# Patient Record
Sex: MALE | Race: WHITE | Employment: OTHER | URBAN - METROPOLITAN AREA
[De-identification: names, ages, dates, MRNs, and addresses within clinical notes are randomized per-mention and may not be internally consistent; named-entity substitution may affect disease eponyms.]

---

## 2018-06-06 ENCOUNTER — HOSPITAL ENCOUNTER (OUTPATIENT)
Age: 78
Discharge: HOME | End: 2018-06-06
Payer: MEDICARE

## 2018-06-06 DIAGNOSIS — E11.9 DIABETES MELLITUS WITHOUT COMPLICATION (HCC): ICD-10-CM

## 2018-06-06 DIAGNOSIS — E78.5 LIPIDS BLOOD INCREASED: ICD-10-CM

## 2018-06-06 DIAGNOSIS — E03.9 ACQUIRED HYPOTHYROIDISM: ICD-10-CM

## 2018-06-06 LAB
ALBUMIN SERPL-MCNC: 4 G/DL (ref 3.5–5.2)
ALP BLD-CCNC: 54 U/L (ref 40–129)
ALT SERPL-CCNC: 10 U/L (ref 0–40)
ANION GAP SERPL CALCULATED.3IONS-SCNC: 12 MMOL/L (ref 7–16)
AST SERPL-CCNC: 17 U/L (ref 0–39)
BILIRUB SERPL-MCNC: 0.3 MG/DL (ref 0–1.2)
BUN BLDV-MCNC: 23 MG/DL (ref 8–23)
CALCIUM SERPL-MCNC: 9.2 MG/DL (ref 8.6–10.2)
CHLORIDE BLD-SCNC: 97 MMOL/L (ref 98–107)
CHOLESTEROL, TOTAL: 158 MG/DL (ref 0–199)
CO2: 28 MMOL/L (ref 22–29)
CREAT SERPL-MCNC: 1.1 MG/DL (ref 0.7–1.2)
GFR AFRICAN AMERICAN: >60
GFR NON-AFRICAN AMERICAN: >60 ML/MIN/1.73
GLUCOSE BLD-MCNC: 107 MG/DL (ref 74–109)
HBA1C MFR BLD: 6 % (ref 4.8–5.9)
HDLC SERPL-MCNC: 45 MG/DL
LDL CHOLESTEROL CALCULATED: 77 MG/DL (ref 0–99)
POTASSIUM SERPL-SCNC: 4.6 MMOL/L (ref 3.5–5)
SODIUM BLD-SCNC: 137 MMOL/L (ref 132–146)
TOTAL PROTEIN: 6.7 G/DL (ref 6.4–8.3)
TRIGL SERPL-MCNC: 181 MG/DL (ref 0–149)
TSH SERPL DL<=0.05 MIU/L-ACNC: 2.83 UIU/ML (ref 0.27–4.2)
VLDLC SERPL CALC-MCNC: 36 MG/DL

## 2018-06-06 PROCEDURE — 84443 ASSAY THYROID STIM HORMONE: CPT

## 2018-06-06 PROCEDURE — 36415 COLL VENOUS BLD VENIPUNCTURE: CPT

## 2018-06-06 PROCEDURE — 80053 COMPREHEN METABOLIC PANEL: CPT

## 2018-06-06 PROCEDURE — 80061 LIPID PANEL: CPT

## 2018-06-06 PROCEDURE — 83036 HEMOGLOBIN GLYCOSYLATED A1C: CPT

## 2018-06-13 PROBLEM — C61 PROSTATE CANCER (HCC): Status: ACTIVE | Noted: 2018-06-13

## 2018-09-07 ENCOUNTER — HOSPITAL ENCOUNTER (OUTPATIENT)
Age: 78
Discharge: HOME | End: 2018-09-07
Payer: MEDICARE

## 2018-09-07 PROCEDURE — 36415 COLL VENOUS BLD VENIPUNCTURE: CPT

## 2018-09-07 PROCEDURE — G0103 PSA SCREENING: HCPCS

## 2018-09-07 PROCEDURE — 84153 ASSAY OF PSA TOTAL: CPT

## 2018-09-10 LAB
PROSTATE SPECIFIC ANTIGEN: 0.23 NG/ML (ref 0–4)
PROSTATE SPECIFIC ANTIGEN: ABNORMAL NG/ML (ref 0–4)

## 2018-12-04 ENCOUNTER — HOSPITAL ENCOUNTER (OUTPATIENT)
Age: 78
Discharge: HOME | End: 2018-12-04
Payer: MEDICARE

## 2018-12-04 DIAGNOSIS — E11.9 DIABETES MELLITUS WITHOUT COMPLICATION (HCC): ICD-10-CM

## 2018-12-04 DIAGNOSIS — E78.5 LIPIDS BLOOD INCREASED: ICD-10-CM

## 2018-12-04 DIAGNOSIS — E03.9 ACQUIRED HYPOTHYROIDISM: ICD-10-CM

## 2018-12-04 LAB
ALBUMIN SERPL-MCNC: 4.2 G/DL (ref 3.5–5.2)
ALP BLD-CCNC: 57 U/L (ref 40–129)
ALT SERPL-CCNC: 9 U/L (ref 0–40)
ANION GAP SERPL CALCULATED.3IONS-SCNC: 12 MMOL/L (ref 7–16)
AST SERPL-CCNC: 16 U/L (ref 0–39)
BILIRUB SERPL-MCNC: 0.4 MG/DL (ref 0–1.2)
BUN BLDV-MCNC: 26 MG/DL (ref 8–23)
CALCIUM SERPL-MCNC: 9.2 MG/DL (ref 8.6–10.2)
CHLORIDE BLD-SCNC: 96 MMOL/L (ref 98–107)
CHOLESTEROL, TOTAL: 166 MG/DL (ref 0–199)
CO2: 28 MMOL/L (ref 22–29)
CREAT SERPL-MCNC: 1.3 MG/DL (ref 0.7–1.2)
GFR AFRICAN AMERICAN: >60
GFR NON-AFRICAN AMERICAN: 53 ML/MIN/1.73
GLUCOSE BLD-MCNC: 107 MG/DL (ref 74–99)
HBA1C MFR BLD: 5.8 % (ref 4–5.6)
HDLC SERPL-MCNC: 50 MG/DL
LDL CHOLESTEROL CALCULATED: 84 MG/DL (ref 0–99)
POTASSIUM SERPL-SCNC: 4.6 MMOL/L (ref 3.5–5)
PROSTATE SPECIFIC ANTIGEN: 0.57 NG/ML (ref 0–4)
SODIUM BLD-SCNC: 136 MMOL/L (ref 132–146)
TOTAL PROTEIN: 7 G/DL (ref 6.4–8.3)
TRIGL SERPL-MCNC: 159 MG/DL (ref 0–149)
TSH SERPL DL<=0.05 MIU/L-ACNC: 3.55 UIU/ML (ref 0.27–4.2)
VLDLC SERPL CALC-MCNC: 32 MG/DL

## 2018-12-04 PROCEDURE — 36415 COLL VENOUS BLD VENIPUNCTURE: CPT

## 2018-12-04 PROCEDURE — 80061 LIPID PANEL: CPT

## 2018-12-04 PROCEDURE — 84443 ASSAY THYROID STIM HORMONE: CPT

## 2018-12-04 PROCEDURE — 84153 ASSAY OF PSA TOTAL: CPT

## 2018-12-04 PROCEDURE — 83036 HEMOGLOBIN GLYCOSYLATED A1C: CPT

## 2018-12-04 PROCEDURE — 80053 COMPREHEN METABOLIC PANEL: CPT

## 2019-06-05 ENCOUNTER — HOSPITAL ENCOUNTER (OUTPATIENT)
Age: 79
Discharge: HOME | End: 2019-06-05
Payer: MEDICARE

## 2019-06-05 DIAGNOSIS — E11.9 TYPE 2 DIABETES MELLITUS WITHOUT COMPLICATION, WITHOUT LONG-TERM CURRENT USE OF INSULIN (HCC): ICD-10-CM

## 2019-06-05 DIAGNOSIS — E03.9 ACQUIRED HYPOTHYROIDISM: ICD-10-CM

## 2019-06-05 DIAGNOSIS — E78.5 HYPERLIPIDEMIA, UNSPECIFIED HYPERLIPIDEMIA TYPE: ICD-10-CM

## 2019-06-05 LAB
ALBUMIN SERPL-MCNC: 4.3 G/DL (ref 3.5–5.2)
ALP BLD-CCNC: 58 U/L (ref 40–129)
ALT SERPL-CCNC: 7 U/L (ref 0–40)
ANION GAP SERPL CALCULATED.3IONS-SCNC: 10 MMOL/L (ref 7–16)
AST SERPL-CCNC: 16 U/L (ref 0–39)
BILIRUB SERPL-MCNC: 0.4 MG/DL (ref 0–1.2)
BUN BLDV-MCNC: 20 MG/DL (ref 8–23)
CALCIUM SERPL-MCNC: 9.1 MG/DL (ref 8.6–10.2)
CHLORIDE BLD-SCNC: 98 MMOL/L (ref 98–107)
CHOLESTEROL, TOTAL: 152 MG/DL (ref 0–199)
CO2: 29 MMOL/L (ref 22–29)
CREAT SERPL-MCNC: 1.3 MG/DL (ref 0.7–1.2)
GFR AFRICAN AMERICAN: >60
GFR NON-AFRICAN AMERICAN: 53 ML/MIN/1.73
GLUCOSE BLD-MCNC: 108 MG/DL (ref 74–99)
HBA1C MFR BLD: 6 % (ref 4–5.6)
HDLC SERPL-MCNC: 45 MG/DL
LDL CHOLESTEROL CALCULATED: 80 MG/DL (ref 0–99)
POTASSIUM SERPL-SCNC: 4.8 MMOL/L (ref 3.5–5)
SODIUM BLD-SCNC: 137 MMOL/L (ref 132–146)
TOTAL PROTEIN: 6.9 G/DL (ref 6.4–8.3)
TRIGL SERPL-MCNC: 136 MG/DL (ref 0–149)
TSH SERPL DL<=0.05 MIU/L-ACNC: 3.8 UIU/ML (ref 0.27–4.2)
VLDLC SERPL CALC-MCNC: 27 MG/DL

## 2019-06-05 PROCEDURE — 84443 ASSAY THYROID STIM HORMONE: CPT

## 2019-06-05 PROCEDURE — 36415 COLL VENOUS BLD VENIPUNCTURE: CPT

## 2019-06-05 PROCEDURE — 80061 LIPID PANEL: CPT

## 2019-06-05 PROCEDURE — 80053 COMPREHEN METABOLIC PANEL: CPT

## 2019-06-05 PROCEDURE — 83036 HEMOGLOBIN GLYCOSYLATED A1C: CPT

## 2019-06-05 PROCEDURE — G0103 PSA SCREENING: HCPCS

## 2019-06-05 PROCEDURE — 84153 ASSAY OF PSA TOTAL: CPT

## 2019-06-20 LAB
PROSTATE SPECIFIC ANTIGEN: 4.12 NG/ML (ref 0–4)
PROSTATE SPECIFIC ANTIGEN: ABNORMAL NG/ML (ref 0–4)

## 2019-08-06 ENCOUNTER — HOSPITAL ENCOUNTER (OUTPATIENT)
Age: 79
Discharge: HOME | End: 2019-08-08
Payer: MEDICARE

## 2019-08-06 LAB — PROSTATE SPECIFIC ANTIGEN: 4.06 NG/ML (ref 0–4)

## 2019-08-06 PROCEDURE — 84153 ASSAY OF PSA TOTAL: CPT

## 2019-09-27 ENCOUNTER — HOSPITAL ENCOUNTER (OUTPATIENT)
Age: 79
Discharge: HOME | End: 2019-09-29
Payer: MEDICARE

## 2019-09-27 PROCEDURE — 84403 ASSAY OF TOTAL TESTOSTERONE: CPT

## 2019-09-27 PROCEDURE — 84153 ASSAY OF PSA TOTAL: CPT

## 2019-09-28 LAB
PROSTATE SPECIFIC ANTIGEN: 0.52 NG/ML (ref 0–4)
TESTOSTERONE TOTAL: <2.5 NG/DL

## 2020-01-03 ENCOUNTER — HOSPITAL ENCOUNTER (OUTPATIENT)
Age: 80
Discharge: HOME | End: 2020-01-05
Payer: MEDICARE

## 2020-01-03 LAB — PROSTATE SPECIFIC ANTIGEN: 2.34 NG/ML (ref 0–4)

## 2020-01-03 PROCEDURE — 84153 ASSAY OF PSA TOTAL: CPT

## 2020-03-13 ENCOUNTER — HOSPITAL ENCOUNTER (OUTPATIENT)
Age: 80
Discharge: HOME | End: 2020-03-15
Payer: MEDICARE

## 2020-03-13 LAB
ALBUMIN SERPL-MCNC: 4 G/DL (ref 3.5–5.2)
ALP BLD-CCNC: 57 U/L (ref 40–129)
ALT SERPL-CCNC: 7 U/L (ref 0–40)
ANION GAP SERPL CALCULATED.3IONS-SCNC: 11 MMOL/L (ref 7–16)
AST SERPL-CCNC: 20 U/L (ref 0–39)
BILIRUB SERPL-MCNC: 0.3 MG/DL (ref 0–1.2)
BUN BLDV-MCNC: 26 MG/DL (ref 8–23)
CALCIUM SERPL-MCNC: 9.3 MG/DL (ref 8.6–10.2)
CHLORIDE BLD-SCNC: 100 MMOL/L (ref 98–107)
CHOLESTEROL, TOTAL: 151 MG/DL (ref 0–199)
CO2: 27 MMOL/L (ref 22–29)
CREAT SERPL-MCNC: 1.4 MG/DL (ref 0.7–1.2)
GFR AFRICAN AMERICAN: 59
GFR NON-AFRICAN AMERICAN: 49 ML/MIN/1.73
GLUCOSE BLD-MCNC: 114 MG/DL (ref 74–99)
HBA1C MFR BLD: 6.1 % (ref 4–5.6)
HDLC SERPL-MCNC: 43 MG/DL
LDL CHOLESTEROL CALCULATED: 85 MG/DL (ref 0–99)
POTASSIUM SERPL-SCNC: 6.1 MMOL/L (ref 3.5–5)
SODIUM BLD-SCNC: 138 MMOL/L (ref 132–146)
T4 FREE: 1.38 NG/DL (ref 0.93–1.7)
TOTAL PROTEIN: 6.9 G/DL (ref 6.4–8.3)
TRIGL SERPL-MCNC: 114 MG/DL (ref 0–149)
TSH SERPL DL<=0.05 MIU/L-ACNC: 3.75 UIU/ML (ref 0.27–4.2)
VLDLC SERPL CALC-MCNC: 23 MG/DL

## 2020-03-13 PROCEDURE — 80061 LIPID PANEL: CPT

## 2020-03-13 PROCEDURE — 80053 COMPREHEN METABOLIC PANEL: CPT

## 2020-03-13 PROCEDURE — 84439 ASSAY OF FREE THYROXINE: CPT

## 2020-03-13 PROCEDURE — 84443 ASSAY THYROID STIM HORMONE: CPT

## 2020-03-13 PROCEDURE — 83036 HEMOGLOBIN GLYCOSYLATED A1C: CPT

## 2020-03-14 LAB — T3 UPTAKE PERCENT: 35 % (ref 22.5–37)

## 2020-06-16 ENCOUNTER — HOSPITAL ENCOUNTER (OUTPATIENT)
Age: 80
Discharge: HOME | End: 2020-06-18
Payer: MEDICARE

## 2020-06-16 LAB
ALBUMIN SERPL-MCNC: 4.2 G/DL (ref 3.5–5.2)
ALP BLD-CCNC: 64 U/L (ref 40–129)
ALT SERPL-CCNC: 8 U/L (ref 0–40)
ANION GAP SERPL CALCULATED.3IONS-SCNC: 13 MMOL/L (ref 7–16)
AST SERPL-CCNC: 19 U/L (ref 0–39)
BILIRUB SERPL-MCNC: 0.4 MG/DL (ref 0–1.2)
BUN BLDV-MCNC: 29 MG/DL (ref 8–23)
CALCIUM SERPL-MCNC: 9.5 MG/DL (ref 8.6–10.2)
CHLORIDE BLD-SCNC: 98 MMOL/L (ref 98–107)
CHOLESTEROL, TOTAL: 156 MG/DL (ref 0–199)
CO2: 24 MMOL/L (ref 22–29)
CREAT SERPL-MCNC: 1.6 MG/DL (ref 0.7–1.2)
GFR AFRICAN AMERICAN: 50
GFR NON-AFRICAN AMERICAN: 42 ML/MIN/1.73
GLUCOSE BLD-MCNC: 100 MG/DL (ref 74–99)
HBA1C MFR BLD: 6.2 % (ref 4–5.6)
HDLC SERPL-MCNC: 43 MG/DL
LDL CHOLESTEROL CALCULATED: 87 MG/DL (ref 0–99)
POTASSIUM SERPL-SCNC: 5.6 MMOL/L (ref 3.5–5)
SODIUM BLD-SCNC: 135 MMOL/L (ref 132–146)
T3 UPTAKE PERCENT: 35.3 % (ref 22.5–37)
T4 FREE: 1.36 NG/DL (ref 0.93–1.7)
TOTAL PROTEIN: 7.4 G/DL (ref 6.4–8.3)
TRIGL SERPL-MCNC: 132 MG/DL (ref 0–149)
TSH SERPL DL<=0.05 MIU/L-ACNC: 3.18 UIU/ML (ref 0.27–4.2)
VLDLC SERPL CALC-MCNC: 26 MG/DL

## 2020-06-16 PROCEDURE — 80061 LIPID PANEL: CPT

## 2020-06-16 PROCEDURE — 84443 ASSAY THYROID STIM HORMONE: CPT

## 2020-06-16 PROCEDURE — 80053 COMPREHEN METABOLIC PANEL: CPT

## 2020-06-16 PROCEDURE — 84439 ASSAY OF FREE THYROXINE: CPT

## 2020-06-16 PROCEDURE — 83036 HEMOGLOBIN GLYCOSYLATED A1C: CPT

## 2020-06-30 ENCOUNTER — HOSPITAL ENCOUNTER (OUTPATIENT)
Age: 80
Discharge: HOME | End: 2020-07-02
Payer: MEDICARE

## 2020-06-30 LAB
ANION GAP SERPL CALCULATED.3IONS-SCNC: 16 MMOL/L (ref 7–16)
BUN BLDV-MCNC: 25 MG/DL (ref 8–23)
CALCIUM SERPL-MCNC: 9 MG/DL (ref 8.6–10.2)
CHLORIDE BLD-SCNC: 96 MMOL/L (ref 98–107)
CO2: 23 MMOL/L (ref 22–29)
CREAT SERPL-MCNC: 1.5 MG/DL (ref 0.7–1.2)
GFR AFRICAN AMERICAN: 54
GFR NON-AFRICAN AMERICAN: 45 ML/MIN/1.73
GLUCOSE BLD-MCNC: 123 MG/DL (ref 74–99)
POTASSIUM SERPL-SCNC: 5 MMOL/L (ref 3.5–5)
PROSTATE SPECIFIC ANTIGEN: 5.23 NG/ML (ref 0–4)
SODIUM BLD-SCNC: 135 MMOL/L (ref 132–146)

## 2020-06-30 PROCEDURE — 84153 ASSAY OF PSA TOTAL: CPT

## 2020-06-30 PROCEDURE — 80048 BASIC METABOLIC PNL TOTAL CA: CPT

## 2020-09-18 ENCOUNTER — HOSPITAL ENCOUNTER (OUTPATIENT)
Age: 80
Discharge: HOME | End: 2020-09-20
Payer: MEDICARE

## 2020-09-18 LAB
ALBUMIN SERPL-MCNC: 3.9 G/DL (ref 3.5–5.2)
ALP BLD-CCNC: 60 U/L (ref 40–129)
ALT SERPL-CCNC: 6 U/L (ref 0–40)
ANION GAP SERPL CALCULATED.3IONS-SCNC: 16 MMOL/L (ref 7–16)
AST SERPL-CCNC: 17 U/L (ref 0–39)
BILIRUB SERPL-MCNC: 0.3 MG/DL (ref 0–1.2)
BUN BLDV-MCNC: 25 MG/DL (ref 8–23)
CALCIUM SERPL-MCNC: 9.2 MG/DL (ref 8.6–10.2)
CHLORIDE BLD-SCNC: 91 MMOL/L (ref 98–107)
CHOLESTEROL, TOTAL: 162 MG/DL (ref 0–199)
CO2: 24 MMOL/L (ref 22–29)
CREAT SERPL-MCNC: 1.3 MG/DL (ref 0.7–1.2)
GFR AFRICAN AMERICAN: >60
GFR NON-AFRICAN AMERICAN: 53 ML/MIN/1.73
GLUCOSE BLD-MCNC: 117 MG/DL (ref 74–99)
HBA1C MFR BLD: 6.4 % (ref 4–5.6)
HDLC SERPL-MCNC: 41 MG/DL
LDL CHOLESTEROL CALCULATED: 84 MG/DL (ref 0–99)
POTASSIUM SERPL-SCNC: 4.7 MMOL/L (ref 3.5–5)
SODIUM BLD-SCNC: 131 MMOL/L (ref 132–146)
T3 UPTAKE PERCENT: 35 % (ref 22.5–37)
T4 FREE: 1.41 NG/DL (ref 0.93–1.7)
TOTAL PROTEIN: 6.9 G/DL (ref 6.4–8.3)
TRIGL SERPL-MCNC: 185 MG/DL (ref 0–149)
TSH SERPL DL<=0.05 MIU/L-ACNC: 2.26 UIU/ML (ref 0.27–4.2)
VITAMIN B-12: 752 PG/ML (ref 211–946)
VLDLC SERPL CALC-MCNC: 37 MG/DL

## 2020-09-18 PROCEDURE — 84439 ASSAY OF FREE THYROXINE: CPT

## 2020-09-18 PROCEDURE — 82607 VITAMIN B-12: CPT

## 2020-09-18 PROCEDURE — 83036 HEMOGLOBIN GLYCOSYLATED A1C: CPT

## 2020-09-18 PROCEDURE — 80061 LIPID PANEL: CPT

## 2020-09-18 PROCEDURE — 80053 COMPREHEN METABOLIC PANEL: CPT

## 2020-09-18 PROCEDURE — 84443 ASSAY THYROID STIM HORMONE: CPT

## 2020-10-06 ENCOUNTER — HOSPITAL ENCOUNTER (OUTPATIENT)
Age: 80
Discharge: HOME | End: 2020-10-08
Payer: MEDICARE

## 2020-10-06 LAB
ANION GAP SERPL CALCULATED.3IONS-SCNC: 14 MMOL/L (ref 7–16)
BUN BLDV-MCNC: 22 MG/DL (ref 8–23)
CALCIUM SERPL-MCNC: 9.1 MG/DL (ref 8.6–10.2)
CHLORIDE BLD-SCNC: 97 MMOL/L (ref 98–107)
CO2: 23 MMOL/L (ref 22–29)
CREAT SERPL-MCNC: 1.3 MG/DL (ref 0.7–1.2)
GFR AFRICAN AMERICAN: >60
GFR NON-AFRICAN AMERICAN: 53 ML/MIN/1.73
GLUCOSE BLD-MCNC: 119 MG/DL (ref 74–99)
POTASSIUM SERPL-SCNC: 5.3 MMOL/L (ref 3.5–5)
PROSTATE SPECIFIC ANTIGEN: 0.75 NG/ML (ref 0–4)
SODIUM BLD-SCNC: 134 MMOL/L (ref 132–146)

## 2020-10-06 PROCEDURE — 84153 ASSAY OF PSA TOTAL: CPT

## 2020-10-06 PROCEDURE — 80048 BASIC METABOLIC PNL TOTAL CA: CPT

## 2021-03-26 DIAGNOSIS — E03.9 ACQUIRED HYPOTHYROIDISM: ICD-10-CM

## 2021-03-26 DIAGNOSIS — Z12.5 SCREENING FOR MALIGNANT NEOPLASM OF PROSTATE: ICD-10-CM

## 2021-03-26 DIAGNOSIS — E11.9 TYPE 2 DIABETES MELLITUS WITHOUT COMPLICATION, WITHOUT LONG-TERM CURRENT USE OF INSULIN (HCC): ICD-10-CM

## 2021-03-26 LAB
ALBUMIN SERPL-MCNC: 4.1 G/DL (ref 3.5–5.2)
ALP BLD-CCNC: 67 U/L (ref 40–129)
ALT SERPL-CCNC: 5 U/L (ref 0–40)
ANION GAP SERPL CALCULATED.3IONS-SCNC: 11 MMOL/L (ref 7–16)
AST SERPL-CCNC: 15 U/L (ref 0–39)
BILIRUB SERPL-MCNC: 0.4 MG/DL (ref 0–1.2)
BUN BLDV-MCNC: 25 MG/DL (ref 8–23)
CALCIUM SERPL-MCNC: 9 MG/DL (ref 8.6–10.2)
CHLORIDE BLD-SCNC: 96 MMOL/L (ref 98–107)
CHOLESTEROL, TOTAL: 147 MG/DL (ref 0–199)
CO2: 25 MMOL/L (ref 22–29)
CREAT SERPL-MCNC: 1.4 MG/DL (ref 0.7–1.2)
GFR AFRICAN AMERICAN: 59
GFR NON-AFRICAN AMERICAN: 48 ML/MIN/1.73
GLUCOSE BLD-MCNC: 114 MG/DL (ref 74–99)
HBA1C MFR BLD: 6.3 % (ref 4–5.6)
HDLC SERPL-MCNC: 34 MG/DL
LDL CHOLESTEROL CALCULATED: 83 MG/DL (ref 0–99)
POTASSIUM SERPL-SCNC: 5.1 MMOL/L (ref 3.5–5)
PROSTATE SPECIFIC ANTIGEN: 2.72 NG/ML (ref 0–4)
SODIUM BLD-SCNC: 132 MMOL/L (ref 132–146)
T4 FREE: 1.5 NG/DL (ref 0.93–1.7)
TOTAL PROTEIN: 6.9 G/DL (ref 6.4–8.3)
TRIGL SERPL-MCNC: 152 MG/DL (ref 0–149)
TSH SERPL DL<=0.05 MIU/L-ACNC: 2.49 UIU/ML (ref 0.27–4.2)
VLDLC SERPL CALC-MCNC: 30 MG/DL

## 2021-07-02 DIAGNOSIS — Z12.5 SCREENING FOR PROSTATE CANCER: ICD-10-CM

## 2021-07-02 DIAGNOSIS — E11.9 TYPE 2 DIABETES MELLITUS WITHOUT COMPLICATION, WITHOUT LONG-TERM CURRENT USE OF INSULIN (HCC): ICD-10-CM

## 2021-07-02 DIAGNOSIS — E03.9 ACQUIRED HYPOTHYROIDISM: ICD-10-CM

## 2021-07-02 LAB
ALBUMIN SERPL-MCNC: 4 G/DL (ref 3.5–5.2)
ALP BLD-CCNC: 63 U/L (ref 40–129)
ALT SERPL-CCNC: <5 U/L (ref 0–40)
ANION GAP SERPL CALCULATED.3IONS-SCNC: 13 MMOL/L (ref 7–16)
AST SERPL-CCNC: 16 U/L (ref 0–39)
BILIRUB SERPL-MCNC: 0.3 MG/DL (ref 0–1.2)
BUN BLDV-MCNC: 27 MG/DL (ref 6–23)
CALCIUM SERPL-MCNC: 9 MG/DL (ref 8.6–10.2)
CHLORIDE BLD-SCNC: 97 MMOL/L (ref 98–107)
CHOLESTEROL, TOTAL: 148 MG/DL (ref 0–199)
CO2: 23 MMOL/L (ref 22–29)
CREAT SERPL-MCNC: 1.4 MG/DL (ref 0.7–1.2)
GFR AFRICAN AMERICAN: 59
GFR NON-AFRICAN AMERICAN: 48 ML/MIN/1.73
GLUCOSE BLD-MCNC: 109 MG/DL (ref 74–99)
HBA1C MFR BLD: 6.1 % (ref 4–5.6)
HDLC SERPL-MCNC: 32 MG/DL
LDL CHOLESTEROL CALCULATED: 89 MG/DL (ref 0–99)
POTASSIUM SERPL-SCNC: 5.4 MMOL/L (ref 3.5–5)
PROSTATE SPECIFIC ANTIGEN: 3.86 NG/ML (ref 0–4)
SODIUM BLD-SCNC: 133 MMOL/L (ref 132–146)
T4 FREE: 1.4 NG/DL (ref 0.93–1.7)
TOTAL PROTEIN: 7.1 G/DL (ref 6.4–8.3)
TRIGL SERPL-MCNC: 133 MG/DL (ref 0–149)
TSH SERPL DL<=0.05 MIU/L-ACNC: 2.2 UIU/ML (ref 0.27–4.2)
VLDLC SERPL CALC-MCNC: 27 MG/DL

## 2022-01-01 ENCOUNTER — CLINICAL DOCUMENTATION ONLY (OUTPATIENT)
Facility: CLINIC | Age: 82
End: 2022-01-01

## 2022-02-15 DIAGNOSIS — E53.8 VITAMIN B 12 DEFICIENCY: ICD-10-CM

## 2022-02-15 DIAGNOSIS — N18.31 STAGE 3A CHRONIC KIDNEY DISEASE (HCC): ICD-10-CM

## 2022-02-15 DIAGNOSIS — E11.9 TYPE 2 DIABETES MELLITUS WITHOUT COMPLICATION, WITHOUT LONG-TERM CURRENT USE OF INSULIN (HCC): ICD-10-CM

## 2022-02-15 DIAGNOSIS — Z12.5 SCREENING FOR MALIGNANT NEOPLASM OF PROSTATE: ICD-10-CM

## 2022-02-15 DIAGNOSIS — E03.9 ACQUIRED HYPOTHYROIDISM: ICD-10-CM

## 2022-02-15 PROBLEM — E11.22 TYPE 2 DIABETES MELLITUS WITH CHRONIC KIDNEY DISEASE (HCC): Status: ACTIVE | Noted: 2022-02-15

## 2022-02-15 LAB
ALBUMIN SERPL-MCNC: 4.1 G/DL (ref 3.5–5.2)
ALP BLD-CCNC: 69 U/L (ref 40–129)
ALT SERPL-CCNC: 6 U/L (ref 0–40)
ANION GAP SERPL CALCULATED.3IONS-SCNC: 17 MMOL/L (ref 7–16)
AST SERPL-CCNC: 14 U/L (ref 0–39)
BASOPHILS ABSOLUTE: 0.08 E9/L (ref 0–0.2)
BASOPHILS RELATIVE PERCENT: 0.7 % (ref 0–2)
BILIRUB SERPL-MCNC: 0.3 MG/DL (ref 0–1.2)
BUN BLDV-MCNC: 23 MG/DL (ref 6–23)
CALCIUM SERPL-MCNC: 9.1 MG/DL (ref 8.6–10.2)
CHLORIDE BLD-SCNC: 96 MMOL/L (ref 98–107)
CHOLESTEROL, TOTAL: 166 MG/DL (ref 0–199)
CO2: 23 MMOL/L (ref 22–29)
CREAT SERPL-MCNC: 1.4 MG/DL (ref 0.7–1.2)
EOSINOPHILS ABSOLUTE: 0.85 E9/L (ref 0.05–0.5)
EOSINOPHILS RELATIVE PERCENT: 7.3 % (ref 0–6)
GFR AFRICAN AMERICAN: 58
GFR NON-AFRICAN AMERICAN: 48 ML/MIN/1.73
GLUCOSE BLD-MCNC: 119 MG/DL (ref 74–99)
HBA1C MFR BLD: 6.2 % (ref 4–5.6)
HCT VFR BLD CALC: 31.8 % (ref 37–54)
HDLC SERPL-MCNC: 41 MG/DL
HEMOGLOBIN: 9.5 G/DL (ref 12.5–16.5)
IMMATURE GRANULOCYTES #: 0.04 E9/L
IMMATURE GRANULOCYTES %: 0.3 % (ref 0–5)
LDL CHOLESTEROL CALCULATED: 86 MG/DL (ref 0–99)
LYMPHOCYTES ABSOLUTE: 1.36 E9/L (ref 1.5–4)
LYMPHOCYTES RELATIVE PERCENT: 11.7 % (ref 20–42)
MCH RBC QN AUTO: 24.5 PG (ref 26–35)
MCHC RBC AUTO-ENTMCNC: 29.9 % (ref 32–34.5)
MCV RBC AUTO: 82 FL (ref 80–99.9)
MONOCYTES ABSOLUTE: 0.91 E9/L (ref 0.1–0.95)
MONOCYTES RELATIVE PERCENT: 7.9 % (ref 2–12)
NEUTROPHILS ABSOLUTE: 8.35 E9/L (ref 1.8–7.3)
NEUTROPHILS RELATIVE PERCENT: 72.1 % (ref 43–80)
PDW BLD-RTO: 18.9 FL (ref 11.5–15)
PLATELET # BLD: 369 E9/L (ref 130–450)
PMV BLD AUTO: 11.2 FL (ref 7–12)
POTASSIUM SERPL-SCNC: 4.9 MMOL/L (ref 3.5–5)
PROSTATE SPECIFIC ANTIGEN: 0.57 NG/ML (ref 0–4)
RBC # BLD: 3.88 E12/L (ref 3.8–5.8)
SODIUM BLD-SCNC: 136 MMOL/L (ref 132–146)
T4 FREE: 1.41 NG/DL (ref 0.93–1.7)
TOTAL PROTEIN: 7.2 G/DL (ref 6.4–8.3)
TRIGL SERPL-MCNC: 194 MG/DL (ref 0–149)
TSH SERPL DL<=0.05 MIU/L-ACNC: 2.28 UIU/ML (ref 0.27–4.2)
VITAMIN B-12: 536 PG/ML (ref 211–946)
VITAMIN D 25-HYDROXY: 39 NG/ML (ref 30–100)
VLDLC SERPL CALC-MCNC: 39 MG/DL
WBC # BLD: 11.6 E9/L (ref 4.5–11.5)

## 2022-02-16 PROBLEM — D64.9 ANEMIA: Status: ACTIVE | Noted: 2022-02-16

## 2022-02-22 DIAGNOSIS — D64.9 ANEMIA, UNSPECIFIED TYPE: ICD-10-CM

## 2022-02-22 LAB
ANISOCYTOSIS: ABNORMAL
BASOPHILS ABSOLUTE: 0.07 E9/L (ref 0–0.2)
BASOPHILS RELATIVE PERCENT: 0.6 % (ref 0–2)
EOSINOPHILS ABSOLUTE: 0.69 E9/L (ref 0.05–0.5)
EOSINOPHILS RELATIVE PERCENT: 5.9 % (ref 0–6)
FERRITIN: 21 NG/ML
HCT VFR BLD CALC: 32.6 % (ref 37–54)
HEMOGLOBIN: 9.4 G/DL (ref 12.5–16.5)
HYPOCHROMIA: ABNORMAL
IMMATURE GRANULOCYTES #: 0.04 E9/L
IMMATURE GRANULOCYTES %: 0.3 % (ref 0–5)
IRON SATURATION: 9 % (ref 20–55)
IRON: 33 MCG/DL (ref 59–158)
LYMPHOCYTES ABSOLUTE: 1.49 E9/L (ref 1.5–4)
LYMPHOCYTES RELATIVE PERCENT: 12.7 % (ref 20–42)
MCH RBC QN AUTO: 24.4 PG (ref 26–35)
MCHC RBC AUTO-ENTMCNC: 28.8 % (ref 32–34.5)
MCV RBC AUTO: 84.5 FL (ref 80–99.9)
MONOCYTES ABSOLUTE: 1.08 E9/L (ref 0.1–0.95)
MONOCYTES RELATIVE PERCENT: 9.2 % (ref 2–12)
NEUTROPHILS ABSOLUTE: 8.35 E9/L (ref 1.8–7.3)
NEUTROPHILS RELATIVE PERCENT: 71.3 % (ref 43–80)
OVALOCYTES: ABNORMAL
PDW BLD-RTO: 18.6 FL (ref 11.5–15)
PLATELET # BLD: 367 E9/L (ref 130–450)
PMV BLD AUTO: 10.8 FL (ref 7–12)
POIKILOCYTES: ABNORMAL
POLYCHROMASIA: ABNORMAL
RBC # BLD: 3.86 E12/L (ref 3.8–5.8)
SCHISTOCYTES: ABNORMAL
TEAR DROP CELLS: ABNORMAL
TOTAL IRON BINDING CAPACITY: 378 MCG/DL (ref 250–450)
WBC # BLD: 11.7 E9/L (ref 4.5–11.5)

## 2022-02-23 NOTE — RESULT ENCOUNTER NOTE
Still anemic-slightly worse than last week. Needs sent to general surgery for scopes to be worked up for GI bleed-Dr. Yair Benítez-use diagnosis anemia. Also is iron deficient-start ferrous sulfate 325mg PO daily as well.

## 2022-03-07 ENCOUNTER — TELEPHONE (OUTPATIENT)
Dept: SURGERY | Age: 82
End: 2022-03-07

## 2022-03-07 ENCOUNTER — OFFICE VISIT (OUTPATIENT)
Dept: SURGERY | Age: 82
End: 2022-03-07
Payer: MEDICARE

## 2022-03-07 VITALS
WEIGHT: 180 LBS | RESPIRATION RATE: 16 BRPM | DIASTOLIC BLOOD PRESSURE: 75 MMHG | BODY MASS INDEX: 26.66 KG/M2 | SYSTOLIC BLOOD PRESSURE: 178 MMHG | OXYGEN SATURATION: 97 % | HEART RATE: 93 BPM | HEIGHT: 69 IN | TEMPERATURE: 97.2 F

## 2022-03-07 DIAGNOSIS — D50.9 CHRONIC IRON DEFICIENCY ANEMIA: Primary | ICD-10-CM

## 2022-03-07 PROCEDURE — 4004F PT TOBACCO SCREEN RCVD TLK: CPT | Performed by: SURGERY

## 2022-03-07 PROCEDURE — 4040F PNEUMOC VAC/ADMIN/RCVD: CPT | Performed by: SURGERY

## 2022-03-07 PROCEDURE — G8427 DOCREV CUR MEDS BY ELIG CLIN: HCPCS | Performed by: SURGERY

## 2022-03-07 PROCEDURE — G8484 FLU IMMUNIZE NO ADMIN: HCPCS | Performed by: SURGERY

## 2022-03-07 PROCEDURE — 1123F ACP DISCUSS/DSCN MKR DOCD: CPT | Performed by: SURGERY

## 2022-03-07 PROCEDURE — G8417 CALC BMI ABV UP PARAM F/U: HCPCS | Performed by: SURGERY

## 2022-03-07 PROCEDURE — 99203 OFFICE O/P NEW LOW 30 MIN: CPT | Performed by: SURGERY

## 2022-03-07 NOTE — PATIENT INSTRUCTIONS
Kelly Rojas MD, FACS    Preoperative Instructions    Please read the following information very carefully. It contains information that is necessary to best prepare you for your upcoming procedure. Make arrangements for a  to take you to and from your procedure. YOU MUST HAVE SOMEONE DRIVE YOU HOME - this cannot be a taxi or public transportation. You will not be administered anesthesia without someone to go home and be at home with you that day. Nothing to eat or drink after midnight the night before your procedure. Follow your bowel prep instructions if you have them for this procedure. 3 days prior to your procedure: Stop taking blood thinners like Coumadin or Plavix or Xarelto. 5 days prior to your procedure: Stop taking Aspirin or Aspirin containing products. If you cannot stop any of these medications prior to your procedure, please contact our office. Medications morning of procedure: Only heart, breathing, blood pressure, and seizure medications are permitted on the morning of your procedure. These medications can be taken with a sip of water. IF YOU ARE UNABLE TO KEEP THE ABOVE SCHEDULED PROCEDURE, YOU MUST NOTIFY DR. FRANCISCO'S OFFICE [phone]. NOT THE FACILITY. NO CHEWING GUM OR CHEWING TOBACCO AFTER MIDNIGHT ON DAY OF PROCEDURE.    YOU MUST HAVE TRANSPORTATION TO AND FROM THE FACILITY. What is a colonoscopy? A colonoscopy is a test that lets a doctor look inside your colon. The doctor uses a thin, lighted tube called a colonoscope to look for problems. These include small growths called polyps, cancer, or bleeding. During the test, the doctor can take samples of tissue that can be checked for cancer or other problems. This is called a biopsy. The doctor can also take out polyps. Before the test, you will need to stop eating solid foods. You also will drink a liquid or take a tablet that cleans out your colon.  This helps your doctor be able to see inside your colon during the test.  Follow-up care is a key part of your treatment and safety. Be sure to make and go to all appointments, and call your doctor if you are having problems. It's also a good idea to know your test results and keep a list of the medicines you take. What happens before the procedure? Procedures can be stressful. This information will help you understand what you can expect. And it will help you safely prepare for your procedure. Preparing for the procedure  · Understand exactly what procedure is planned, along with the risks, benefits, and other options. · Tell your doctors ALL the medicines, vitamins, supplements, and herbal remedies you take. Some of these can increase the risk of bleeding or interact with anesthesia. · If you take blood thinners, such as warfarin (Coumadin), clopidogrel (Plavix), or aspirin, be sure to talk to your doctor. He or she will tell you if you should stop taking these medicines before your procedure. Make sure that you understand exactly what your doctor wants you to do. · Your doctor will tell you which medicines to take or stop before your procedure. You may need to stop taking certain medicines a week or more before the procedure. So talk to your doctor as soon as you can. · If you have an advance directive, let your doctor know. It may include a living will and a durable power of  for health care. Bring a copy to the hospital. If you don't have one, you may want to prepare one. It lets your doctor and loved ones know your health care wishes. Doctors advise that everyone prepare these papers before any type of surgery or procedure. Before the procedure  · Follow your doctor's directions about when to stop eating solid foods and drink only clear liquids. You can drink water, clear juices, clear broths, flavored ice pops, and gelatin (such as Jell-O). Do not eat or drink anything red or purple.  This includes grape juice and grape-flavored ice pops. It also includes fruit punch and cherry gelatin. · Drink the \"colon prep\" liquid as your doctor tells you. You will want to stay home, because the liquid will make you go to the bathroom a lot. Your stools will be loose and watery. It is very important to drink all of the liquid. If you have problems drinking it, call your doctor. Some doctors may have you take a tablet rather than drink a liquid. · Do not eat any solid foods after you drink the colon prep. · Stop drinking clear liquids 6 to 8 hours before the test.  What happens on the day of the procedure? · Follow the instructions exactly about when to stop eating and drinking. If you don't, your procedure may be canceled. If your doctor told you to take your medicines on the day of the procedure, take them with only a sip of water. · Take a bath or shower before you come in for your procedure. Do not apply lotions, perfumes, deodorants, or nail polish. · Take off all jewelry and piercings. And take out contact lenses, if you wear them. At the [address] [place] [address] or hospital  · Bring a picture ID. · You will be kept comfortable and safe by your anesthesia provider. The anesthesia may make you sleep. · You will lie on your back or your side with your knees drawn up toward your belly. The doctor will gently put a gloved finger into your anus. Then the doctor puts the scope in and moves it into your colon. The scope goes in easily because it is lubricated. · The doctor may also use small tools to take tissue samples for a biopsy or to remove polyps. This does not hurt. · The test usually takes 30 to 45 minutes. But it may take longer. It depends on what is found and what is done. Going home  · Be sure you have someone to drive you home. Anesthesia and pain medicine make it unsafe for you to drive. · You will be given more specific instructions about recovering from your procedure. When should you call your doctor?   · You have questions or concerns. · You don't understand how to prepare for your procedure. · You are having trouble with the bowel prep. · You become ill before the procedure (such as fever, flu, or a cold). · You need to reschedule or have changed your mind about having the procedure. Where can you learn more? Go to [email or web address] and sign in to your [employer] account. Enter C315 in the Xplenty box to learn more about Colonoscopy: Before Your Procedure.     If you do not have an account, please click on the Sign Up Now link. © 1335-2342 [employer]wise, Inc[employer]. Care instructions adapted under license by [facility] [place] ([facility]). This care instruction is for use with your licensed healthcare professional. If you have questions about a medical condition or this instruction, always ask your healthcare professional. [address] any warranty or liability for your use of this information.   Content Version: 14.0.043689; Current as of: November 20, 2015

## 2022-03-07 NOTE — TELEPHONE ENCOUNTER
Prior Authorization Form:      DEMOGRAPHICS:                     Patient Name:  Vinny Saldaña  Patient :  1940            Insurance:  Payor: MEDICARE / Plan: MEDICARE PART A AND B / Product Type: *No Product type* /   Insurance ID Number:    Payor/Plan Subscr  Sex Relation Sub. Ins. ID Effective Group Num   1. MEDICARE - ME* JOANNE YOUSSEF A 1940 Male Self [id number]                                    [address] [address]   2.  [address] [place] A 1940 Male Self [id number]                                    [address]         DIAGNOSIS & PROCEDURE:                       Procedure/Operation:    EGD COLONOSCOPY           CPT Code: 36786   05293     Diagnosis:  ANEMIA    ICD10 Code: D64.9    Location:  [id number]    Surgeon:  Cody Miller INFORMATION:                          Date: 3-    Time:  9:30             Anesthesia:  MAC/TIVA                                                       Status:  Outpatient        Special Comments:         Electronically signed by Corwin Sagastume MA on 3/7/2022 at 3:26 PM

## 2022-03-07 NOTE — PROGRESS NOTES
General Surgery History and Physical    Patient's Name/Date of Birth: Lesa Montague / 1940    Date: 3/7/2022    PCP: Debra Polanco MD    Referring Physician:   Rebeca Singh, *  [phone]    CHIEF COMPLAINT:    Chief Complaint   Patient presents with    New Patient     egd/colonoscopy (ref by Dr. Lena Queen)         HISTORY OF PRESENT ILLNESS:    Lesa Montague is an 78 y.o. male who presents for a colonoscopy. The patient has anemia with Hg around 9. No nausea, vomiting, diarrhea, constipation. No changes in stool caliber. No bloody or black stools. No abdominal pain. No unintentional weight loss. No family history of colon cancer. The patient has a known history of: iron deficiency anemia. 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 Andrea Blue. He had an EGD at that time as well. He has not had either in 10 years.     Past Medical History:   Past Medical History:   Diagnosis Date    Aortic valve sclerosis 7/4/2014    no  cardiology, no treatment, no s/s    Diabetes mellitus ([address]     borderline,was on insulin and metformin , last dose 7/4/2014,     DISH (diffuse idiopathic skeletal hyperostosis) 7/4/2014    [place] (hard of hearing)     will not use aides    HTN (hypertension) 7/4/2014    Hypertension     bp has been slightly above normal    Hypothyroid 7/4/2014    Lipids blood increased 7/4/2014    Obstructive jaundice 7/4/2014    Thyroid disease     Tobacco abuse 7/4/2014    Vertigo 1994    Vitamin B 12 deficiency 7/4/2014    Vitamin D deficiency         Past Surgical History:   Past Surgical History:   Procedure Laterality Date    CATARACT REMOVAL WITH IMPLANT Bilateral 1998    Dr. Eboni Stearns  2015    Dr. Jf Armas [facility]  07/18/2014    13 stones removed    Kelly Rose of cholesteatoma   Kelly Miguel of cholesteatoma  SINUS SURGERY  2002    repair of nasal septum        Allergies: Patient has no known allergies. Medications:   Current Outpatient Medications   Medication Sig Dispense Refill    tiZANidine (ZANAFLEX) 4 MG tablet Take 1 tablet by mouth every 8 hours as needed (AS NEEDED FOR PAIN) 90 tablet 1    HYDROcodone-acetaminophen (NORCO) 5-325 MG per tablet Take 1 tablet by mouth every 6 hours as needed for Pain for up to 30 days. 120 tablet 0    pantoprazole (PROTONIX) 40 MG tablet TAKE 1 TABLET BY MOUTH DAILY 90 tablet 1    ferrous sulfate (IRON 325) 325 (65 Fe) MG tablet Take 1 tablet by mouth daily (with breakfast) 30 tablet 3    amLODIPine (NORVASC) 2.5 MG tablet Take 1 tablet by mouth daily 90 tablet 1    diclofenac sodium (VOLTAREN) 1 % GEL Apply 4 g topically 4 times daily as needed for Pain 100 g 0    losartan (COZAAR) 100 MG tablet TAKE 1 TABLET BY MOUTH DAILY 90 tablet 1    levothyroxine (SYNTHROID) 88 MCG tablet TAKE 1 TABLET BY MOUTH EVERY DAY 90 tablet 1    Tuberculin-Allergy Syringes (B-D TB SYRINGE .5CC/27GX1/2\") 27G X 1/2\" 0.5 ML MISC 1 each by Does not apply route every 14 days 50 each 0    metFORMIN (GLUCOPHAGE) 500 MG tablet TAKE 1 TABLET BY MOUTH TWICE DAILY WITH FOOD 573 tablet 3    folic acid (FOLVITE) 1 MG tablet TAKE 1 TABLET BY MOUTH DAILY 90 tablet 3    cyanocobalamin 1000 MCG/ML injection INJECT 1 ML INTO THE MUSCLE EVERY 14 DAYS 10 mL 2    Leuprolide Acetate, 6 Month, (LUPRON DEPOT, 6-MONTH, IM) Inject into the muscle      Probiotic Product (PROBIOTIC DAILY PO) Take by mouth      loratadine (CLARITIN) 10 MG tablet Take 10 mg by mouth daily       Cholecalciferol (VITAMIN D3) 2000 UNITS CAPS Take 1 capsule by mouth daily. Last dose 7/16/2014      aspirin 81 MG tablet Take 81 mg by mouth daily. Last dose 7/15/2014      Multiple Vitamins-Minerals (THERAPEUTIC MULTIVITAMIN-MINERALS) tablet Take 1 tablet by mouth daily.  Last dose 7/16/2014       Current Facility-Administered Medications   Medication Dose Route Frequency Provider Last Rate Last Admin    methylPREDNISolone acetate (DEPO-MEDROL) injection 80 mg  80 mg IntraMUSCular Once Isela Rosas MD        lidocaine 1 % injection 1 mL  1 mL Other Once Isela Rosas MD             Social History:   Social History     Tobacco Use    Smoking status: Current Every Day Smoker     Packs/day: 1.00     Years: 65.00     Pack years: 65.00     Types: Cigarettes    Smokeless tobacco: Never Used   Substance Use Topics    Alcohol use: Never     Alcohol/week: 0.0 standard drinks        Family History:   Family History   Problem Relation Age of Onset    Thyroid Disease Mother     Other Mother         COPD, pulmonary embolism    Stroke Father     Other Sister         lung issues    Prostate Cancer Brother     Arthritis Sister     Arthritis Sister     Arthritis Sister        REVIEW OF SYSTEMS:    Constitutional: negative  Eyes: negative  Ears, nose, mouth, throat, and face: negative  Respiratory: negative  Cardiovascular: negative  Gastrointestinal: as in HPI  Genitourinary:negative  Integument/breast: negative  Hematologic/lymphatic: negative  Musculoskeletal:negative  Neurological: negative  Allergic/Immunologic: negative    PHYSICAL EXAM   BP (!) 178/75   Pulse 93   Temp 97.2 °F (36.2 °C)   Resp 16   Ht 5' 9\" (1.753 m)   Wt 180 lb (81.6 kg)   SpO2 97%   BMI 26.58 kg/m²     General appearance: alert, cooperative and in no acute distress. Eyes: Grossly normal   Lungs: normal work of breathing  Heart: regular rate  Abdomen:  soft, non-tender, non-distended  Skin: No skin abnormalities  Neurologic: Alert and oriented x 3. Grossly normal  Musculoskeletal: No edema. ASSESSMENT AND PLAN:     Janis Montes is an 78 y.o. male who presents for an EGD and  colonoscopy with anemia      I will set the patient up for an EGD and colonoscopy, possible biopsy, possible polypectomy.   I explained the risks including but not limited to bleeding, perforation leading to possible surgery, or infection. The benefits, alternatives, and potential complications associated with the above procedure to be performed and transfusions when applicable with the patient/responsible person prior to the procedure. I discussed the risk of bowel peroration, postoperative bleeding, post-polypectomy syndrome, as well as the possibility of needing emergency surgery or another colonoscopy. All of the patient's questions were answered. The patient understands and agrees to the procedure.              Physician Signature: Electronically signed by Sandip Goodson MD, General Surgery    Send copy of H&P to PCP, Yoel Pastor MD and referring physician, Alvaro Gray, *

## 2022-03-15 NOTE — PROGRESS NOTES

## 2022-03-15 NOTE — PROGRESS NOTES
[facility] PRE-ADMISSION TESTING INSTRUCTIONS    The Preadmission Testing patient is instructed accordingly using the following criteria (check applicable):    ARRIVAL INSTRUCTIONS:  [x] Parking the day of Surgery is located in the Main Entrance lot. Upon entering the door, make an immediate right to the surgery reception desk    [x] Bring photo ID and insurance card    [] Bring in a copy of Living will or Durable Power of  papers. [x] Please be sure to arrange for responsible adult to provide transportation to and from the hospital    [x] Please arrange for responsible adult to be with you for the 24 hour period post procedure due to having anesthesia      GENERAL INSTRUCTIONS:    [x] Nothing by mouth after midnight, including gum, candy, mints or water    [x] You may brush your teeth, but do not swallow any water    [x] Take medications as instructed with 1-2 oz of water    [x] Stop herbal supplements and vitamins 5 days prior to procedure    [x] Follow preop dosing of blood thinners per physician instructions    [] Take 1/2 dose of evening insulin, but no insulin after midnight    [x] No oral diabetic medications after midnight    [x] If diabetic and have low blood sugar or feel symptomatic, take 1-2oz apple juice only    [] Bring inhalers day of surgery    [] Bring C-PAP/ Bi-Pap day of surgery    [] Bring urine specimen day of surgery    [x] Shower or bath with soap, lather and rinse well, AM of Surgery, no lotion, powders or creams to surgical site    [] Follow bowel prep as instructed per surgeon    [x] No tobacco products within 24 hours of surgery     [x] No alcohol or illegal drug use within 24 hours of surgery.     [x] Jewelry, body piercing's, eyeglasses, contact lenses and dentures are not permitted into surgery (bring cases)      [x] Please do not wear any nail polish, make up or hair products on the day of surgery    [x] You can expect a call the business day prior to procedure to notify you if your arrival time changes    [x] If you receive a survey after surgery we would greatly appreciate your comments    [] Parent/guardian of a minor must accompany their child and remain on the premises  the entire time they are under our care     [] Pediatric patients may bring favorite toy, blanket or comfort item with them    [] A caregiver or family member must remain with the patient during their stay if they are mentally handicapped, have dementia, disoriented or unable to use a call light or would be a safety concern if left unattended    [x] Please notify surgeon if you develop any illness between now and time of surgery (cold, cough, sore throat, fever, nausea, vomiting) or any signs of infections  including skin, wounds, and dental.    [x]  The Outpatient Pharmacy is available to fill your prescription here on your day of surgery, ask your preop nurse for details    [] Other instructions    EDUCATIONAL MATERIALS PROVIDED:    [] PAT Preoperative Education Packet/Booklet     [] Medication List    [] Transfusion bracelet applied with instructions    [] Shower with soap, lather and rinse well, and use CHG wipes provided the evening before surgery as instructed    [] Incentive spirometer with instructions

## 2022-03-18 ENCOUNTER — ANESTHESIA EVENT (OUTPATIENT)
Dept: ENDOSCOPY | Age: 82
End: 2022-03-18
Payer: MEDICARE

## 2022-03-18 ENCOUNTER — ANESTHESIA (OUTPATIENT)
Dept: ENDOSCOPY | Age: 82
End: 2022-03-18
Payer: MEDICARE

## 2022-03-18 ENCOUNTER — HOSPITAL ENCOUNTER (OUTPATIENT)
Age: 82
Setting detail: OUTPATIENT SURGERY
Discharge: HOME | End: 2022-03-18
Attending: SURGERY | Admitting: SURGERY
Payer: MEDICARE

## 2022-03-18 VITALS
OXYGEN SATURATION: 96 % | RESPIRATION RATE: 19 BRPM | DIASTOLIC BLOOD PRESSURE: 67 MMHG | SYSTOLIC BLOOD PRESSURE: 154 MMHG

## 2022-03-18 VITALS
HEIGHT: 69 IN | DIASTOLIC BLOOD PRESSURE: 76 MMHG | SYSTOLIC BLOOD PRESSURE: 180 MMHG | OXYGEN SATURATION: 94 % | TEMPERATURE: 97 F | WEIGHT: 180 LBS | RESPIRATION RATE: 18 BRPM | HEART RATE: 66 BPM | BODY MASS INDEX: 26.66 KG/M2

## 2022-03-18 LAB — METER GLUCOSE: 119 MG/DL (ref 74–99)

## 2022-03-18 PROCEDURE — 2580000003 HC RX 258: Performed by: NURSE ANESTHETIST, CERTIFIED REGISTERED

## 2022-03-18 PROCEDURE — 6360000002 HC RX W HCPCS: Performed by: NURSE ANESTHETIST, CERTIFIED REGISTERED

## 2022-03-18 PROCEDURE — 3700000000 HC ANESTHESIA ATTENDED CARE: Performed by: SURGERY

## 2022-03-18 PROCEDURE — 3609012400 HC EGD TRANSORAL BIOPSY SINGLE/MULTIPLE: Performed by: SURGERY

## 2022-03-18 PROCEDURE — 7100000010 HC PHASE II RECOVERY - FIRST 15 MIN: Performed by: SURGERY

## 2022-03-18 PROCEDURE — 2709999900 HC NON-CHARGEABLE SUPPLY: Performed by: SURGERY

## 2022-03-18 PROCEDURE — 43239 EGD BIOPSY SINGLE/MULTIPLE: CPT | Performed by: SURGERY

## 2022-03-18 PROCEDURE — 3609027000 HC COLONOSCOPY: Performed by: SURGERY

## 2022-03-18 PROCEDURE — 3700000001 HC ADD 15 MINUTES (ANESTHESIA): Performed by: SURGERY

## 2022-03-18 PROCEDURE — 82962 GLUCOSE BLOOD TEST: CPT

## 2022-03-18 PROCEDURE — 45378 DIAGNOSTIC COLONOSCOPY: CPT | Performed by: SURGERY

## 2022-03-18 PROCEDURE — 7100000011 HC PHASE II RECOVERY - ADDTL 15 MIN: Performed by: SURGERY

## 2022-03-18 PROCEDURE — 88305 TISSUE EXAM BY PATHOLOGIST: CPT

## 2022-03-18 RX ORDER — PROPOFOL 10 MG/ML
INJECTION, EMULSION INTRAVENOUS PRN
Status: DISCONTINUED | OUTPATIENT
Start: 2022-03-18 | End: 2022-03-18 | Stop reason: SDUPTHER

## 2022-03-18 RX ORDER — SODIUM CHLORIDE 9 MG/ML
INJECTION, SOLUTION INTRAVENOUS CONTINUOUS PRN
Status: DISCONTINUED | OUTPATIENT
Start: 2022-03-18 | End: 2022-03-18 | Stop reason: SDUPTHER

## 2022-03-18 RX ADMIN — PROPOFOL 300 MG: 10 INJECTION, EMULSION INTRAVENOUS at 09:36

## 2022-03-18 RX ADMIN — SODIUM CHLORIDE: 9 INJECTION, SOLUTION INTRAVENOUS at 09:09

## 2022-03-18 ASSESSMENT — PAIN DESCRIPTION - LOCATION
LOCATION: ABDOMEN;THROAT
LOCATION: ABDOMEN;THROAT

## 2022-03-18 ASSESSMENT — PAIN SCALES - GENERAL
PAINLEVEL_OUTOF10: 0
PAINLEVEL_OUTOF10: 0

## 2022-03-18 ASSESSMENT — PAIN DESCRIPTION - PAIN TYPE
TYPE: ACUTE PAIN
TYPE: ACUTE PAIN

## 2022-03-18 ASSESSMENT — LIFESTYLE VARIABLES: SMOKING_STATUS: 1

## 2022-03-18 ASSESSMENT — PAIN - FUNCTIONAL ASSESSMENT: PAIN_FUNCTIONAL_ASSESSMENT: 0-10

## 2022-03-18 NOTE — ANESTHESIA PRE PROCEDURE
Department of Anesthesiology  Preprocedure Note       Name:  Gianni Marquez   Age:  78 y.o.  :  1940                                          MRN:  [id number]         Date:  3/18/2022      Surgeon: Irene Licona):  Aaron Guzman MD    Procedure: Procedure(s):  EGD ESOPHAGOGASTRODUODENOSCOPY  COLONOSCOPY DIAGNOSTIC    Medications prior to admission:   Prior to Admission medications    Medication Sig Start Date End Date Taking? Authorizing Provider   tiZANidine (ZANAFLEX) 4 MG tablet Take 1 tablet by mouth every 8 hours as needed (AS NEEDED FOR PAIN) 3/7/22  Yes Eleanora Apgar, MD   HYDROcodone-acetaminophen (NORCO) 5-325 MG per tablet Take 1 tablet by mouth every 6 hours as needed for Pain for up to 30 days.  3/7/22 4/6/22 Yes Eleanora Apgar, MD   pantoprazole (PROTONIX) 40 MG tablet TAKE 1 TABLET BY MOUTH DAILY 22  Yes Eleanora Apgar, MD   ferrous sulfate (IRON 325) 325 (65 Fe) MG tablet Take 1 tablet by mouth daily (with breakfast) 22  Yes Eleanora Apgar, MD   amLODIPine (NORVASC) 2.5 MG tablet Take 1 tablet by mouth daily 2/15/22  Yes EARL Crocker Ala, CNP   diclofenac sodium (VOLTAREN) 1 % GEL Apply 4 g topically 4 times daily as needed for Pain 2/15/22  Yes EARL Crocker Ala, CNP   losartan (COZAAR) 100 MG tablet TAKE 1 TABLET BY MOUTH DAILY 22  Yes Eleanora Apgar, MD   levothyroxine (SYNTHROID) 88 MCG tablet TAKE 1 TABLET BY MOUTH EVERY DAY 21  Yes Eleanora Apgar, MD   metFORMIN (GLUCOPHAGE) 500 MG tablet TAKE 1 TABLET BY MOUTH TWICE DAILY WITH FOOD 21  Yes Eleanora Apgar, MD   folic acid (FOLVITE) 1 MG tablet TAKE 1 TABLET BY MOUTH DAILY 21  Yes Eleanora Apgar, MD   cyanocobalamin 1000 MCG/ML injection INJECT 1 ML INTO THE MUSCLE EVERY 14 DAYS 21  Yes Eleanora Apgar, MD   Leuprolide Acetate, 6 Month, (LUPRON DEPOT, 6-MONTH, IM) Inject into the muscle   Yes Historical Provider, MD   Probiotic Product (PROBIOTIC DAILY PO) Take by mouth   Yes Historical Provider, MD   loratadine (CLARITIN) 10 MG tablet Take 10 mg by mouth daily    Yes Historical Provider, MD   Cholecalciferol (VITAMIN D3) 2000 UNITS CAPS Take 1 capsule by mouth daily. Last dose 7/16/2014   Yes Historical Provider, MD   aspirin 81 MG tablet Take 81 mg by mouth daily. Last dose 7/15/2014   Yes Historical Provider, MD   Multiple Vitamins-Minerals (THERAPEUTIC MULTIVITAMIN-MINERALS) tablet Take 1 tablet by mouth daily. Last dose 7/16/2014   Yes Historical Provider, MD   Tuberculin-Allergy Syringes (B-D TB SYRINGE .5CC/27GX1/2\") 27G X 1/2\" 0.5 ML MISC 1 each by Does not apply route every 14 days 11/9/21   Diogo Jin MD       Current medications:    No current facility-administered medications for this encounter. Allergies:     Allergies   Allergen Reactions    Penicillins     Sulfa Antibiotics        Problem List:    Patient Active Problem List   Diagnosis Code    Vitamin B 12 deficiency E53.8    DISH (diffuse idiopathic skeletal hyperostosis) M48.10    HTN (hypertension) I10    Lipids blood increased E78.5    Tobacco abuse Z72.0    Type 2 diabetes mellitus ([address] E11.9    Hypothyroid E03.9    Aortic valve sclerosis I35.8    [place] (hard of hearing) H91.90    Chronic pain syndrome G89.4    Prostate cancer ([address] C61    Stage 3a chronic kidney disease ([address] N18.31    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    Anemia D64.9       Past Medical History:        Diagnosis Date    Anemia     Aortic valve sclerosis 7/4/2014    no  cardiology, no treatment, no s/s    Cancer ([address] 10/2018    prostate, no active issues    Diabetes mellitus ([address]     borderline,was on insulin and metformin , last dose 7/4/2014,     DISH (diffuse idiopathic skeletal hyperostosis) 7/4/2014    [place] (hard of hearing)     will not use aides    HTN (hypertension) 7/4/2014    Hyperlipidemia     Hypertension     bp has been slightly above normal    Hypothyroid 7/4/2014    Thyroid disease     Tobacco abuse 7/4/2014    Vertigo 1994    Vitamin B 12 deficiency 7/4/2014    Vitamin D deficiency        Past Surgical History:        Procedure Laterality Date    CATARACT REMOVAL WITH IMPLANT Bilateral 1998    Dr. Channing Bolivar [facility]  2015    Dr. Nixon Prom    ERCP  07/18/2014    13 stones removed    Rojelio Wright of cholesteatoma   RojelioBijan Infante of cholesteatoma     SINUS SURGERY  2002    repair of nasal septum       Social History:    Social History     Tobacco Use    Smoking status: Current Every Day Smoker     Packs/day: 0.50     Years: 65.00     Pack years: 32.50     Types: Cigarettes    Smokeless tobacco: Never Used   Substance Use Topics    Alcohol use: Never     Alcohol/week: 0.0 standard drinks                                Ready to quit: Not Answered  Counseling given: Not Answered      Vital Signs (Current):   Vitals:    03/15/22 1420   Weight: 181 lb (82.1 kg)   Height: 5' 9\" (1.753 m)                                              BP Readings from Last 3 Encounters:   03/07/22 (!) 178/75   02/15/22 108/60   10/08/21 (!) 130/58       NPO Status:                                                                                 BMI:   Wt Readings from Last 3 Encounters:   03/15/22 181 lb (82.1 kg)   03/07/22 180 lb (81.6 kg)   02/15/22 176 lb (79.8 kg)     Body mass index is 26.73 kg/m².     CBC:   Lab Results   Component Value Date    WBC 11.7 02/22/2022    RBC 3.86 02/22/2022    HGB 9.4 02/22/2022    HCT 32.6 02/22/2022    MCV 84.5 02/22/2022    RDW 18.6 02/22/2022     02/22/2022       CMP:   Lab Results   Component Value Date     02/15/2022    K 4.9 02/15/2022    CL 96 02/15/2022    CO2 23 02/15/2022    BUN 23 02/15/2022    CREATININE 1.4 02/15/2022    GFRAA 58 02/15/2022    LABGLOM 48 02/15/2022    GLUCOSE 119 02/15/2022 GLUCOSE 121 09/07/2011    PROT 7.2 02/15/2022    CALCIUM 9.1 02/15/2022    BILITOT 0.3 02/15/2022    ALKPHOS 69 02/15/2022    AST 14 02/15/2022    ALT 6 02/15/2022       POC Tests: No results for input(s): POCGLU, POCNA, POCK, POCCL, POCBUN, POCHEMO, POCHCT in the last 72 hours. Coags:   Lab Results   Component Value Date    PROTIME 11.0 07/18/2014    INR 1.0 07/18/2014    APTT 38.0 07/18/2014       HCG (If Applicable): No results found for: PREGTESTUR, PREGSERUM, HCG, HCGQUANT     ABGs: No results found for: PHART, PO2ART, MQC5JDM, HVT6TXO, BEART, G7ZAKNAZ     Type & Screen (If Applicable):  No results found for: LABABO, LABRH    Drug/Infectious Status (If Applicable):  No results found for: HIV, HEPCAB    COVID-19 Screening (If Applicable): No results found for: COVID19        Anesthesia Evaluation  Patient summary reviewed no history of anesthetic complications:   Airway: Mallampati: II  TM distance: >3 FB   Neck ROM: full   Dental: normal exam         Pulmonary: breath sounds clear to auscultation  (+) current smoker                           Cardiovascular:    (+) hypertension:, valvular problems/murmurs (Aortic valve sclerosis):, hyperlipidemia        Rhythm: regular  Rate: normal           Beta Blocker:  Not on Beta Blocker         Neuro/Psych:   Negative Neuro/Psych ROS               ROS comment: [place] (hard of hearing) GI/Hepatic/Renal:   (+) GERD:, renal disease: CRI,           Endo/Other:    (+) DiabetesType II DM, , hypothyroidism, blood dyscrasia: anemia:., malignancy/cancer (Prostate cancer ([address]). Abdominal:             Vascular: negative vascular ROS. Other Findings:           Anesthesia Plan      MAC     ASA 3       Induction: intravenous. Anesthetic plan and risks discussed with patient and child/children. Plan discussed with CRNA. Chart reviewed . Patient assessed before induction. I agree with the above note.   EARL Rivera - CRNA        Jim Doe MD   3/18/2022

## 2022-03-18 NOTE — OP NOTE
Operative Note: EGD and Colonoscopy    Cora Bridges     DATE OF PROCEDURE: 3/18/2022  SURGEON: Dr. Laura Schroeder MD, M.D. PREOPERATIVE DIAGNOSES:   Anemia    POSTOPERATIVE DIAGNOSES:   Small hiatal hernia    Extensive pan-diverticulosis  Cecal AVM    OPERATION:    EGD esophagogastroduodenoscopy With duodenal biopsies                   Colonoscopy to the cecum    SPECIMENS:  ID Type Source Tests Collected by Time Destination   A : duodenal bx Tissue Duodenum SURGICAL PATHOLOGY Sangeeta Rice MD 3/18/2022 [id number]        BLOOD LOSS: Minimal    ANESTHESIA: LMAC    CONSENT AND INDICATIONS:  This is a 78y.o. year old male who is having the above. I have discussed with the patient and/or the patient representative the indication, alternatives, and the possible risks and/or complications of the planned procedure and the anesthesia methods. The patient and/or patient representative appear to understand and agree to proceed. OPERATIONS: The patient was placed on the table and sedated via LMAC. Bite block was placed. A lubricated scope was easily passed into the upper esophagus which looked normal. The distal esophagus looked abnormal: GERD. The gastroesophageal junction was at 40 cm. The scope was passed into the stomach and retroflexed. There was a small hiatal hernia. The scope was passed down toward the pylorus. The antral mucosa all looked normal. The scope was then passed through the pylorus into the duodenal bulb which looked normal, then around to the distal duodenum which looked normal and biopsies were taken, and the scope was then withdrawn. The patient was then placed in left lateral decubitus position. A rectal exam was done and no masses were felt. A lubricated scope was passed into the rectum which looked normal.  The scope was passed all the way around to the cecum. Extensive pan-diverticulosis was found. The bowel prep was moderately clear.   The TI and appendiceal orifice were identified. There was a moderate sized cecal AVM  The scope was then slowly withdrawn, each area was examined again on the way out. The scope was retroflexed in the rectum and it was normal. The patient tolerated the procedure well. PLAN: Follow up duodenal biopsies. Repeat colonoscopy as needed.     Physician Signature: Electronically signed by Dr. [place]ia Barclay copy of H&P to PCP, Radhames Waggoner MD and referring physician, Angel Berg, *

## 2022-03-18 NOTE — ANESTHESIA POSTPROCEDURE EVALUATION
Department of Anesthesiology  Postprocedure Note    Patient: Maribell Weaver  MRN: [id number]  [place]: 1940  Date of evaluation: 3/18/2022  Time:  10:01 AM     Procedure Summary     Date: 03/18/22 Room / Location: [place] [address] / [facility]    Anesthesia Start: 0930 Anesthesia Stop: 1000    Procedures:       EGD BIOPSY (N/A )      COLONOSCOPY DIAGNOSTIC (N/A ) Diagnosis: (ANEMIA)    Surgeons: Germaine Harrison MD Responsible Provider: Bryn Neri MD    Anesthesia Type: MAC ASA Status: 3          Anesthesia Type: MAC    Nick Phase I: Nick Score: 10    Nick Phase II:      Last vitals: Reviewed and per EMR flowsheets.        Anesthesia Post Evaluation    Patient location during evaluation: PACU  Patient participation: complete - patient participated  Level of consciousness: awake  Airway patency: patent  Nausea & Vomiting: no nausea and no vomiting  Complications: no  Cardiovascular status: hemodynamically stable  Respiratory status: acceptable  Hydration status: euvolemic

## 2022-03-18 NOTE — PROGRESS NOTES
PT RECEIVED IN PACU 2 FROM ENDO REPORT RECEIVED DR HAILEE POOLE HERE AND SPEAKING WITH PT AND FAMILY REGARDING POST INSTRUCTIONS AND WOUND CARE FOR COCCYX

## 2022-03-18 NOTE — H&P
Patient's office history and physical was reviewed. Patient examined. There has been no change in the patient's history and physical.      Physician Signature: Electronically signed by Dr. Kelli Pierre Surgery History and Physical    Patient's Name/Date of Birth: Sona Ochoa / 1940    Date: 3/7/2022    PCP: Juhi Eckert MD    Referring Physician:   January Heath, *  [phone]    CHIEF COMPLAINT:    No chief complaint on file. HISTORY OF PRESENT ILLNESS:    Sona Ochoa is an 78 y.o. male who presents for a colonoscopy. The patient has anemia with Hg around 9. No nausea, vomiting, diarrhea, constipation. No changes in stool caliber. No bloody or black stools. No abdominal pain. No unintentional weight loss. No family history of colon cancer. The patient has a known history of: iron deficiency anemia. 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 An Gallo. He had an EGD at that time as well. He has not had either in 10 years.     Past Medical History:   Past Medical History:   Diagnosis Date    Anemia     Aortic valve sclerosis 7/4/2014    no  cardiology, no treatment, no s/s    Cancer [address] 10/2018    prostate, no active issues    Diabetes mellitus ([address]     borderline,was on insulin and metformin , last dose 7/4/2014,     DISH (diffuse idiopathic skeletal hyperostosis) 7/4/2014    [place] (hard of hearing)     will not use aides    HTN (hypertension) 7/4/2014    Hyperlipidemia     Hypertension     bp has been slightly above normal    Hypothyroid 7/4/2014    Thyroid disease     Tobacco abuse 7/4/2014    Vertigo 1994    Vitamin B 12 deficiency 7/4/2014    Vitamin D deficiency         Past Surgical History:   Past Surgical History:   Procedure Laterality Date    CATARACT REMOVAL WITH IMPLANT Bilateral 1998    Dr. Denice Mcdaniel  2011    Dr. Bev Shaver  2015    Dr. Vy Johns ERCP 07/18/2014    13 stones removed    [place] Holter Dr. Mark Allegra of cholesteatoma   [place]ug[place] Holter Dr. Guy Bon of cholesteatoma     SINUS SURGERY  2002    repair of nasal septum        Allergies: Penicillins and Sulfa antibiotics     Medications:   No current facility-administered medications for this encounter. Social History:   Social History     Tobacco Use    Smoking status: Current Every Day Smoker     Packs/day: 0.50     Years: 65.00     Pack years: 32.50     Types: Cigarettes    Smokeless tobacco: Never Used   Substance Use Topics    Alcohol use: Never     Alcohol/week: 0.0 standard drinks        Family History:   Family History   Problem Relation Age of Onset    Thyroid Disease Mother     Other Mother         COPD, pulmonary embolism    Stroke Father     Other Sister         lung issues    Prostate Cancer Brother     Arthritis Sister     Arthritis Sister     Arthritis Sister        REVIEW OF SYSTEMS:    Constitutional: negative  Eyes: negative  Ears, nose, mouth, throat, and face: negative  Respiratory: negative  Cardiovascular: negative  Gastrointestinal: as in HPI  Genitourinary:negative  Integument/breast: negative  Hematologic/lymphatic: negative  Musculoskeletal:negative  Neurological: negative  Allergic/Immunologic: negative    PHYSICAL EXAM   BP (!) 211/86   Pulse 70   Resp 20   Ht 5' 9\" (1.753 m)   Wt 180 lb (81.6 kg)   SpO2 96%   BMI 26.58 kg/m²     General appearance: alert, cooperative and in no acute distress. Eyes: Grossly normal   Lungs: normal work of breathing  Heart: regular rate  Abdomen:  soft, non-tender, non-distended  Skin: No skin abnormalities  Neurologic: Alert and oriented x 3. Grossly normal  Musculoskeletal: No edema.       ASSESSMENT AND PLAN:     Patrice Grajeda is an 78 y.o. male who presents for an EGD and  colonoscopy with anemia      I will set the patient up for an EGD and colonoscopy, possible biopsy, possible polypectomy. I explained the risks including but not limited to bleeding, perforation leading to possible surgery, or infection. The benefits, alternatives, and potential complications associated with the above procedure to be performed and transfusions when applicable with the patient/responsible person prior to the procedure. I discussed the risk of bowel peroration, postoperative bleeding, post-polypectomy syndrome, as well as the possibility of needing emergency surgery or another colonoscopy. All of the patient's questions were answered. The patient understands and agrees to the procedure.              Physician Signature: Electronically signed by Juan Rodriguez MD, General Surgery    Send copy of H&P to PCP, Hope Willson MD and referring physician, Evan Norman, *

## 2022-05-18 DIAGNOSIS — N18.31 TYPE 2 DIABETES MELLITUS WITH STAGE 3A CHRONIC KIDNEY DISEASE, WITHOUT LONG-TERM CURRENT USE OF INSULIN (HCC): ICD-10-CM

## 2022-05-18 DIAGNOSIS — E11.22 TYPE 2 DIABETES MELLITUS WITH STAGE 3A CHRONIC KIDNEY DISEASE, WITHOUT LONG-TERM CURRENT USE OF INSULIN (HCC): ICD-10-CM

## 2022-05-18 DIAGNOSIS — C61 MALIGNANT NEOPLASM OF PROSTATE (HCC): ICD-10-CM

## 2022-05-18 DIAGNOSIS — E53.8 VITAMIN B 12 DEFICIENCY: ICD-10-CM

## 2022-05-18 DIAGNOSIS — N18.31 STAGE 3A CHRONIC KIDNEY DISEASE (HCC): ICD-10-CM

## 2022-05-18 PROBLEM — N18.30 CHRONIC RENAL DISEASE, STAGE III (HCC): Status: ACTIVE | Noted: 2022-05-18

## 2022-05-18 LAB
ALBUMIN SERPL-MCNC: 4 G/DL (ref 3.5–5.2)
ALP BLD-CCNC: 70 U/L (ref 40–129)
ALT SERPL-CCNC: 7 U/L (ref 0–40)
ANION GAP SERPL CALCULATED.3IONS-SCNC: 11 MMOL/L (ref 7–16)
AST SERPL-CCNC: 17 U/L (ref 0–39)
BASOPHILS ABSOLUTE: 0.07 E9/L (ref 0–0.2)
BASOPHILS RELATIVE PERCENT: 0.6 % (ref 0–2)
BILIRUB SERPL-MCNC: 0.3 MG/DL (ref 0–1.2)
BUN BLDV-MCNC: 22 MG/DL (ref 6–23)
CALCIUM SERPL-MCNC: 9.1 MG/DL (ref 8.6–10.2)
CHLORIDE BLD-SCNC: 97 MMOL/L (ref 98–107)
CHOLESTEROL, TOTAL: 175 MG/DL (ref 0–199)
CO2: 26 MMOL/L (ref 22–29)
CREAT SERPL-MCNC: 1.4 MG/DL (ref 0.7–1.2)
EOSINOPHILS ABSOLUTE: 0.5 E9/L (ref 0.05–0.5)
EOSINOPHILS RELATIVE PERCENT: 4 % (ref 0–6)
GFR AFRICAN AMERICAN: 58
GFR NON-AFRICAN AMERICAN: 48 ML/MIN/1.73
GLUCOSE BLD-MCNC: 104 MG/DL (ref 74–99)
HBA1C MFR BLD: 6.2 % (ref 4–5.6)
HCT VFR BLD CALC: 43.7 % (ref 37–54)
HDLC SERPL-MCNC: 43 MG/DL
HEMOGLOBIN: 12.7 G/DL (ref 12.5–16.5)
IMMATURE GRANULOCYTES #: 0.03 E9/L
IMMATURE GRANULOCYTES %: 0.2 % (ref 0–5)
LDL CHOLESTEROL CALCULATED: 90 MG/DL (ref 0–99)
LYMPHOCYTES ABSOLUTE: 1.54 E9/L (ref 1.5–4)
LYMPHOCYTES RELATIVE PERCENT: 12.3 % (ref 20–42)
MCH RBC QN AUTO: 27.4 PG (ref 26–35)
MCHC RBC AUTO-ENTMCNC: 29.1 % (ref 32–34.5)
MCV RBC AUTO: 94.2 FL (ref 80–99.9)
MONOCYTES ABSOLUTE: 1.01 E9/L (ref 0.1–0.95)
MONOCYTES RELATIVE PERCENT: 8.1 % (ref 2–12)
NEUTROPHILS ABSOLUTE: 9.39 E9/L (ref 1.8–7.3)
NEUTROPHILS RELATIVE PERCENT: 74.8 % (ref 43–80)
PDW BLD-RTO: 18.9 FL (ref 11.5–15)
PLATELET # BLD: 262 E9/L (ref 130–450)
PMV BLD AUTO: 12.2 FL (ref 7–12)
POTASSIUM SERPL-SCNC: 5.2 MMOL/L (ref 3.5–5)
PROSTATE SPECIFIC ANTIGEN: 0.36 NG/ML (ref 0–4)
RBC # BLD: 4.64 E12/L (ref 3.8–5.8)
SODIUM BLD-SCNC: 134 MMOL/L (ref 132–146)
TOTAL PROTEIN: 7.1 G/DL (ref 6.4–8.3)
TRIGL SERPL-MCNC: 212 MG/DL (ref 0–149)
VITAMIN B-12: 428 PG/ML (ref 211–946)
VITAMIN D 25-HYDROXY: 38 NG/ML (ref 30–100)
VLDLC SERPL CALC-MCNC: 42 MG/DL
WBC # BLD: 12.5 E9/L (ref 4.5–11.5)

## 2022-09-13 DIAGNOSIS — C61 MALIGNANT NEOPLASM OF PROSTATE (HCC): ICD-10-CM

## 2022-09-13 DIAGNOSIS — E53.8 VITAMIN B 12 DEFICIENCY: ICD-10-CM

## 2022-09-13 DIAGNOSIS — D64.9 ANEMIA, UNSPECIFIED TYPE: ICD-10-CM

## 2022-09-13 DIAGNOSIS — E03.9 ACQUIRED HYPOTHYROIDISM: ICD-10-CM

## 2022-09-13 DIAGNOSIS — E11.9 TYPE 2 DIABETES MELLITUS WITHOUT COMPLICATION, WITHOUT LONG-TERM CURRENT USE OF INSULIN (HCC): ICD-10-CM

## 2022-09-13 LAB
ALBUMIN SERPL-MCNC: 3.9 G/DL (ref 3.5–5.2)
ALP BLD-CCNC: 71 U/L (ref 40–129)
ALT SERPL-CCNC: 6 U/L (ref 0–40)
ANION GAP SERPL CALCULATED.3IONS-SCNC: 12 MMOL/L (ref 7–16)
AST SERPL-CCNC: 14 U/L (ref 0–39)
BASOPHILS ABSOLUTE: 0.07 E9/L (ref 0–0.2)
BASOPHILS RELATIVE PERCENT: 0.6 % (ref 0–2)
BILIRUB SERPL-MCNC: 0.4 MG/DL (ref 0–1.2)
BUN BLDV-MCNC: 28 MG/DL (ref 6–23)
CALCIUM SERPL-MCNC: 9.4 MG/DL (ref 8.6–10.2)
CHLORIDE BLD-SCNC: 95 MMOL/L (ref 98–107)
CHOLESTEROL, TOTAL: 178 MG/DL (ref 0–199)
CO2: 27 MMOL/L (ref 22–29)
CREAT SERPL-MCNC: 1.6 MG/DL (ref 0.7–1.2)
EOSINOPHILS ABSOLUTE: 0.53 E9/L (ref 0.05–0.5)
EOSINOPHILS RELATIVE PERCENT: 4.3 % (ref 0–6)
GFR AFRICAN AMERICAN: 50
GFR NON-AFRICAN AMERICAN: 41 ML/MIN/1.73
GLUCOSE BLD-MCNC: 132 MG/DL (ref 74–99)
HBA1C MFR BLD: 6.2 % (ref 4–5.6)
HCT VFR BLD CALC: 40.3 % (ref 37–54)
HDLC SERPL-MCNC: 40 MG/DL
HEMOGLOBIN: 12.8 G/DL (ref 12.5–16.5)
IMMATURE GRANULOCYTES #: 0.06 E9/L
IMMATURE GRANULOCYTES %: 0.5 % (ref 0–5)
LDL CHOLESTEROL CALCULATED: 98 MG/DL (ref 0–99)
LYMPHOCYTES ABSOLUTE: 1.36 E9/L (ref 1.5–4)
LYMPHOCYTES RELATIVE PERCENT: 11 % (ref 20–42)
MCH RBC QN AUTO: 31.1 PG (ref 26–35)
MCHC RBC AUTO-ENTMCNC: 31.8 % (ref 32–34.5)
MCV RBC AUTO: 98.1 FL (ref 80–99.9)
MONOCYTES ABSOLUTE: 0.85 E9/L (ref 0.1–0.95)
MONOCYTES RELATIVE PERCENT: 6.9 % (ref 2–12)
NEUTROPHILS ABSOLUTE: 9.44 E9/L (ref 1.8–7.3)
NEUTROPHILS RELATIVE PERCENT: 76.7 % (ref 43–80)
PDW BLD-RTO: 14.2 FL (ref 11.5–15)
PLATELET # BLD: 255 E9/L (ref 130–450)
PMV BLD AUTO: 11.6 FL (ref 7–12)
POTASSIUM SERPL-SCNC: 5 MMOL/L (ref 3.5–5)
RBC # BLD: 4.11 E12/L (ref 3.8–5.8)
SODIUM BLD-SCNC: 134 MMOL/L (ref 132–146)
T4 FREE: 1.56 NG/DL (ref 0.93–1.7)
TOTAL PROTEIN: 6.9 G/DL (ref 6.4–8.3)
TRIGL SERPL-MCNC: 199 MG/DL (ref 0–149)
TSH SERPL DL<=0.05 MIU/L-ACNC: 2.79 UIU/ML (ref 0.27–4.2)
VITAMIN B-12: >2000 PG/ML (ref 211–946)
VLDLC SERPL CALC-MCNC: 40 MG/DL
WBC # BLD: 12.3 E9/L (ref 4.5–11.5)

## 2022-09-16 LAB
PROSTATE SPECIFIC ANTIGEN FREE: <0.1 NG/ML
PROSTATE SPECIFIC ANTIGEN PERCENT FREE: <33 %
PROSTATE SPECIFIC ANTIGEN: 0.3 NG/ML (ref 0–4)

## 2022-11-22 ENCOUNTER — HOSPITAL ENCOUNTER (INPATIENT)
Age: 82
LOS: 6 days | Discharge: NURSING, REHAB OR LONG-TERM CARE | DRG: 282 | End: 2022-11-28
Attending: EMERGENCY MEDICINE | Admitting: INTERNAL MEDICINE
Payer: MEDICARE

## 2022-11-22 ENCOUNTER — APPOINTMENT (OUTPATIENT)
Dept: GENERAL RADIOLOGY | Age: 82
DRG: 282 | End: 2022-11-22
Payer: MEDICARE

## 2022-11-22 ENCOUNTER — APPOINTMENT (OUTPATIENT)
Dept: CT IMAGING | Age: 82
DRG: 282 | End: 2022-11-22
Payer: MEDICARE

## 2022-11-22 DIAGNOSIS — I21.4 NSTEMI (NON-ST ELEVATED MYOCARDIAL INFARCTION) (HCC): Primary | ICD-10-CM

## 2022-11-22 PROBLEM — J44.9 CHRONIC OBSTRUCTIVE PULMONARY DISEASE (HCC): Status: ACTIVE | Noted: 2022-11-22

## 2022-11-22 PROBLEM — E78.00 PURE HYPERCHOLESTEROLEMIA: Status: ACTIVE | Noted: 2022-11-22

## 2022-11-22 PROBLEM — I25.10 CORONARY ARTERY DISEASE INVOLVING NATIVE CORONARY ARTERY OF NATIVE HEART WITHOUT ANGINA PECTORIS: Status: ACTIVE | Noted: 2022-11-22

## 2022-11-22 LAB
ALBUMIN SERPL-MCNC: 3.7 G/DL (ref 3.5–5.2)
ALP BLD-CCNC: 83 U/L (ref 40–129)
ALT SERPL-CCNC: 8 U/L (ref 0–40)
ANION GAP SERPL CALCULATED.3IONS-SCNC: 11 MMOL/L (ref 7–16)
APTT: 187.3 SEC (ref 24.5–35.1)
APTT: 29.7 SEC (ref 24.5–35.1)
AST SERPL-CCNC: 20 U/L (ref 0–39)
BACTERIA: ABNORMAL /HPF
BASOPHILS ABSOLUTE: 0.04 E9/L (ref 0–0.2)
BASOPHILS RELATIVE PERCENT: 0.3 % (ref 0–2)
BILIRUB SERPL-MCNC: 0.3 MG/DL (ref 0–1.2)
BILIRUBIN URINE: NEGATIVE
BLOOD, URINE: ABNORMAL
BUN BLDV-MCNC: 21 MG/DL (ref 6–23)
CALCIUM SERPL-MCNC: 9 MG/DL (ref 8.6–10.2)
CHLORIDE BLD-SCNC: 97 MMOL/L (ref 98–107)
CLARITY: CLEAR
CO2: 25 MMOL/L (ref 22–29)
COLOR: YELLOW
CREAT SERPL-MCNC: 1.2 MG/DL (ref 0.7–1.2)
EKG ATRIAL RATE: 77 BPM
EKG P AXIS: 7 DEGREES
EKG P-R INTERVAL: 120 MS
EKG Q-T INTERVAL: 394 MS
EKG QRS DURATION: 86 MS
EKG QTC CALCULATION (BAZETT): 445 MS
EKG R AXIS: 6 DEGREES
EKG T AXIS: -36 DEGREES
EKG VENTRICULAR RATE: 77 BPM
EOSINOPHILS ABSOLUTE: 0.18 E9/L (ref 0.05–0.5)
EOSINOPHILS RELATIVE PERCENT: 1.4 % (ref 0–6)
GFR SERPL CREATININE-BSD FRML MDRD: 59 ML/MIN/1.73
GLUCOSE BLD-MCNC: 152 MG/DL (ref 74–99)
GLUCOSE URINE: NEGATIVE MG/DL
HCT VFR BLD CALC: 38.1 % (ref 37–54)
HEMOGLOBIN: 12.2 G/DL (ref 12.5–16.5)
IMMATURE GRANULOCYTES #: 0.05 E9/L
IMMATURE GRANULOCYTES %: 0.4 % (ref 0–5)
INFLUENZA A BY PCR: NOT DETECTED
INFLUENZA B BY PCR: NOT DETECTED
KETONES, URINE: NEGATIVE MG/DL
LACTIC ACID: 2.8 MMOL/L (ref 0.5–2.2)
LEUKOCYTE ESTERASE, URINE: NEGATIVE
LYMPHOCYTES ABSOLUTE: 1.18 E9/L (ref 1.5–4)
LYMPHOCYTES RELATIVE PERCENT: 8.9 % (ref 20–42)
MCH RBC QN AUTO: 30.9 PG (ref 26–35)
MCHC RBC AUTO-ENTMCNC: 32 % (ref 32–34.5)
MCV RBC AUTO: 96.5 FL (ref 80–99.9)
MONOCYTES ABSOLUTE: 0.96 E9/L (ref 0.1–0.95)
MONOCYTES RELATIVE PERCENT: 7.3 % (ref 2–12)
NEUTROPHILS ABSOLUTE: 10.78 E9/L (ref 1.8–7.3)
NEUTROPHILS RELATIVE PERCENT: 81.7 % (ref 43–80)
NITRITE, URINE: NEGATIVE
PDW BLD-RTO: 13.4 FL (ref 11.5–15)
PH UA: 6 (ref 5–9)
PLATELET # BLD: 288 E9/L (ref 130–450)
PMV BLD AUTO: 10.8 FL (ref 7–12)
POTASSIUM REFLEX MAGNESIUM: 5 MMOL/L (ref 3.5–5)
PROTEIN UA: 100 MG/DL
RBC # BLD: 3.95 E12/L (ref 3.8–5.8)
RBC UA: ABNORMAL /HPF (ref 0–2)
SARS-COV-2, NAAT: NOT DETECTED
SODIUM BLD-SCNC: 133 MMOL/L (ref 132–146)
SPECIFIC GRAVITY UA: >=1.03 (ref 1–1.03)
TOTAL PROTEIN: 6.3 G/DL (ref 6.4–8.3)
TROPONIN, HIGH SENSITIVITY: 749 NG/L (ref 0–11)
TROPONIN, HIGH SENSITIVITY: 754 NG/L (ref 0–11)
TROPONIN, HIGH SENSITIVITY: 927 NG/L (ref 0–11)
TSH SERPL DL<=0.05 MIU/L-ACNC: 2.34 UIU/ML (ref 0.27–4.2)
UROBILINOGEN, URINE: 0.2 E.U./DL
WBC # BLD: 13.2 E9/L (ref 4.5–11.5)
WBC UA: ABNORMAL /HPF (ref 0–5)

## 2022-11-22 PROCEDURE — 87502 INFLUENZA DNA AMP PROBE: CPT

## 2022-11-22 PROCEDURE — APPSS30 APP SPLIT SHARED TIME 16-30 MINUTES: Performed by: CLINICAL NURSE SPECIALIST

## 2022-11-22 PROCEDURE — 93010 ELECTROCARDIOGRAM REPORT: CPT | Performed by: INTERNAL MEDICINE

## 2022-11-22 PROCEDURE — 36415 COLL VENOUS BLD VENIPUNCTURE: CPT

## 2022-11-22 PROCEDURE — 6360000002 HC RX W HCPCS: Performed by: STUDENT IN AN ORGANIZED HEALTH CARE EDUCATION/TRAINING PROGRAM

## 2022-11-22 PROCEDURE — 6370000000 HC RX 637 (ALT 250 FOR IP): Performed by: INTERNAL MEDICINE

## 2022-11-22 PROCEDURE — 80053 COMPREHEN METABOLIC PANEL: CPT

## 2022-11-22 PROCEDURE — 6370000000 HC RX 637 (ALT 250 FOR IP): Performed by: STUDENT IN AN ORGANIZED HEALTH CARE EDUCATION/TRAINING PROGRAM

## 2022-11-22 PROCEDURE — 87635 SARS-COV-2 COVID-19 AMP PRB: CPT

## 2022-11-22 PROCEDURE — 93306 TTE W/DOPPLER COMPLETE: CPT

## 2022-11-22 PROCEDURE — 81001 URINALYSIS AUTO W/SCOPE: CPT

## 2022-11-22 PROCEDURE — 84443 ASSAY THYROID STIM HORMONE: CPT

## 2022-11-22 PROCEDURE — 71045 X-RAY EXAM CHEST 1 VIEW: CPT

## 2022-11-22 PROCEDURE — 2060000000 HC ICU INTERMEDIATE R&B

## 2022-11-22 PROCEDURE — 2500000003 HC RX 250 WO HCPCS: Performed by: INTERNAL MEDICINE

## 2022-11-22 PROCEDURE — 93005 ELECTROCARDIOGRAM TRACING: CPT | Performed by: STUDENT IN AN ORGANIZED HEALTH CARE EDUCATION/TRAINING PROGRAM

## 2022-11-22 PROCEDURE — 96374 THER/PROPH/DIAG INJ IV PUSH: CPT

## 2022-11-22 PROCEDURE — 72125 CT NECK SPINE W/O DYE: CPT

## 2022-11-22 PROCEDURE — 99223 1ST HOSP IP/OBS HIGH 75: CPT | Performed by: INTERNAL MEDICINE

## 2022-11-22 PROCEDURE — 83605 ASSAY OF LACTIC ACID: CPT

## 2022-11-22 PROCEDURE — 6360000002 HC RX W HCPCS: Performed by: INTERNAL MEDICINE

## 2022-11-22 PROCEDURE — 73521 X-RAY EXAM HIPS BI 2 VIEWS: CPT

## 2022-11-22 PROCEDURE — 84484 ASSAY OF TROPONIN QUANT: CPT

## 2022-11-22 PROCEDURE — 99285 EMERGENCY DEPT VISIT HI MDM: CPT

## 2022-11-22 PROCEDURE — 85025 COMPLETE CBC W/AUTO DIFF WBC: CPT

## 2022-11-22 PROCEDURE — 70450 CT HEAD/BRAIN W/O DYE: CPT

## 2022-11-22 PROCEDURE — 85730 THROMBOPLASTIN TIME PARTIAL: CPT

## 2022-11-22 RX ORDER — FOLIC ACID 1 MG/1
1 TABLET ORAL DAILY
Status: DISCONTINUED | OUTPATIENT
Start: 2022-11-23 | End: 2022-11-28 | Stop reason: HOSPADM

## 2022-11-22 RX ORDER — TIZANIDINE 4 MG/1
4 TABLET ORAL EVERY 8 HOURS PRN
Status: DISCONTINUED | OUTPATIENT
Start: 2022-11-22 | End: 2022-11-28 | Stop reason: HOSPADM

## 2022-11-22 RX ORDER — CYANOCOBALAMIN 1000 UG/ML
1000 INJECTION, SOLUTION INTRAMUSCULAR; SUBCUTANEOUS
Status: DISCONTINUED | OUTPATIENT
Start: 2022-11-22 | End: 2022-11-28 | Stop reason: HOSPADM

## 2022-11-22 RX ORDER — ASPIRIN 81 MG/1
324 TABLET, CHEWABLE ORAL ONCE
Status: COMPLETED | OUTPATIENT
Start: 2022-11-22 | End: 2022-11-22

## 2022-11-22 RX ORDER — HEPARIN SODIUM 1000 [USP'U]/ML
2000 INJECTION, SOLUTION INTRAVENOUS; SUBCUTANEOUS PRN
Status: DISCONTINUED | OUTPATIENT
Start: 2022-11-22 | End: 2022-11-24

## 2022-11-22 RX ORDER — LOSARTAN POTASSIUM 50 MG/1
100 TABLET ORAL DAILY
Status: DISCONTINUED | OUTPATIENT
Start: 2022-11-23 | End: 2022-11-23

## 2022-11-22 RX ORDER — ATORVASTATIN CALCIUM 40 MG/1
40 TABLET, FILM COATED ORAL NIGHTLY
Status: DISCONTINUED | OUTPATIENT
Start: 2022-11-22 | End: 2022-11-28 | Stop reason: HOSPADM

## 2022-11-22 RX ORDER — HEPARIN SODIUM 1000 [USP'U]/ML
4000 INJECTION, SOLUTION INTRAVENOUS; SUBCUTANEOUS PRN
Status: DISCONTINUED | OUTPATIENT
Start: 2022-11-22 | End: 2022-11-24

## 2022-11-22 RX ORDER — FERROUS SULFATE 325(65) MG
325 TABLET ORAL
Status: DISCONTINUED | OUTPATIENT
Start: 2022-11-23 | End: 2022-11-28 | Stop reason: HOSPADM

## 2022-11-22 RX ORDER — ASPIRIN 81 MG/1
81 TABLET ORAL ONCE
Status: DISCONTINUED | OUTPATIENT
Start: 2022-11-22 | End: 2022-11-22 | Stop reason: ALTCHOICE

## 2022-11-22 RX ORDER — LACTOBACILLUS RHAMNOSUS GG 10B CELL
1 CAPSULE ORAL DAILY
Status: DISCONTINUED | OUTPATIENT
Start: 2022-11-23 | End: 2022-11-28 | Stop reason: HOSPADM

## 2022-11-22 RX ORDER — HEPARIN SODIUM 1000 [USP'U]/ML
4000 INJECTION, SOLUTION INTRAVENOUS; SUBCUTANEOUS ONCE
Status: COMPLETED | OUTPATIENT
Start: 2022-11-22 | End: 2022-11-22

## 2022-11-22 RX ORDER — CLOPIDOGREL BISULFATE 75 MG/1
75 TABLET ORAL DAILY
Status: DISCONTINUED | OUTPATIENT
Start: 2022-11-22 | End: 2022-11-28 | Stop reason: HOSPADM

## 2022-11-22 RX ORDER — LEVOTHYROXINE SODIUM 88 UG/1
88 TABLET ORAL DAILY
Status: DISCONTINUED | OUTPATIENT
Start: 2022-11-23 | End: 2022-11-28 | Stop reason: HOSPADM

## 2022-11-22 RX ORDER — CETIRIZINE HYDROCHLORIDE 10 MG/1
5 TABLET ORAL DAILY
Status: DISCONTINUED | OUTPATIENT
Start: 2022-11-23 | End: 2022-11-28 | Stop reason: HOSPADM

## 2022-11-22 RX ORDER — METOPROLOL SUCCINATE 25 MG/1
25 TABLET, EXTENDED RELEASE ORAL DAILY
Status: DISCONTINUED | OUTPATIENT
Start: 2022-11-22 | End: 2022-11-25

## 2022-11-22 RX ORDER — HYDROCODONE BITARTRATE AND ACETAMINOPHEN 5; 325 MG/1; MG/1
1 TABLET ORAL EVERY 6 HOURS PRN
Status: DISCONTINUED | OUTPATIENT
Start: 2022-11-22 | End: 2022-11-23

## 2022-11-22 RX ORDER — ASPIRIN 81 MG/1
81 TABLET, CHEWABLE ORAL DAILY
Status: DISCONTINUED | OUTPATIENT
Start: 2022-11-23 | End: 2022-11-28 | Stop reason: HOSPADM

## 2022-11-22 RX ORDER — PANTOPRAZOLE SODIUM 40 MG/1
40 TABLET, DELAYED RELEASE ORAL DAILY
Status: DISCONTINUED | OUTPATIENT
Start: 2022-11-23 | End: 2022-11-28 | Stop reason: HOSPADM

## 2022-11-22 RX ORDER — ASPIRIN 81 MG/1
81 TABLET, CHEWABLE ORAL DAILY
Status: DISCONTINUED | OUTPATIENT
Start: 2022-11-23 | End: 2022-11-22

## 2022-11-22 RX ORDER — CHOLECALCIFEROL (VITAMIN D3) 50 MCG
2000 TABLET ORAL DAILY
Status: DISCONTINUED | OUTPATIENT
Start: 2022-11-23 | End: 2022-11-28 | Stop reason: HOSPADM

## 2022-11-22 RX ORDER — HEPARIN SODIUM 10000 [USP'U]/100ML
5-30 INJECTION, SOLUTION INTRAVENOUS CONTINUOUS
Status: DISCONTINUED | OUTPATIENT
Start: 2022-11-22 | End: 2022-11-24

## 2022-11-22 RX ADMIN — PETROLATUM: 420 OINTMENT TOPICAL at 21:22

## 2022-11-22 RX ADMIN — METOPROLOL SUCCINATE 25 MG: 25 TABLET, EXTENDED RELEASE ORAL at 18:25

## 2022-11-22 RX ADMIN — TIZANIDINE 4 MG: 4 TABLET ORAL at 21:42

## 2022-11-22 RX ADMIN — ASPIRIN 81 MG CHEWABLE TABLET 324 MG: 81 TABLET CHEWABLE at 12:32

## 2022-11-22 RX ADMIN — HEPARIN SODIUM 16 UNITS/KG/HR: 10000 INJECTION, SOLUTION INTRAVENOUS at 16:37

## 2022-11-22 RX ADMIN — MICONAZOLE NITRATE: 2 POWDER TOPICAL at 21:22

## 2022-11-22 RX ADMIN — CYANOCOBALAMIN 1000 MCG: 1000 INJECTION, SOLUTION INTRAMUSCULAR at 21:21

## 2022-11-22 RX ADMIN — DESMOPRESSIN ACETATE 40 MG: 0.2 TABLET ORAL at 21:21

## 2022-11-22 RX ADMIN — HYDROCODONE BITARTRATE AND ACETAMINOPHEN 1 TABLET: 5; 325 TABLET ORAL at 18:25

## 2022-11-22 RX ADMIN — CLOPIDOGREL BISULFATE 75 MG: 75 TABLET ORAL at 18:25

## 2022-11-22 RX ADMIN — HEPARIN SODIUM 4000 UNITS: 1000 INJECTION INTRAVENOUS; SUBCUTANEOUS at 13:51

## 2022-11-22 RX ADMIN — HEPARIN SODIUM 4000 UNITS: 1000 INJECTION INTRAVENOUS; SUBCUTANEOUS at 16:38

## 2022-11-22 RX ADMIN — HEPARIN SODIUM 12 UNITS/KG/HR: 10000 INJECTION, SOLUTION INTRAVENOUS at 13:54

## 2022-11-22 ASSESSMENT — PAIN DESCRIPTION - DESCRIPTORS: DESCRIPTORS: THROBBING

## 2022-11-22 ASSESSMENT — PAIN SCALES - GENERAL
PAINLEVEL_OUTOF10: 4
PAINLEVEL_OUTOF10: 5
PAINLEVEL_OUTOF10: 5

## 2022-11-22 ASSESSMENT — ENCOUNTER SYMPTOMS
EYE DISCHARGE: 0
NAUSEA: 0
WHEEZING: 0
SHORTNESS OF BREATH: 0
SINUS PRESSURE: 0
DIARRHEA: 0
BACK PAIN: 0
SORE THROAT: 0
ABDOMINAL PAIN: 0
VOMITING: 0
COUGH: 0
EYE PAIN: 0
EYE REDNESS: 0

## 2022-11-22 ASSESSMENT — PAIN - FUNCTIONAL ASSESSMENT: PAIN_FUNCTIONAL_ASSESSMENT: NONE - DENIES PAIN

## 2022-11-22 ASSESSMENT — PAIN DESCRIPTION - LOCATION
LOCATION: BACK

## 2022-11-22 ASSESSMENT — PAIN DESCRIPTION - ORIENTATION: ORIENTATION: MID;LOWER

## 2022-11-22 NOTE — CONSULTS
Inpatient Cardiology Consultation      Reason for Consult:  ? NSTEMI    Consulting Physician: Dr. Meagan Glasgow    Requesting Physician:  Dr. Ismael Castellon     Date of Consultation: 11/22/2022    HISTORY OF PRESENT ILLNESS:     Mr. Raj Arroyo is an 78 yr old white male, not previously known to [facility]. Denies ever seeing a cardiologist or having prior cardiac testing. PMH:  DM II, HTN, Active Tobacco Abuse, HLD, Hypothyroidism, CKD, Chronic Pain, Prostate CA, chronic anemia     [facility] ED: 11/22/2022 EMS for generalized weakness & mechanical falls  ED Vitals: T 97.8 HR 96 RR 16 /71 - 165/79 SPO2 95% RA BMI 24.11  ED Labs  HS Trop 754  BUN 21 Cr 1.2 Lactic Acid 2.8 WBC 13.2   COVID / Influenza rapids all negative   ED Rad:  CT Head no acute (cyst in occipital SC tissue) / hip X Ray & CXR - negative   ED Treatment:  Aspirin 324     Seen & Examined in ED:   Patient's daughter - who lives with Mr. Raj Arroyo - reports that since 10/30/22, patient has had increased weakness in his legs. She has had to use a wheelchair and bed side commode to care for him. He has fallen twice while trying to get off Bedside commode - witnessed- mechanical - no evidence of syncope or LOC. Daughter has had difficult time getting him up herself. Patient notes this weakness has progressed to the point where he cannot \"hold his weight\". Denies chest pain, sob, encarnacion, palpations, dizziness, orthopnea, PND, leg swelling, weight changes, recent cold / virus. Daughter makes his meals and says he has been eating, drinking, and taking his meds well. Daughter is concerned that she cannot care for him. Currently, no acute distress at rest on RA. Please note: past medical records were reviewed per electronic medical record (EMR) - see detailed reports under Past Medical/ Surgical History.      Past Medical History:    DM II  HTN  HLD  CKD (Cr range 1.2 - 1.4)   Active Tobacco Abuse  Chronic Anemia - iron supplement  Hgb 2/22/22 9.4 & 9/13/22 12.8  Hypothyroidism on HRT  Small hiatal hernia per EGD 3/22 Dr. Rubén Lehman   DISH (diffuse idiopathic skeletal hyperostosis) / Chronic back pain - chronic Norco   Prostate CA dx 2018 - refused treatment with radiation - refused ADT therapy due to hot flashes - per Dr. Izabella Barhaona note 11/2021  [place] [facility]  Hx ERCP 2014 Dr. Surekha Melgoza CBD: DILATED WITH MULTIPLE STONES PAPILLOTOMY DONE AND 13 LARGE STONES AVERAGING 1.5 CM  WERE REMOVED VIA BALLOON SWEEPING  Vit D Def   Hearing loss   Denies ever seeing a cardiologist or having prior cardiac testing. Past Surgical History:    Past Surgical History:   Procedure Laterality Date    CATARACT REMOVAL WITH IMPLANT Bilateral 1998    Dr. Radha Woodard  2015    Dr. Surekha Melgoza    COLONOSCOPY N/A 3/18/2022    COLONOSCOPY DIAGNOSTIC performed by Venkat Lopes MD at [facility] ENDOSCOPY    ERCP  07/18/2014    13 stones removed     Netta Melton [facility] of [facility] of cholesteatoma     SINUS SURGERY  2002    repair of nasal septum    UPPER GASTROINTESTINAL ENDOSCOPY N/A 3/18/2022    EGD BIOPSY performed by Venkat Lopes MD at [facility] ENDOSCOPY         Medications Prior to admit:    Daughter says he is compliant with all meds     Prior to Admission medications    Medication Sig Start Date End Date Taking? Authorizing Provider   HYDROcodone-acetaminophen (NORCO) 5-325 MG per tablet Take 1 tablet by mouth every 6 hours as needed for Pain for up to 30 days.  11/9/22 12/9/22  Samanta Vaughn MD   tiZANidine (ZANAFLEX) 4 MG tablet TAKE 1 TABLET BY MOUTH EVERY 8 HOURS AS NEEDED FOR PAIN 11/1/22   Samanta Vaughn MD   FEROSUL 325 (65 Fe) MG tablet TAKE 1 TABLET BY MOUTH DAILY WITH BREAKFAST 10/4/22   Samanta Vaughn MD   metFORMIN (GLUCOPHAGE) 500 MG tablet TAKE 1 TABLET BY MOUTH TWICE DAILY WITH MEALS 9/15/22   EARL Fair - CNP   amLODIPine (NORVASC) 2.5 MG tablet TAKE 1 TABLET BY MOUTH DAILY 8/11/22   Arelis Suresh MD   pantoprazole (PROTONIX) 40 MG tablet TAKE 1 TABLET BY MOUTH DAILY 8/8/22   Arelis Suresh MD   cyanocobalamin 1000 MCG/ML injection INJECT 1ML INTO THE MUSCLE EVERY 14 DAYS 7/29/22   Arelis Suresh MD   folic acid (FOLVITE) 1 MG tablet TAKE 1 TABLET BY MOUTH DAILY 7/12/22   Arelis Suresh MD   losartan (COZAAR) 100 MG tablet TAKE 1 TABLET BY MOUTH DAILY 7/12/22   Arelis Suresh MD   levothyroxine (SYNTHROID) 88 MCG tablet TAKE 1 TABLET BY MOUTH EVERY DAY 6/24/22   Arelis Suresh MD   diclofenac sodium (VOLTAREN) 1 % GEL Apply 4 g topically 4 times daily as needed for Pain 2/15/22   Delmi Batres APRN - CNP   Tuberculin-Allergy Syringes (B-D TB SYRINGE .5CC/27GX1/2\") 27G X 1/2\" 0.5 ML MISC 1 each by Does not apply route every 14 days 11/9/21   Arelis Suresh MD   Probiotic Product (PROBIOTIC DAILY PO) Take by mouth    Historical Provider, MD   loratadine (CLARITIN) 10 MG tablet Take 10 mg by mouth daily     Historical Provider, MD   Cholecalciferol (VITAMIN D3) 2000 UNITS CAPS Take 1 capsule by mouth daily. Last dose 7/16/2014    Historical Provider, MD   aspirin 81 MG tablet Take 81 mg by mouth daily. Last dose 7/15/2014    Historical Provider, MD   Multiple Vitamins-Minerals (THERAPEUTIC MULTIVITAMIN-MINERALS) tablet Take 1 tablet by mouth daily. Last dose 7/16/2014    Historical Provider, MD       Current Medications:    Current Facility-Administered Medications: methylPREDNISolone acetate (DEPO-MEDROL) injection 80 mg, 80 mg, IntraMUSCular, Once  lidocaine 1 % injection 1 mL, 1 mL, Other, Once    Allergies:  Penicillins and Sulfa antibiotics    Social History:          Daughter lives with him     Tobacco Active 1/2 PPD for [employer] years\"  ETOH: Denies   Illicit: Denies    Functional Capacity - limited see HPI     Family History: noncontributory secondary to age    REVIEW OF SYSTEMS:     Constitutional:  + fatigue, Denies fevers, chills or night sweats  Eyes: Denies visual changes or drainage  ENT: Denies headaches - chronic hearing loss. No mouth sores or sore throat. No epistaxis   Cardiovascular: Denies chest pain, pressure or palpitations. No lower extremity swelling. Respiratory: Denies TELLEZ, cough, orthopnea or PND. No hemoptysis   Gastrointestinal: Denies hematemesis or anorexia. No hematochezia or melena    Genitourinary: Denies urgency, dysuria or hematuria. Musculoskeletal: see HPI  Integumentary: Denies rash, hives or pruritis   Neurological: Denies dizziness, headaches or seizures. No numbness or tingling  Psychiatric: Denies anxiety or depression. Endocrine: Denies temperature intolerance. No recent weight change. .  Hematologic/Lymphatic: Denies abnormal bruising or bleeding. No swollen lymph nodes    PHYSICAL EXAM:       BP (!) 165/79   Pulse 84   Temp 97.8 °F (36.6 °C)   Resp 16   Wt 168 lb (76.2 kg)   SpO2 95%   BMI 24.11 kg/m²   CONST:  Well developed, well nourished elderly white male, who appears of stated age. Awake, alert and cooperative. No apparent distress. HEENT:   Head- Normocephalic, atraumatic   Eyes- Conjunctivae pink  Throat- Oral mucosa pink and moist  Neck-  No stridor, trachea midline, no jugular venous distention. No carotid bruit. CHEST: Chest symmetrical and non-tender to palpation. No accessory muscle use or intercostal retractions  RESPIRATORY: Lung sounds - expiratory wheezes throughout fields   CARDIOVASCULAR:     Heart Inspection- shows no noted pulsations  Heart Palpation- no heaves or thrills  Heart Ausculation- Regular rate and rhythm, soft grade 1 aortic murmur. No s3, s4 or rub   PV: No lower extremity edema. No varicosities. Pedal pulses palpable, no clubbing or cyanosis   ABDOMEN: Soft, non-tender to light palpation. Bowel sounds present. No palpable masses   MS: Poor muscle strength and tone.  No atrophy or abnormal movements. : Deferred  SKIN: Warm and dry no statis dermatitis or ulcers / lower abdominal rash noted / ecchymosis to upper arms   NEURO / PSYCH: Oriented to person, place and time. Speech clear and appropriate. Follows all commands. Pleasant affect     DATA:    12 lead ECG: Per DR. Mathews     Tele strips: NSR 60's       No intake or output data in the 24 hours ending 11/22/22 1311    Labs:   CBC:   Recent Labs     11/22/22  1107   WBC 13.2*   HGB 12.2*   HCT 38.1        BMP:   Recent Labs     11/22/22  1107      K 5.0   CO2 25   BUN 21   CREATININE 1.2   LABGLOM 59   CALCIUM 9.0       TSH:   Recent Labs     11/22/22  1107   TSH 2.340     HgA1c:   Lab Results   Component Value Date    LABA1C 6.2 (H) 09/13/2022     CARDIAC ENZYMES:  Recent Labs     11/22/22  1107   TROPHS 754*      FASTING LIPID PANEL:  Lab Results   Component Value Date/Time    CHOL 178 09/13/2022 10:45 AM    HDL 40 09/13/2022 10:45 AM    LDLCALC 98 09/13/2022 10:45 AM    TRIG 199 09/13/2022 10:45 AM     LIVER PROFILE:  Recent Labs     11/22/22  1107   AST 20   ALT 8   LABALBU 3.7       Assessment & Plan: Per Dr. Ruba Lorenz    AElectronically signed by Ignacio Nageotte, APRN - CNS on 11/22/2022 at 1:11 PM      The above documentation has been prepared under my direction and personally reviewed by me in its entirety. I confirm that the note above accurately reflects all work, treatment, procedures, and medical decision making performed by me. The patient's history was independently obtained. The patient was independently examined. Electrocardiogram, prior and present cardiovascular assessment, and laboratory studies were reviewed. The patient is an 47-year-old white male with no association to [facility] and no previous documented structural heart disease. He has significant hearing loss with the majority of history provided by his daughter.   He has a reported history of hypertension, diabetes with associated stage IIIa chronic kidney disease, hyperlipidemia and chronic obstructive lung disease with active tobacco consumption. According to his daughter he is essentially homebound and becoming progressively debilitated with multiple recent mechanical falls including on the day prior to his hospitalization. She relates no symptoms of anginal-like chest discomfort or other ischemic equivalents have been noted including surrounding his presentation with no additional symptoms suggestive of decompensated left ventricular systolic dysfunction or volume overload. The predominant reason for his emergency room presentation was his progressive debilitation reaching the stage that she was no longer able to independently care for him. At the time of his emergency room presentation, a resting electrocardiogram reviewed at time of his assessment demonstrated evidence of sinus rhythm and suggested evidence of an evolving inferior infarct as well as that of a chest x-ray again reviewed suggesting no evidence of cardiomegaly with mild hyperinflation. Additional laboratory studies demonstrated evidence of elevation of high-sensitivity troponin level at time of presentation potentially consistent with his evolving infarct with evidence of stage IIIa chronic kidney disease and improvement of his serum creatinine levels from that of his most recent baseline with borderline hyperkalemia. At the time of evaluation upon direct questioning, he relates no active symptoms. At the time of evaluation, the patient's medications and allergies were reviewed as well as that of his past medical history and review of systems as documented. On examination, the patient presently appears in no discomfort nor distress and is hemodynamically stable with vital signs as documented. Jugular venous pressure is normal with no identified carotid bruits. Lung fields demonstrate a prolonged expiratory phase of respiration with mild bronchospasm.   Cardiac examination is notable for a regular rate and rhythm with no gallop rhythm in the presence of a soft systolic ejection murmur at the right upper sternal border as well as that of a soft diastolic decrescendo murmur at the lower left sternal border. A benign abdominal examination is present no peripheral edema identified. Diagnostic Assessment and Plan: On a clinical basis, and spite the absence of symptoms, the patient presents with suspicion of an evolving inferior infarct. At present, medical management appears appropriate with the initiation of intravenous heparin and cautiously that of a beta-blocker in view of his concomitant bronchospasm in addition to that of antiplatelet therapy and high-dose HMG coenzyme reductase therapy. At the time of his evaluation, his condition was extensively discussed with his daughter with plans of an echocardiogram to assess left ventricular and right ventricular function in addition to that of diastolic function to guide additional management recommendations. Based on his significant debilitation, a conservative management approach will be maintained and at time of evaluation, a discussion of advanced directives was held with his daughter. On a long-term basis, ongoing aggressive risk factor modification of blood pressure, diabetes and serum lipids will remain essential to reducing risk of future atherosclerotic development in addition to a discussion of his needs of smoking cessation both to reduce risk of further adverse effects in the face of his chronic obstructive lung disease as well as to reduce risk of future atherosclerotic development. Thank you for allowing me to participate in your patient's care. Please feel free to contact me if you have any questions or concerns. Shon Delcid.  Tata Sorto, [id number] [facility]

## 2022-11-23 LAB
ANION GAP SERPL CALCULATED.3IONS-SCNC: 9 MMOL/L (ref 7–16)
APTT: 56.4 SEC (ref 24.5–35.1)
APTT: 62 SEC (ref 24.5–35.1)
BUN BLDV-MCNC: 20 MG/DL (ref 6–23)
CALCIUM SERPL-MCNC: 8.7 MG/DL (ref 8.6–10.2)
CHLORIDE BLD-SCNC: 98 MMOL/L (ref 98–107)
CO2: 26 MMOL/L (ref 22–29)
CREAT SERPL-MCNC: 1.4 MG/DL (ref 0.7–1.2)
EKG ATRIAL RATE: 61 BPM
EKG P AXIS: 6 DEGREES
EKG P-R INTERVAL: 136 MS
EKG Q-T INTERVAL: 432 MS
EKG QRS DURATION: 78 MS
EKG QTC CALCULATION (BAZETT): 434 MS
EKG T AXIS: -25 DEGREES
EKG VENTRICULAR RATE: 61 BPM
GFR SERPL CREATININE-BSD FRML MDRD: 49 ML/MIN/1.73
GLUCOSE BLD-MCNC: 136 MG/DL (ref 74–99)
POTASSIUM SERPL-SCNC: 4.5 MMOL/L (ref 3.5–5)
REASON FOR REJECTION: NORMAL
REJECTED TEST: NORMAL
SODIUM BLD-SCNC: 133 MMOL/L (ref 132–146)
TROPONIN, HIGH SENSITIVITY: 981 NG/L (ref 0–11)

## 2022-11-23 PROCEDURE — 6360000002 HC RX W HCPCS: Performed by: STUDENT IN AN ORGANIZED HEALTH CARE EDUCATION/TRAINING PROGRAM

## 2022-11-23 PROCEDURE — 93010 ELECTROCARDIOGRAM REPORT: CPT | Performed by: INTERNAL MEDICINE

## 2022-11-23 PROCEDURE — 36415 COLL VENOUS BLD VENIPUNCTURE: CPT

## 2022-11-23 PROCEDURE — 6370000000 HC RX 637 (ALT 250 FOR IP): Performed by: INTERNAL MEDICINE

## 2022-11-23 PROCEDURE — 85730 THROMBOPLASTIN TIME PARTIAL: CPT

## 2022-11-23 PROCEDURE — 93005 ELECTROCARDIOGRAM TRACING: CPT | Performed by: INTERNAL MEDICINE

## 2022-11-23 PROCEDURE — 2500000003 HC RX 250 WO HCPCS: Performed by: INTERNAL MEDICINE

## 2022-11-23 PROCEDURE — 99233 SBSQ HOSP IP/OBS HIGH 50: CPT | Performed by: INTERNAL MEDICINE

## 2022-11-23 PROCEDURE — 80048 BASIC METABOLIC PNL TOTAL CA: CPT

## 2022-11-23 PROCEDURE — 2060000000 HC ICU INTERMEDIATE R&B

## 2022-11-23 PROCEDURE — 84484 ASSAY OF TROPONIN QUANT: CPT

## 2022-11-23 RX ORDER — AMLODIPINE BESYLATE 2.5 MG/1
2.5 TABLET ORAL DAILY
Status: DISCONTINUED | OUTPATIENT
Start: 2022-11-23 | End: 2022-11-25

## 2022-11-23 RX ORDER — HYDROCODONE BITARTRATE AND ACETAMINOPHEN 10; 325 MG/1; MG/1
1 TABLET ORAL EVERY 6 HOURS PRN
Status: DISCONTINUED | OUTPATIENT
Start: 2022-11-23 | End: 2022-11-28 | Stop reason: HOSPADM

## 2022-11-23 RX ADMIN — FOLIC ACID 1 MG: 1 TABLET ORAL at 08:42

## 2022-11-23 RX ADMIN — HYDROCODONE BITARTRATE AND ACETAMINOPHEN 1 TABLET: 10; 325 TABLET ORAL at 20:25

## 2022-11-23 RX ADMIN — CLOPIDOGREL BISULFATE 75 MG: 75 TABLET ORAL at 08:42

## 2022-11-23 RX ADMIN — PETROLATUM: 420 OINTMENT TOPICAL at 09:00

## 2022-11-23 RX ADMIN — HYDROCODONE BITARTRATE AND ACETAMINOPHEN 1 TABLET: 5; 325 TABLET ORAL at 03:50

## 2022-11-23 RX ADMIN — PETROLATUM: 420 OINTMENT TOPICAL at 20:25

## 2022-11-23 RX ADMIN — SACUBITRIL AND VALSARTAN 1 TABLET: 49; 51 TABLET, FILM COATED ORAL at 09:14

## 2022-11-23 RX ADMIN — TIZANIDINE 4 MG: 4 TABLET ORAL at 20:25

## 2022-11-23 RX ADMIN — METFORMIN HYDROCHLORIDE 500 MG: 500 TABLET ORAL at 08:42

## 2022-11-23 RX ADMIN — MICONAZOLE NITRATE: 2 POWDER TOPICAL at 09:01

## 2022-11-23 RX ADMIN — Medication 2000 UNITS: at 09:00

## 2022-11-23 RX ADMIN — LEVOTHYROXINE SODIUM 88 MCG: 0.09 TABLET ORAL at 06:30

## 2022-11-23 RX ADMIN — SACUBITRIL AND VALSARTAN 1 TABLET: 49; 51 TABLET, FILM COATED ORAL at 20:25

## 2022-11-23 RX ADMIN — FERROUS SULFATE TAB 325 MG (65 MG ELEMENTAL FE) 325 MG: 325 (65 FE) TAB at 08:42

## 2022-11-23 RX ADMIN — Medication 1 CAPSULE: at 08:42

## 2022-11-23 RX ADMIN — HYDROCODONE BITARTRATE AND ACETAMINOPHEN 1 TABLET: 10; 325 TABLET ORAL at 09:51

## 2022-11-23 RX ADMIN — DESMOPRESSIN ACETATE 40 MG: 0.2 TABLET ORAL at 20:25

## 2022-11-23 RX ADMIN — METOPROLOL SUCCINATE 25 MG: 25 TABLET, EXTENDED RELEASE ORAL at 08:42

## 2022-11-23 RX ADMIN — CETIRIZINE HYDROCHLORIDE 5 MG: 10 TABLET, FILM COATED ORAL at 08:42

## 2022-11-23 RX ADMIN — HEPARIN SODIUM 13 UNITS/KG/HR: 10000 INJECTION, SOLUTION INTRAVENOUS at 19:12

## 2022-11-23 RX ADMIN — AMLODIPINE BESYLATE 2.5 MG: 2.5 TABLET ORAL at 09:00

## 2022-11-23 RX ADMIN — ASPIRIN 81 MG CHEWABLE TABLET 81 MG: 81 TABLET CHEWABLE at 08:42

## 2022-11-23 RX ADMIN — PANTOPRAZOLE SODIUM 40 MG: 40 TABLET, DELAYED RELEASE ORAL at 08:42

## 2022-11-23 RX ADMIN — MICONAZOLE NITRATE: 2 POWDER TOPICAL at 20:26

## 2022-11-23 ASSESSMENT — PAIN DESCRIPTION - DESCRIPTORS: DESCRIPTORS: ACHING;DISCOMFORT

## 2022-11-23 ASSESSMENT — PAIN DESCRIPTION - PAIN TYPE: TYPE: CHRONIC PAIN

## 2022-11-23 ASSESSMENT — PAIN DESCRIPTION - ONSET: ONSET: ON-GOING

## 2022-11-23 ASSESSMENT — PAIN DESCRIPTION - LOCATION
LOCATION: BACK
LOCATION: GENERALIZED

## 2022-11-23 ASSESSMENT — PAIN DESCRIPTION - ORIENTATION: ORIENTATION: LOWER

## 2022-11-23 ASSESSMENT — PAIN SCALES - GENERAL
PAINLEVEL_OUTOF10: 7
PAINLEVEL_OUTOF10: 3
PAINLEVEL_OUTOF10: 0
PAINLEVEL_OUTOF10: 5

## 2022-11-23 ASSESSMENT — PAIN DESCRIPTION - FREQUENCY: FREQUENCY: CONTINUOUS

## 2022-11-23 ASSESSMENT — PAIN - FUNCTIONAL ASSESSMENT: PAIN_FUNCTIONAL_ASSESSMENT: PREVENTS OR INTERFERES SOME ACTIVE ACTIVITIES AND ADLS

## 2022-11-23 NOTE — H&P
[facility]  History and Physical      CHIEF COMPLAINT:  falls,ataxia    Reason for Admission:  NSTEMI    History Obtained From:  patient's daughter    PCP :  Keke Alicia MD    [address] / [facility] [address]      HISTORY OF PRESENT ILLNESS:      The patient is a 78 y.o. male presented to the ED with falls. He has been weak lately and has been falling. Daughter having issues with helping him as he is heavy. He did not complain of chest pain or SOBOE. He is a current smoker. In the ED he was noted to have elevated troponin. Echo showed wall motion abnormalities. Daughter is requesting medical management only and no heart cath. Decision made to keep patient at [facility] and attempt medical management only.      Past Medical History:        Diagnosis Date    Anemia     Aortic valve sclerosis 7/4/2014    no  cardiology, no treatment, no s/s    Cancer ([facility] [address] 10/2018    prostate, no active issues    Diabetes mellitus ([facility] [address]     borderline,was on insulin and metformin , last dose 7/4/2014,     DISH (diffuse idiopathic skeletal hyperostosis) 7/4/2014    [place] (hard of hearing)     will not use aides    HTN (hypertension) 7/4/2014    Hyperlipidemia     Hypertension     bp has been slightly above normal    Hypothyroid 7/4/2014    Thyroid disease     Tobacco abuse 7/4/2014    Vertigo 1994    Vitamin B 12 deficiency 7/4/2014    Vitamin D deficiency      Past Surgical History:        Procedure Laterality Date    CATARACT REMOVAL WITH IMPLANT Bilateral 1998    Dr. Leisa Stiles  2015    Dr. Elsa Hernandez    COLONOSCOPY N/A 3/18/2022    COLONOSCOPY DIAGNOSTIC performed by Harvey Nevarez MD at [facility] ENDOSCOPY    ERCP  07/18/2014    13 stones removed     Betsy Good Courser of cholesteatoma    Betsy Urbina of cholesteatoma     SINUS SURGERY  2002    repair of nasal septum    UPPER GASTROINTESTINAL ENDOSCOPY N/A 3/18/2022    EGD BIOPSY performed by Gerri Woodard MD at [facility] ENDOSCOPY         Medications Prior to Admission:    Medications Prior to Admission: HYDROcodone-acetaminophen ([address]) 5-325 MG per tablet, Take 1 tablet by mouth every 6 hours as needed for Pain for up to 30 days. tiZANidine (ZANAFLEX) 4 MG tablet, TAKE 1 TABLET BY MOUTH EVERY 8 HOURS AS NEEDED FOR PAIN  FEROSUL 325 (65 Fe) MG tablet, TAKE 1 TABLET BY MOUTH DAILY WITH BREAKFAST  metFORMIN (GLUCOPHAGE) 500 MG tablet, TAKE 1 TABLET BY MOUTH TWICE DAILY WITH MEALS  amLODIPine (NORVASC) 2.5 MG tablet, TAKE 1 TABLET BY MOUTH DAILY  pantoprazole (PROTONIX) 40 MG tablet, TAKE 1 TABLET BY MOUTH DAILY  cyanocobalamin 1000 MCG/ML injection, INJECT 1ML INTO THE MUSCLE EVERY 14 DAYS (Patient taking differently: Inject 1,000 mcg into the muscle every 14 days LD: 17/2/5469)  folic acid (FOLVITE) 1 MG tablet, TAKE 1 TABLET BY MOUTH DAILY  losartan (COZAAR) 100 MG tablet, TAKE 1 TABLET BY MOUTH DAILY  levothyroxine (SYNTHROID) 88 MCG tablet, TAKE 1 TABLET BY MOUTH EVERY DAY  diclofenac sodium (VOLTAREN) 1 % GEL, Apply 4 g topically 4 times daily as needed for Pain  Tuberculin-Allergy Syringes (B-D TB SYRINGE .5CC/27GX1/2\") 27G X 1/2\" 0.5 ML MISC, 1 each by Does not apply route every 14 days  Probiotic Product (PROBIOTIC DAILY PO), Take by mouth  loratadine (CLARITIN) 10 MG tablet, Take 10 mg by mouth daily   Cholecalciferol (VITAMIN D3) 2000 UNITS CAPS, Take 1 capsule by mouth daily. Last dose 7/16/2014  aspirin 81 MG tablet, Take 81 mg by mouth daily. Last dose 7/15/2014    Allergies:  Penicillins and Sulfa antibiotics    Social History:   Social History     Socioeconomic History    Marital status:       Spouse name: Not on file    Number of children: 5    Years of education: Not on file    Highest education level: Not on file   Occupational History    Occupation: retired     Comment: glass installation   Tobacco Use Smoking status: Every Day     Packs/day: 0.50     Years: 65.00     Pack years: 32.50     Types: Cigarettes    Smokeless tobacco: Never   Vaping Use    Vaping Use: Never used   Substance and Sexual Activity    Alcohol use: Never     Alcohol/week: 0.0 standard drinks    Drug use: Never    Sexual activity: Not on file   Other Topics Concern    Not on file   Social History Narrative    Not on file     Social Determinants of Health     Financial Resource Strain: Low Risk     Difficulty of Paying Living Expenses: Not hard at all   Food Insecurity: No Food Insecurity    Worried About Running Out of Food in the Last Year: Never true    Ran Out of Food in the Last Year: Never true   Transportation Needs: Not on file   Physical Activity: Inactive    Days of Exercise per Week: 0 days    Minutes of Exercise per Session: 0 min   Stress: Not on file   Social Connections: Not on file   Intimate Partner Violence: Not on file   Housing Stability: Not on file         Family History:       Problem Relation Age of Onset    Thyroid Disease Mother     Other Mother         COPD, pulmonary embolism    Stroke Father     Other Sister         lung issues    Prostate Cancer Brother     Arthritis Sister     Arthritis Sister     Arthritis Sister        REVIEW OF SYSTEMS:    General ROS: positive for generalized weakness and falls  Hematological and Lymphatic ROS: negative  Endocrine ROS: negative  Respiratory ROS: no cough,  wheezing  or shortness of breath,   Cardiovascular ROS: no chest pain or dyspnea on exertion  Gastrointestinal ROS: no abdominal pain, change in bowel habits, or black or bloody stools  Genito-Urinary ROS: no dysuria, trouble voiding, or hematuria  Neurological ROS: no TIA or stroke symptoms  negative    Vitals:  BP (!) 156/60   Pulse 69   Temp 98.2 °F (36.8 °C) (Oral)   Resp 18   Wt 172 lb (78 kg)   SpO2 98%   BMI 24.68 kg/m²     PHYSICAL EXAM:  General:  Awake, alert, oriented X 3.   Well developed, well nourished, well groomed. No apparent distress. HEENT:  Normocephalic, atraumatic. Pupils equal, round, reactive to light. No scleral icterus. No conjunctival injection. Neck:  Supple, no carotid bruits  Heart:  RRR,   Lungs:  CTA bilaterally, bilat symmetrical expansion, no wheeze, rales, or rhonchi  Abdomen:   Bowel sounds present, soft, nontender, no masses, no organomegaly, no peritoneal signs  Extremities:  No clubbing, cyanosis, or edema  Skin:  Warm and dry, no open lesions or rash  Neuro:  Cranial nerves 2-12 intact, no focal deficits      DATA:     Recent Results (from the past 24 hour(s))   CBC with Auto Differential    Collection Time: 11/22/22 11:07 AM   Result Value Ref Range    WBC 13.2 (H) 4.5 - 11.5 E9/L    RBC 3.95 3.80 - 5.80 E12/L    Hemoglobin 12.2 (L) 12.5 - 16.5 g/dL    Hematocrit 38.1 37.0 - 54.0 %    MCV 96.5 80.0 - 99.9 fL    MCH 30.9 26.0 - 35.0 pg    MCHC 32.0 32.0 - 34.5 %    RDW 13.4 11.5 - 15.0 fL    Platelets 765 116 - 188 E9/L    MPV 10.8 7.0 - 12.0 fL    Neutrophils % 81.7 (H) 43.0 - 80.0 %    Immature Granulocytes % 0.4 0.0 - 5.0 %    Lymphocytes % 8.9 (L) 20.0 - 42.0 %    Monocytes % 7.3 2.0 - 12.0 %    Eosinophils % 1.4 0.0 - 6.0 %    Basophils % 0.3 0.0 - 2.0 %    Neutrophils Absolute 10.78 (H) 1.80 - 7.30 E9/L    Immature Granulocytes # 0.05 E9/L    Lymphocytes Absolute 1.18 (L) 1.50 - 4.00 E9/L    Monocytes Absolute 0.96 (H) 0.10 - 0.95 E9/L    Eosinophils Absolute 0.18 0.05 - 0.50 E9/L    Basophils Absolute 0.04 0.00 - 0.20 E9/L   Comprehensive Metabolic Panel w/ Reflex to MG    Collection Time: 11/22/22 11:07 AM   Result Value Ref Range    Sodium 133 132 - 146 mmol/L    Potassium reflex Magnesium 5.0 3.5 - 5.0 mmol/L    Chloride 97 (L) 98 - 107 mmol/L    CO2 25 22 - 29 mmol/L    Anion Gap 11 7 - 16 mmol/L    Glucose 152 (H) 74 - 99 mg/dL    BUN 21 6 - 23 mg/dL    Creatinine 1.2 0.7 - 1.2 mg/dL    Est, Glom Filt Rate 59 >=60 mL/min/1.73    Calcium 9.0 8.6 - 10.2 mg/dL    Total Protein 6.3 (L) 6.4 - 8.3 g/dL    Albumin 3.7 3.5 - 5.2 g/dL    Total Bilirubin 0.3 0.0 - 1.2 mg/dL    Alkaline Phosphatase 83 40 - 129 U/L    ALT 8 0 - 40 U/L    AST 20 0 - 39 U/L   Troponin    Collection Time: 11/22/22 11:07 AM   Result Value Ref Range    Troponin, High Sensitivity 754 (H) 0 - 11 ng/L   Urinalysis with Microscopic    Collection Time: 11/22/22 11:07 AM   Result Value Ref Range    Color, UA Yellow Straw/Yellow    Clarity, UA Clear Clear    Glucose, Ur Negative Negative mg/dL    Bilirubin Urine Negative Negative    Ketones, Urine Negative Negative mg/dL    Specific Gravity, UA >=1.030 1.005 - 1.030    Blood, Urine SMALL (A) Negative    pH, UA 6.0 5.0 - 9.0    Protein,  (A) Negative mg/dL    Urobilinogen, Urine 0.2 <2.0 E.U./dL    Nitrite, Urine Negative Negative    Leukocyte Esterase, Urine Negative Negative    WBC, UA NONE 0 - 5 /HPF    RBC, UA NONE 0 - 2 /HPF    Bacteria, UA NONE SEEN None Seen /HPF   COVID-19, Rapid    Collection Time: 11/22/22 11:07 AM    Specimen: Nasopharyngeal Swab   Result Value Ref Range    SARS-CoV-2, NAAT Not Detected Not Detected   Rapid influenza A/B antigens    Collection Time: 11/22/22 11:07 AM    Specimen: Nasopharyngeal   Result Value Ref Range    Influenza A by PCR Not Detected Not Detected    Influenza B by PCR Not Detected Not Detected   TSH    Collection Time: 11/22/22 11:07 AM   Result Value Ref Range    TSH 2.340 0.270 - 4.200 uIU/mL   Lactic Acid    Collection Time: 11/22/22 11:07 AM   Result Value Ref Range    Lactic Acid 2.8 (H) 0.5 - 2.2 mmol/L   EKG 12 Lead    Collection Time: 11/22/22 11:08 AM   Result Value Ref Range    Ventricular Rate 77 BPM    Atrial Rate 77 BPM    P-R Interval 120 ms    QRS Duration 86 ms    Q-T Interval 394 ms    QTc Calculation (Bazett) 445 ms    P Axis 7 degrees    R Axis 6 degrees    T Axis -36 degrees   Troponin    Collection Time: 11/22/22  1:09 PM   Result Value Ref Range    Troponin, High Sensitivity 749 (H) 0 - 11 ng/L APTT    Collection Time: 11/22/22  1:45 PM   Result Value Ref Range    aPTT 29.7 24.5 - 35.1 sec   Troponin    Collection Time: 11/22/22  7:15 PM   Result Value Ref Range    Troponin, High Sensitivity 927 (H) 0 - 11 ng/L   APTT    Collection Time: 11/22/22  9:50 PM   Result Value Ref Range    aPTT 187.3 (H) 24.5 - 35.1 sec   Troponin    Collection Time: 11/23/22  3:45 AM   Result Value Ref Range    Troponin, High Sensitivity 981 (H) 0 - 11 ng/L   EKG 12 Lead    Collection Time: 11/23/22  6:35 AM   Result Value Ref Range    Ventricular Rate 61 BPM    Atrial Rate 61 BPM    P-R Interval 136 ms    QRS Duration 78 ms    Q-T Interval 432 ms    QTc Calculation (Bazett) 434 ms    P Axis 6 degrees    T Axis -25 degrees   APTT    Collection Time: 11/23/22  6:42 AM   Result Value Ref Range    aPTT 56.4 (H) 24.5 - 35.1 sec       XR HIP BILATERAL W AP PELVIS (2 VIEWS)   Final Result   No acute abnormality of the pelvis and bilateral hips. IMPRESSION:   No acute osseous abnormality of the pelvis and bilateral bones. CT HEAD WO CONTRAST   Final Result   No acute intracranial abnormality. No acute osseous abnormality of the cervical spine. Degenerative changes of the cervical spine. 1.7 x 2.1 cm cyst visualized in the occipital subcutaneous soft tissues. CT CERVICAL SPINE WO CONTRAST   Final Result   No acute intracranial abnormality. No acute osseous abnormality of the cervical spine. Degenerative changes of the cervical spine. 1.7 x 2.1 cm cyst visualized in the occipital subcutaneous soft tissues. XR CHEST PORTABLE   Final Result   No acute process.                  ASSESSMENT :      Principal Problem:    Non-ST elevation myocardial infarction (NSTEMI) ([facility] [address]  Active Problems:    Coronary artery disease involving native coronary artery of native heart without angina pectoris    Pure hypercholesterolemia    Chronic obstructive pulmonary disease ([address]  Resolved Problems: * No resolved hospital problems. *    Ongoing tobacco abuse  Chronic pain syndrome    Plan :    Daughter is very clear on plan- medical management only  If worsens- hospice  She is unable to handle him at home at this time  With the current delays in arranging for home care,[facility] seems appropriate   consult for [facility]  Resume amlodipine given elevated BP    Electronically signed by Keke Alicia MD on 11/23/2022 at 8:32 AM    NOTE: This report was transcribed using voice recognition software.  Every effort was made to ensure accuracy; however, inadvertent transcription errors may be present

## 2022-11-23 NOTE — PLAN OF CARE
Problem: ABCDS Injury Assessment  Goal: Absence of physical injury  11/23/2022 1130 by Pinky Daugherty RN  Outcome: Progressing  11/23/2022 0540 by Yeny Danielle RN  Outcome: Progressing     Problem: Safety - Adult  Goal: Free from fall injury  11/23/2022 1130 by Pinky Daugherty RN  Outcome: Progressing  11/23/2022 0540 by Yeny Danielle RN  Outcome: Progressing     Problem: Pain  Goal: Verbalizes/displays adequate comfort level or baseline comfort level  11/23/2022 1130 by Pinky Daugherty RN  Outcome: Progressing  11/23/2022 0540 by Yeny Danielle RN  Outcome: Progressing     Problem: Discharge Planning  Goal: Discharge to home or other facility with appropriate resources  11/23/2022 1130 by Pinky Daugherty RN  Outcome: Progressing  11/23/2022 0540 by Yeny Danielle RN  Outcome: Progressing     Problem: Skin/Tissue Integrity  Goal: Absence of new skin breakdown  Description: 1. Monitor for areas of redness and/or skin breakdown  2. Assess vascular access sites hourly  3. Every 4-6 hours minimum:  Change oxygen saturation probe site  4. Every 4-6 hours:  If on nasal continuous positive airway pressure, respiratory therapy assess nares and determine need for appliance change or resting period.   11/23/2022 1130 by Pinky Daugherty RN  Outcome: Progressing  11/23/2022 0540 by Yeny Danielle RN  Outcome: Progressing     Problem: Chronic Conditions and Co-morbidities  Goal: Patient's chronic conditions and co-morbidity symptoms are monitored and maintained or improved  Outcome: Progressing

## 2022-11-23 NOTE — CONSULTS
Nutrition Assessment     Type and Reason for Visit: Initial, Consult ([place]e score consult)    Nutrition Recommendations/Plan:   Continue current diet and ONS, as tolerated  Continue inpatient monitoring       Nutrition Assessment:  Pt admit s/p multiple falls at home this month and NSTEMI. Pt/daughter wish no heart cath and medical management of cardiac status. Discharge planning for [facility]. PMH=DM, CKD, prostate CA, chronic anemia, vitamin B12 deficiency, vitamin D deficiency. Will add Diabetic ONS BID to optimize nutrient intake. No wound care consult or open wounds noted at this time. Nutrition Related Findings:   [place], A&Ox4, fatigue, TELLEZ, soft round abd +BS, I/O WNL, no edema Wound Type: None (rash groin, reddened buttocks)    Current Nutrition Therapies:    ADULT DIET;  Regular; 5 carb choices (75 gm/meal)    Anthropometric Measures:  Height: 5' 10\" (177.8 cm)  Current Body Wt: 172 lb (78 kg) (11/23 bedscale)   BMI: 24.7    Nutrition Interventions:   Food and/or Nutrient Delivery: Continue Current Diet, Start Oral Nutrition Supplement (Diabetic ONS BID)  Nutrition Education/Counseling: No recommendation at this time  Coordination of Nutrition Care: Continue to monitor while inpatient       Goals:     Goals: PO intake 75% or greater, by next RD assessment       Nutrition Monitoring and Evaluation:   Behavioral-Environmental Outcomes: None Identified  Food/Nutrient Intake Outcomes: Food and Nutrient Intake, Supplement Intake  Physical Signs/Symptoms Outcomes: Biochemical Data, GI Status, Fluid Status or Edema, Nutrition Focused Physical Findings, Skin, Weight    Discharge Planning:    Continue current diet     Heather Dasilva RD, CNSC, LD  Contact: [phone]

## 2022-11-23 NOTE — PROGRESS NOTES
INPATIENT CARDIOLOGY FOLLOW-UP    Name: Oxana Lerner    Age: 78 y.o. Date of Admission: 11/22/2022 10:43 AM    Date of Service: 11/23/2022    Chief Complaint: Follow-up for coronary atherosclerosis, non-ST elevation myocardial infarction, hypertension, chronic obstructive lung disease    Interim History: The patient denies any active cardiovascular symptoms with a repeat electrocardiogram continues to demonstrate evolution of his inferior infarct and an echocardiogram demonstrating mild left ventricular hypertrophy with regional wall motion abnormalities of the inferior, inferoseptal and inferolateral segments and mild left ventricular systolic dysfunction with associated stage I diastolic dysfunction and mild to moderate aortic insufficiency. He is earlier noted bronchospasm has resolved with persistent systolic hypertension noted. Review of Systems: The remainder of a complete multisystem review including consitutional, central nervous, respiratory, circulatory, gastrointestinal, genitourinary, endocrinologic, hematologic, musculoskeletal and psychiatric are negative. Problem List:  Patient Active Problem List   Diagnosis    Vitamin B 12 deficiency    DISH (diffuse idiopathic skeletal hyperostosis)    HTN (hypertension)    Lipids blood increased    Tobacco abuse    Type 2 diabetes mellitus (HCC)    Hypothyroid    Aortic valve sclerosis    Ione (hard of hearing)    Chronic pain syndrome    Prostate cancer ([address]    Stage 3a chronic kidney disease ([facility]    Type 2 diabetes mellitus with chronic kidney disease    Anemia    Chronic renal disease, stage III (HCC) [058541]    Non-ST elevation myocardial infarction (NSTEMI) ([address]    Coronary artery disease involving native coronary artery of native heart without angina pectoris    Pure hypercholesterolemia    Chronic obstructive pulmonary disease (HCC)       Allergies:   Allergies   Allergen Reactions    Penicillins     Sulfa Antibiotics        Current Medications:  Current Facility-Administered Medications   Medication Dose Route Frequency Provider Last Rate Last Admin    heparin (porcine) injection 4,000 Units  4,000 Units IntraVENous PRN Miladis Deisy, DO   4,000 Units at 11/22/22 1638    heparin (porcine) injection 2,000 Units  2,000 Units IntraVENous PRN Miladis Deisy, DO        heparin 25,000 units in dextrose 5% 250 mL (premix) infusion  5-30 Units/kg/hr IntraVENous Continuous Miladis Deisy, DO 9.9 mL/hr at 11/23/22 0021 13 Units/kg/hr at 11/23/22 0021    clopidogrel (PLAVIX) tablet 75 mg  75 mg Oral Daily Darvin Naylor MD   75 mg at 11/22/22 1825    metoprolol succinate (TOPROL XL) extended release tablet 25 mg  25 mg Oral Daily Darvin Naylor MD   25 mg at 11/22/22 1825    atorvastatin (LIPITOR) tablet 40 mg  40 mg Oral Nightly Warren Collins MD   40 mg at 11/22/22 2121    aspirin chewable tablet 81 mg  81 mg Oral Daily Warren Collins MD        vitamin D (CHOLECALCIFEROL) tablet 2,000 Units  2,000 Units Oral Daily Warren Collins MD        folic acid (FOLVITE) tablet 1 mg  1 mg Oral Daily Warren Collins MD        HYDROcodone-acetaminophen (NORCO) 5-325 MG per tablet 1 tablet  1 tablet Oral Q6H PRN Warren Collins MD   1 tablet at 11/23/22 0350    levothyroxine (SYNTHROID) tablet 88 mcg  88 mcg Oral Daily Warren Collins MD   88 mcg at 11/23/22 0630    cetirizine (ZYRTEC) tablet 5 mg  5 mg Oral Daily Warren Collins MD        losartan (COZAAR) tablet 100 mg  100 mg Oral Daily Warren Collins MD        pantoprazole (PROTONIX) tablet 40 mg  40 mg Oral Daily Warren Collins MD        cyanocobalamin injection 1,000 mcg  1,000 mcg IntraMUSCular Q14 Days Warren Collins MD   1,000 mcg at 11/22/22 2121    ferrous sulfate (IRON 325) tablet 325 mg  325 mg Oral Daily with breakfast Warren Collins MD        metFORMIN (GLUCOPHAGE) tablet 500 mg  500 mg Oral Daily with breakfast Dann WILEY Argenis Don MD        lactobacillus (CULTURELLE) capsule 1 capsule  1 capsule Oral Daily Angelique Noguera MD        tiZANidine (ZANAFLEX) tablet 4 mg  4 mg Oral Q8H PRN Angelique Noguera MD   4 mg at 11/22/22 2142    diclofenac sodium (VOLTAREN) 1 % gel 4 g  4 g Topical 4x Daily PRN Angelique Noguera MD        white petrolatum ointment   Topical BID Angelique Noguera MD   Given at 11/22/22 2122    miconazole (MICOTIN) 2 % powder   Topical BID Angelique Noguera MD   Given at 11/22/22 2122      heparin (PORCINE) Infusion 13 Units/kg/hr (11/23/22 0021)       Physical Exam:  BP (!) 161/61   Pulse 61   Temp 98.3 °F (36.8 °C) (Oral)   Resp 18   Wt 172 lb (78 kg)   SpO2 93%   BMI 24.68 kg/m²   Weight change: Wt Readings from Last 3 Encounters:   11/23/22 172 lb (78 kg)   09/13/22 168 lb (76.2 kg)   05/18/22 175 lb (79.4 kg)     The patient is awake, alert and in no discomfort or distress. No gross musculoskeletal deformity is present. No significant skin or nail changes are present. Gross examination of head, eyes, nose and throat are negative. Jugular venous pressure is normal and no carotid bruits are present. Normal respiratory effort is noted with no accessory muscle usage present. Lung fields are clear to ascultation. Cardiac examination is notable for a regular rate and rhythm with no palpable thrill. No gallop rhythm or cardiac murmur are identified. A benign abdominal examination is present with no masses or organomegaly. Intact pulses are present throughout all extremities and no peripheral edema is present. No focal neurologic deficits are present. Intake/Output:    Intake/Output Summary (Last 24 hours) at 11/23/2022 0743  Last data filed at 11/23/2022 0646  Gross per 24 hour   Intake --   Output 900 ml   Net -900 ml     No intake/output data recorded.     Laboratory Tests:  Lab Results   Component Value Date    CREATININE 1.2 11/22/2022    BUN 21 11/22/2022     11/22/2022 K 5.0 11/22/2022    CL 97 (L) 11/22/2022    CO2 25 11/22/2022     No results for input(s): CKTOTAL, CKMB in the last 72 hours.     Invalid input(s): TROPONONI  Lab Results   Component Value Date    BNP 18 10/15/2013     Lab Results   Component Value Date/Time    WBC 13.2 11/22/2022 11:07 AM    RBC 3.95 11/22/2022 11:07 AM    HGB 12.2 11/22/2022 11:07 AM    HCT 38.1 11/22/2022 11:07 AM    MCV 96.5 11/22/2022 11:07 AM    MCH 30.9 11/22/2022 11:07 AM    MCHC 32.0 11/22/2022 11:07 AM    RDW 13.4 11/22/2022 11:07 AM     11/22/2022 11:07 AM    MPV 10.8 11/22/2022 11:07 AM     Recent Labs     11/22/22  1107   ALKPHOS 83   ALT 8   AST 20   PROT 6.3*   BILITOT 0.3   LABALBU 3.7     Lab Results   Component Value Date/Time    MG 1.9 03/30/2016 09:08 AM     Lab Results   Component Value Date/Time    PROTIME 11.0 07/18/2014 01:15 PM    INR 1.0 07/18/2014 01:15 PM     Lab Results   Component Value Date/Time    TSH 2.340 11/22/2022 11:07 AM     No components found for: CHLPL  Lab Results   Component Value Date    TRIG 199 (H) 09/13/2022    TRIG 212 (H) 05/18/2022    TRIG 194 (H) 02/15/2022     Lab Results   Component Value Date    HDL 40 09/13/2022    HDL 43 05/18/2022    HDL 41 02/15/2022     Lab Results   Component Value Date    LDLCALC 98 09/13/2022 [address] 90 05/18/2022 [address] 86 02/15/2022       Cardiac Tests:  ECG: A repeat electrocardiogram reviewed at time of evaluation demonstrates evidence of sinus rhythm with ongoing evolution of an inferior infarct and resolution of inferolateral T wave changes  Telemetry findings reviewed: sinus rhythm, no new tachy/bradyarrhythmias overnight  Last Echocardiogram: An echocardiogram demonstrates evidence of a normal-sized left ventricular chamber with mild concentric left ventricular hypertrophy and regional wall motion abnormalities with hypokinesis of the basal and mid inferior, inferoseptal and inferolateral segments with mild left ventricular systolic dysfunction and estimated ejection fraction of 45% with stage I diastolic dysfunction and borderline left atrial enlargement with aortic leaflet thickening and calcification with mild to moderate aortic insufficiency      ASSESSMENT / PLAN: On a clinical basis, the patient present remains compensated from cardiovascular standpoint and denies report of any cardiovascular symptoms in the face of his inferior infarct with his repeat electrocardiogram continuing to demonstrate evidence of evolution and his echocardiogram demonstrating associated regional wall motion abnormalities with mild left ventricular systolic dysfunction and associated diastolic dysfunction. Presently, medical management will be maintained including the continuation of intravenous heparin over the next 24 hours. Continued aggressive management of hypertension will be necessary to assist cardiac performance and in light of systolic dysfunction persistent hypertension plans of conversion to sacubitril/valsartan to further assist blood pressure regulation with ongoing needs of monitoring as well as that of renal function and electrolytes. Additional discussion will be held with family regarding options for ischemic assessment with present recommendations of a conservative approach based on his debilitation and concomitant comorbidities. As previously outlined, on a long-term basis, smoking cessation will be necessary to reduce risk of further adverse effects on a chronic obstructive lung disease as well as that of atherosclerotic progression. Aggressive risk factor modification of blood pressure and serum lipids will remain essential to reducing risk of future atherosclerotic development.     In conjunction with present management, his condition has been discussed with primary care with the duration of discussion counseling in conjunction with the present encounter exceeding 40 minutes      Note: This report was completed utilizing computer voice recognition software. Every effort has been made to ensure accuracy, however; inadvertent computerized transcription errors may be present. Shon Crimes.  Tata Sorto, [id number] [facility]

## 2022-11-23 NOTE — PLAN OF CARE
Problem: ABCDS Injury Assessment  Goal: Absence of physical injury  Outcome: Progressing     Problem: Safety - Adult  Goal: Free from fall injury  Outcome: Progressing     Problem: Pain  Goal: Verbalizes/displays adequate comfort level or baseline comfort level  Outcome: Progressing     Problem: Discharge Planning  Goal: Discharge to home or other facility with appropriate resources  Outcome: Progressing     Problem: Skin/Tissue Integrity  Goal: Absence of new skin breakdown  Description: 1. Monitor for areas of redness and/or skin breakdown  2. Assess vascular access sites hourly  3. Every 4-6 hours minimum:  Change oxygen saturation probe site  4. Every 4-6 hours:  If on nasal continuous positive airway pressure, respiratory therapy assess nares and determine need for appliance change or resting period.   Outcome: Progressing

## 2022-11-23 NOTE — CARE COORDINATION
Social Work/Discharge Planning:  Social Work consult noted. Met with patient and completed initial assessment. Explained Social Work role and discussed transition of care/discharge planning. Patient lives with his wife Maribell Cintron in a two story house. PTA he uses a quad cane. He denies any history with hhc or snf. Patient PCP is Dr. Renato Ang and pharmacy is [facility]. Patient agreeable to this worker calling his daughter to confirm discharge plan. Provided patient with free 30 day [employer] savings card. Called patient daughter Maribell Cintron (ph: [phone]) and discussed discharge planning. She states her father stays on the first floor. Maribell Cintron her father will need to be able to assist her with mobility. She is agreeable to home with home health care or skilled nursing facility. Informed her will request order for therapy evaluation. Notified her skilled nursing facility list and home health care list to be left in patient room for her to review. Will continue to follow and assist discharge planning.  Electronically signed by LEANN Thompson on 11/23/2022 at 2:41 PM

## 2022-11-24 LAB
ANION GAP SERPL CALCULATED.3IONS-SCNC: 11 MMOL/L (ref 7–16)
APTT: 40.8 SEC (ref 24.5–35.1)
BUN BLDV-MCNC: 24 MG/DL (ref 6–23)
CALCIUM SERPL-MCNC: 8.9 MG/DL (ref 8.6–10.2)
CHLORIDE BLD-SCNC: 99 MMOL/L (ref 98–107)
CO2: 23 MMOL/L (ref 22–29)
CREAT SERPL-MCNC: 1.3 MG/DL (ref 0.7–1.2)
GFR SERPL CREATININE-BSD FRML MDRD: 54 ML/MIN/1.73
GLUCOSE BLD-MCNC: 126 MG/DL (ref 74–99)
POTASSIUM SERPL-SCNC: 4.1 MMOL/L (ref 3.5–5)
SODIUM BLD-SCNC: 133 MMOL/L (ref 132–146)

## 2022-11-24 PROCEDURE — 2060000000 HC ICU INTERMEDIATE R&B

## 2022-11-24 PROCEDURE — 80048 BASIC METABOLIC PNL TOTAL CA: CPT

## 2022-11-24 PROCEDURE — 99233 SBSQ HOSP IP/OBS HIGH 50: CPT | Performed by: INTERNAL MEDICINE

## 2022-11-24 PROCEDURE — 85730 THROMBOPLASTIN TIME PARTIAL: CPT

## 2022-11-24 PROCEDURE — 6370000000 HC RX 637 (ALT 250 FOR IP): Performed by: INTERNAL MEDICINE

## 2022-11-24 PROCEDURE — 6360000002 HC RX W HCPCS: Performed by: STUDENT IN AN ORGANIZED HEALTH CARE EDUCATION/TRAINING PROGRAM

## 2022-11-24 PROCEDURE — 36415 COLL VENOUS BLD VENIPUNCTURE: CPT

## 2022-11-24 RX ADMIN — PETROLATUM: 420 OINTMENT TOPICAL at 08:08

## 2022-11-24 RX ADMIN — SACUBITRIL AND VALSARTAN 1 TABLET: 49; 51 TABLET, FILM COATED ORAL at 08:08

## 2022-11-24 RX ADMIN — Medication 1 CAPSULE: at 08:07

## 2022-11-24 RX ADMIN — DESMOPRESSIN ACETATE 40 MG: 0.2 TABLET ORAL at 20:45

## 2022-11-24 RX ADMIN — CLOPIDOGREL BISULFATE 75 MG: 75 TABLET ORAL at 08:07

## 2022-11-24 RX ADMIN — LEVOTHYROXINE SODIUM 88 MCG: 0.09 TABLET ORAL at 06:41

## 2022-11-24 RX ADMIN — PANTOPRAZOLE SODIUM 40 MG: 40 TABLET, DELAYED RELEASE ORAL at 08:07

## 2022-11-24 RX ADMIN — CETIRIZINE HYDROCHLORIDE 5 MG: 10 TABLET, FILM COATED ORAL at 08:07

## 2022-11-24 RX ADMIN — ASPIRIN 81 MG CHEWABLE TABLET 81 MG: 81 TABLET CHEWABLE at 08:07

## 2022-11-24 RX ADMIN — AMLODIPINE BESYLATE 2.5 MG: 2.5 TABLET ORAL at 08:07

## 2022-11-24 RX ADMIN — FOLIC ACID 1 MG: 1 TABLET ORAL at 08:07

## 2022-11-24 RX ADMIN — HYDROCODONE BITARTRATE AND ACETAMINOPHEN 1 TABLET: 10; 325 TABLET ORAL at 14:47

## 2022-11-24 RX ADMIN — MICONAZOLE NITRATE: 2 POWDER TOPICAL at 20:47

## 2022-11-24 RX ADMIN — Medication 2000 UNITS: at 08:07

## 2022-11-24 RX ADMIN — HYDROCODONE BITARTRATE AND ACETAMINOPHEN 1 TABLET: 10; 325 TABLET ORAL at 08:07

## 2022-11-24 RX ADMIN — METFORMIN HYDROCHLORIDE 500 MG: 500 TABLET ORAL at 08:07

## 2022-11-24 RX ADMIN — HYDROCODONE BITARTRATE AND ACETAMINOPHEN 1 TABLET: 10; 325 TABLET ORAL at 20:45

## 2022-11-24 RX ADMIN — MICONAZOLE NITRATE: 2 POWDER TOPICAL at 08:08

## 2022-11-24 RX ADMIN — METOPROLOL SUCCINATE 25 MG: 25 TABLET, EXTENDED RELEASE ORAL at 08:07

## 2022-11-24 RX ADMIN — PETROLATUM: 420 OINTMENT TOPICAL at 20:47

## 2022-11-24 RX ADMIN — SACUBITRIL AND VALSARTAN 1 TABLET: 49; 51 TABLET, FILM COATED ORAL at 20:45

## 2022-11-24 RX ADMIN — HEPARIN SODIUM 2000 UNITS: 1000 INJECTION INTRAVENOUS; SUBCUTANEOUS at 04:08

## 2022-11-24 RX ADMIN — FERROUS SULFATE TAB 325 MG (65 MG ELEMENTAL FE) 325 MG: 325 (65 FE) TAB at 08:07

## 2022-11-24 ASSESSMENT — PAIN SCALES - GENERAL
PAINLEVEL_OUTOF10: 0
PAINLEVEL_OUTOF10: 7

## 2022-11-24 NOTE — PROGRESS NOTES
INPATIENT CARDIOLOGY FOLLOW-UP    Name: Jam Skaggs    Age: 78 y.o. Date of Admission: 11/22/2022 10:43 AM    Date of Service: 11/24/2022    Chief Complaint: Follow-up for coronary atherosclerosis, non-ST elevation myocardial infarction, hypertension, chronic obstructive lung disease    Interim History: The patient present remains compensated from a cardiovascular standpoint with present symptoms limited discomfort of his right pelvis and femur. He remains hemodynamically stable and tolerant of his existing medical regimen with persistent stage I systolic hypertension and stable renal function electrolytes following optimization of his medical management. Review of Systems: The remainder of a complete multisystem review including consitutional, central nervous, respiratory, circulatory, gastrointestinal, genitourinary, endocrinologic, hematologic, musculoskeletal and psychiatric are negative. Problem List:  Patient Active Problem List   Diagnosis    Vitamin B 12 deficiency    DISH (diffuse idiopathic skeletal hyperostosis)    HTN (hypertension)    Lipids blood increased    Tobacco abuse    Type 2 diabetes mellitus (HCC)    Hypothyroid    Aortic valve sclerosis    [place] (hard of hearing)    Chronic pain syndrome    Prostate cancer ([facility]    Stage 3a chronic kidney disease ([facility] [address]    Type 2 diabetes mellitus with chronic kidney disease    Anemia    Chronic renal disease, stage III ([facility]) [484226]    Non-ST elevation myocardial infarction (NSTEMI) ([facility] [address]    Coronary artery disease involving native coronary artery of native heart without angina pectoris    Pure hypercholesterolemia    Chronic obstructive pulmonary disease (HCC)       Allergies:   Allergies   Allergen Reactions    Penicillins     Sulfa Antibiotics        Current Medications:  Current Facility-Administered Medications   Medication Dose Route Frequency Provider Last Rate Last Admin    sacubitril-valsartan (ENTRESTO) 49-51 MG per tablet 1 tablet 1 tablet Oral BID Adonay Felix MD   1 tablet at 11/23/22 2025    amLODIPine (NORVASC) tablet 2.5 mg  2.5 mg Oral Daily Jennie Pena MD   2.5 mg at 11/23/22 0900    HYDROcodone-acetaminophen (NORCO)  MG per tablet 1 tablet  1 tablet Oral Q6H PRN Jennie Pena MD   1 tablet at 11/23/22 2025    clopidogrel (PLAVIX) tablet 75 mg  75 mg Oral Daily Adonay Felix MD   75 mg at 11/23/22 5446    metoprolol succinate (TOPROL XL) extended release tablet 25 mg  25 mg Oral Daily Adonay Felix MD   25 mg at 11/23/22 0842    atorvastatin (LIPITOR) tablet 40 mg  40 mg Oral Nightly Jennie Pena MD   40 mg at 11/23/22 2025    aspirin chewable tablet 81 mg  81 mg Oral Daily Jennie Pena MD   81 mg at 11/23/22 2260    vitamin D (CHOLECALCIFEROL) tablet 2,000 Units  2,000 Units Oral Daily Jennie Pena MD   2,000 Units at 40/56/54 5780    folic acid (FOLVITE) tablet 1 mg  1 mg Oral Daily Jennie Pena MD   1 mg at 11/23/22 3811    levothyroxine (SYNTHROID) tablet 88 mcg  88 mcg Oral Daily Jennie Pena MD   88 mcg at 11/24/22 0641    cetirizine (ZYRTEC) tablet 5 mg  5 mg Oral Daily Jennie Pena MD   5 mg at 11/23/22 0842    pantoprazole (PROTONIX) tablet 40 mg  40 mg Oral Daily Jennie Pena MD   40 mg at 11/23/22 3301    cyanocobalamin injection 1,000 mcg  1,000 mcg IntraMUSCular Q14 Days Jennie Pena MD   1,000 mcg at 11/22/22 2121    ferrous sulfate (IRON 325) tablet 325 mg  325 mg Oral Daily with breakfast Jennie Pena MD   325 mg at 11/23/22 7471    metFORMIN (GLUCOPHAGE) tablet 500 mg  500 mg Oral Daily with breakfast Jennie Pena MD   500 mg at 11/23/22 0842    lactobacillus (CULTURELLE) capsule 1 capsule  1 capsule Oral Daily Jennie Pena MD   1 capsule at 11/23/22 0842    tiZANidine (ZANAFLEX) tablet 4 mg  4 mg Oral Q8H PRN Jennie Pena MD   4 mg at 11/23/22 2025    diclofenac sodium (VOLTAREN) 1 % gel 4 g  4 g Topical 4x Daily PRN Tracee Denise MD        white petrolatum ointment   Topical BID Tracee Denise MD   Given at 11/23/22 2025    miconazole (MICOTIN) 2 % powder   Topical BID Tracee Denise MD   Given at 11/23/22 2026         Physical Exam:  BP (!) 154/60   Pulse 65   Temp 96.9 °F (36.1 °C) (Temporal)   Resp 18   Ht 5' 10\" (1.778 m)   Wt 172 lb 6.4 oz (78.2 kg)   SpO2 98%   BMI 24.74 kg/m²   Weight change: 4 lb 6.4 oz (1.996 kg)  Wt Readings from Last 3 Encounters:   11/24/22 172 lb 6.4 oz (78.2 kg)   09/13/22 168 lb (76.2 kg)   05/18/22 175 lb (79.4 kg)     The patient is awake, alert and in no discomfort or distress. No gross musculoskeletal deformity is present. No significant skin or nail changes are present. Gross examination of head, eyes, nose and throat are negative. Jugular venous pressure is normal and no carotid bruits are present. Normal respiratory effort is noted with no accessory muscle usage present. Lung fields are clear to ascultation. Cardiac examination is notable for a regular rate and rhythm with no palpable thrill. No gallop rhythm or cardiac murmur are identified. A benign abdominal examination is present with no masses or organomegaly. Intact pulses are present throughout all extremities and no peripheral edema is present. No focal neurologic deficits are present. Intake/Output:    Intake/Output Summary (Last 24 hours) at 11/24/2022 0706  Last data filed at 11/24/2022 6802  Gross per 24 hour   Intake 350 ml   Output 700 ml   Net -350 ml     No intake/output data recorded. Laboratory Tests:  Lab Results   Component Value Date    CREATININE 1.3 (H) 11/24/2022    BUN 24 (H) 11/24/2022     11/24/2022    K 4.1 11/24/2022    CL 99 11/24/2022    CO2 23 11/24/2022     No results for input(s): CKTOTAL, CKMB in the last 72 hours.     Invalid input(s): TROPONONI  Lab Results   Component Value Date    BNP 18 10/15/2013     Lab Results Component Value Date/Time    WBC 13.2 11/22/2022 11:07 AM    RBC 3.95 11/22/2022 11:07 AM    HGB 12.2 11/22/2022 11:07 AM    HCT 38.1 11/22/2022 11:07 AM    MCV 96.5 11/22/2022 11:07 AM    MCH 30.9 11/22/2022 11:07 AM    MCHC 32.0 11/22/2022 11:07 AM    RDW 13.4 11/22/2022 11:07 AM     11/22/2022 11:07 AM    MPV 10.8 11/22/2022 11:07 AM     Recent Labs     11/22/22  1107   ALKPHOS 83   ALT 8   AST 20   PROT 6.3*   BILITOT 0.3   LABALBU 3.7     Lab Results   Component Value Date/Time    MG 1.9 03/30/2016 09:08 AM     Lab Results   Component Value Date/Time    PROTIME 11.0 07/18/2014 01:15 PM    INR 1.0 07/18/2014 01:15 PM     Lab Results   Component Value Date/Time    TSH 2.340 11/22/2022 11:07 AM     No components found for: CHLPL  Lab Results   Component Value Date    TRIG 199 (H) 09/13/2022    TRIG 212 (H) 05/18/2022    TRIG 194 (H) 02/15/2022     Lab Results   Component Value Date    HDL 40 09/13/2022    HDL 43 05/18/2022    HDL 41 02/15/2022     Lab Results   Component Value Date    LDLCALC 98 09/13/2022 [address] 90 05/18/2022 [address] 86 02/15/2022       Cardiac Tests:  Telemetry findings reviewed: sinus rhythm with a transient episode of paroxysmal supraventricular tachycardia, no new tachy/bradyarrhythmias overnight      ASSESSMENT / PLAN: On a clinical basis, the patient presently appears compensated from a cardiovascular standpoint with no active cardiovascular symptoms and tolerance of his existing medical regimen with residual stage I systolic hypertension following optimization of his medical regimen. Presently, intravenous heparin will be discontinued with the initiation of thromboembolic protection and needs of continued careful monitoring of his volume status.   Ongoing medical management will be maintained and has been discussed with family and based on his severe debilitation and comorbidities, following discussion, a conservative approach with no additional ischemic assessment anticipated during the course of present hospitalization. Continued appropriate lifestyle modification inclusive of smoking cessation will be essential to reducing risk of progressive adverse effects in the face of his chronic obstructive lung disease in addition to that of reducing risk of atherosclerotic progression. Continued aggressive risk factor modification of blood pressure and serum lipids will remain essential to reducing risk of future atherosclerotic development. If he remains compensated, a realistic consideration of transfer to subacute rehabilitation following an additional 24 hours of hospitalization would be appropriate from a cardiovascular standpoint and if otherwise compensated from a general medical standpoint. At the time of assessment, the patient's condition has been discussed with his daughter inclusive of present cardiovascular plans during the remainder of hospitalization as well as needs of extensive rehabilitation with the duration of discussion counseling exceeding 35 minutes      Note: This report was completed utilizing computer voice recognition software. Every effort has been made to ensure accuracy, however; inadvertent computerized transcription errors may be present. Shon Crimes.  Tata Sorto, [address] [facility]

## 2022-11-24 NOTE — PROGRESS NOTES
Subjective:    Chief complaint:    Denies chest pain    Objective:    BP (!) 154/60   Pulse 65   Temp 96.9 °F (36.1 °C) (Temporal)   Resp 18   Ht 5' 10\" (1.778 m)   Wt 172 lb 6.4 oz (78.2 kg)   SpO2 98%   BMI 24.74 kg/m²   General : Awake ,alert,no distress. Heart:  RRR, no murmurs, gallops, or rubs. Lungs:  CTA bilaterally, no wheeze, rales or rhonchi  Abd: bowel sounds present, nontender, nondistended, no masses  Extrem:  No clubbing, cyanosis, or edema    CBC:   Lab Results   Component Value Date/Time    WBC 13.2 11/22/2022 11:07 AM    RBC 3.95 11/22/2022 11:07 AM    HGB 12.2 11/22/2022 11:07 AM    HCT 38.1 11/22/2022 11:07 AM    MCV 96.5 11/22/2022 11:07 AM    MCH 30.9 11/22/2022 11:07 AM    MCHC 32.0 11/22/2022 11:07 AM    RDW 13.4 11/22/2022 11:07 AM     11/22/2022 11:07 AM    MPV 10.8 11/22/2022 11:07 AM     BMP:    Lab Results   Component Value Date/Time     11/24/2022 02:14 AM    K 4.1 11/24/2022 02:14 AM    K 5.0 11/22/2022 11:07 AM    CL 99 11/24/2022 02:14 AM    CO2 23 11/24/2022 02:14 AM    BUN 24 11/24/2022 02:14 AM    LABALBU 3.7 11/22/2022 11:07 AM    LABALBU 4.2 09/07/2011 10:06 AM    CREATININE 1.3 11/24/2022 02:14 AM    CALCIUM 8.9 11/24/2022 02:14 AM    GFRAA 50 09/13/2022 10:45 AM    LABGLOM 54 11/24/2022 02:14 AM    GLUCOSE 126 11/24/2022 02:14 AM    GLUCOSE 121 09/07/2011 10:06 AM     PT/INR:    Lab Results   Component Value Date/Time    PROTIME 11.0 07/18/2014 01:15 PM    INR 1.0 07/18/2014 01:15 PM     Troponin:  No results found for: TROPONINI    No results for input(s): LABURIN in the last 72 hours. No results for input(s): BC in the last 72 hours. No results for input(s): Harjinder Dela Cruz in the last 72 hours.       Current Facility-Administered Medications:     sacubitril-valsartan (ENTRESTO) 49-51 MG per tablet 1 tablet, 1 tablet, Oral, BID, Wei Rai MD, 1 tablet at 11/24/22 0808    amLODIPine (NORVASC) tablet 2.5 mg, 2.5 mg, Oral, Daily, Dann Gunter, MD, 2.5 mg at 11/24/22 0807    HYDROcodone-acetaminophen (NORCO)  MG per tablet 1 tablet, 1 tablet, Oral, Q6H PRN, Arelis Suresh MD, 1 tablet at 11/24/22 0807    clopidogrel (PLAVIX) tablet 75 mg, 75 mg, Oral, Daily, Emily Gurrola MD, 75 mg at 11/24/22 7230    metoprolol succinate (TOPROL XL) extended release tablet 25 mg, 25 mg, Oral, Daily, Emily Gurrola MD, 25 mg at 11/24/22 0428    atorvastatin (LIPITOR) tablet 40 mg, 40 mg, Oral, Nightly, Arelis Suresh MD, 40 mg at 11/23/22 2025    aspirin chewable tablet 81 mg, 81 mg, Oral, Daily, Arelis Suresh MD, 81 mg at 11/24/22 0807    vitamin D (CHOLECALCIFEROL) tablet 2,000 Units, 2,000 Units, Oral, Daily, Arelis Suresh MD, 2,000 Units at 41/86/99 4595    folic acid (FOLVITE) tablet 1 mg, 1 mg, Oral, Daily, Arelis Suresh MD, 1 mg at 11/24/22 7508    levothyroxine (SYNTHROID) tablet 88 mcg, 88 mcg, Oral, Daily, Arelis Suresh MD, 88 mcg at 11/24/22 0641    cetirizine (ZYRTEC) tablet 5 mg, 5 mg, Oral, Daily, Arelis Suresh MD, 5 mg at 11/24/22 2274    pantoprazole (PROTONIX) tablet 40 mg, 40 mg, Oral, Daily, Arelis Suresh MD, 40 mg at 11/24/22 9186    cyanocobalamin injection 1,000 mcg, 1,000 mcg, IntraMUSCular, Q14 Days, Arelis Suresh MD, 1,000 mcg at 11/22/22 2121    ferrous sulfate (IRON 325) tablet 325 mg, 325 mg, Oral, Daily with breakfast, Arelis Suresh MD, 325 mg at 11/24/22 4150    metFORMIN (GLUCOPHAGE) tablet 500 mg, 500 mg, Oral, Daily with breakfast, Arelis Suresh MD, 500 mg at 11/24/22 2216    lactobacillus (CULTURELLE) capsule 1 capsule, 1 capsule, Oral, Daily, Arelis Suresh MD, 1 capsule at 11/24/22 0807    tiZANidine (ZANAFLEX) tablet 4 mg, 4 mg, Oral, Q8H PRN, Arelis Suresh MD, 4 mg at 11/23/22 2025    diclofenac sodium (VOLTAREN) 1 % gel 4 g, 4 g, Topical, 4x Daily PRN, Arelis Suresh MD    white petrolatum ointment, , Topical, BID, Jorge Cuenca MD, Given at 11/24/22 5197    miconazole (MICOTIN) 2 % powder, , Topical, BID, Jorge Cuenca MD, Given at 11/24/22 0935    ADULT DIET; Regular; 5 carb choices (75 gm/meal)  ADULT ORAL NUTRITION SUPPLEMENT; Breakfast, Dinner; Diabetic Oral Supplement    XR HIP BILATERAL W AP PELVIS (2 VIEWS)   Final Result   No acute abnormality of the pelvis and bilateral hips. IMPRESSION:   No acute osseous abnormality of the pelvis and bilateral bones. CT HEAD WO CONTRAST   Final Result   No acute intracranial abnormality. No acute osseous abnormality of the cervical spine. Degenerative changes of the cervical spine. 1.7 x 2.1 cm cyst visualized in the occipital subcutaneous soft tissues. CT CERVICAL SPINE WO CONTRAST   Final Result   No acute intracranial abnormality. No acute osseous abnormality of the cervical spine. Degenerative changes of the cervical spine. 1.7 x 2.1 cm cyst visualized in the occipital subcutaneous soft tissues. XR CHEST PORTABLE   Final Result   No acute process. Assessment:    Principal Problem:    Non-ST elevation myocardial infarction (NSTEMI) ([address]  Active Problems:    Coronary artery disease involving native coronary artery of native heart without angina pectoris    Pure hypercholesterolemia    Chronic obstructive pulmonary disease (HCC)  Resolved Problems:    * No resolved hospital problems. *      Plan: Will need JERAMIE on dc  Discussed with charge nurse  Discussed with daughter  Increasing narcotics may lead to more falls in this patient with dementia        Jorge Cuenca MD  10:32 AM  11/24/2022    NOTE: This report was transcribed using voice recognition software.  Every effort was made to ensure accuracy; however, inadvertent transcription errors may be present

## 2022-11-25 ENCOUNTER — APPOINTMENT (OUTPATIENT)
Dept: CT IMAGING | Age: 82
DRG: 282 | End: 2022-11-25
Payer: MEDICARE

## 2022-11-25 LAB
ANION GAP SERPL CALCULATED.3IONS-SCNC: 11 MMOL/L (ref 7–16)
BUN BLDV-MCNC: 27 MG/DL (ref 6–23)
CALCIUM SERPL-MCNC: 8.4 MG/DL (ref 8.6–10.2)
CHLORIDE BLD-SCNC: 100 MMOL/L (ref 98–107)
CO2: 23 MMOL/L (ref 22–29)
CREAT SERPL-MCNC: 1.4 MG/DL (ref 0.7–1.2)
GFR SERPL CREATININE-BSD FRML MDRD: 49 ML/MIN/1.73
GLUCOSE BLD-MCNC: 129 MG/DL (ref 74–99)
POTASSIUM SERPL-SCNC: 4.2 MMOL/L (ref 3.5–5)
SODIUM BLD-SCNC: 134 MMOL/L (ref 132–146)

## 2022-11-25 PROCEDURE — 6370000000 HC RX 637 (ALT 250 FOR IP): Performed by: INTERNAL MEDICINE

## 2022-11-25 PROCEDURE — 97161 PT EVAL LOW COMPLEX 20 MIN: CPT

## 2022-11-25 PROCEDURE — 72131 CT LUMBAR SPINE W/O DYE: CPT

## 2022-11-25 PROCEDURE — 97165 OT EVAL LOW COMPLEX 30 MIN: CPT

## 2022-11-25 PROCEDURE — 2060000000 HC ICU INTERMEDIATE R&B

## 2022-11-25 PROCEDURE — 97535 SELF CARE MNGMENT TRAINING: CPT

## 2022-11-25 PROCEDURE — 99232 SBSQ HOSP IP/OBS MODERATE 35: CPT | Performed by: INTERNAL MEDICINE

## 2022-11-25 PROCEDURE — 97530 THERAPEUTIC ACTIVITIES: CPT

## 2022-11-25 PROCEDURE — 36415 COLL VENOUS BLD VENIPUNCTURE: CPT

## 2022-11-25 PROCEDURE — 80048 BASIC METABOLIC PNL TOTAL CA: CPT

## 2022-11-25 RX ORDER — METOPROLOL SUCCINATE 25 MG/1
37.5 TABLET, EXTENDED RELEASE ORAL DAILY
Status: DISCONTINUED | OUTPATIENT
Start: 2022-11-25 | End: 2022-11-28 | Stop reason: HOSPADM

## 2022-11-25 RX ADMIN — SACUBITRIL AND VALSARTAN 1 TABLET: 49; 51 TABLET, FILM COATED ORAL at 08:34

## 2022-11-25 RX ADMIN — CETIRIZINE HYDROCHLORIDE 5 MG: 10 TABLET, FILM COATED ORAL at 08:33

## 2022-11-25 RX ADMIN — CLOPIDOGREL BISULFATE 75 MG: 75 TABLET ORAL at 08:32

## 2022-11-25 RX ADMIN — METFORMIN HYDROCHLORIDE 500 MG: 500 TABLET ORAL at 08:32

## 2022-11-25 RX ADMIN — HYDROCODONE BITARTRATE AND ACETAMINOPHEN 1 TABLET: 10; 325 TABLET ORAL at 20:54

## 2022-11-25 RX ADMIN — PANTOPRAZOLE SODIUM 40 MG: 40 TABLET, DELAYED RELEASE ORAL at 08:32

## 2022-11-25 RX ADMIN — PETROLATUM: 420 OINTMENT TOPICAL at 08:34

## 2022-11-25 RX ADMIN — MICONAZOLE NITRATE: 2 POWDER TOPICAL at 08:35

## 2022-11-25 RX ADMIN — FOLIC ACID 1 MG: 1 TABLET ORAL at 08:32

## 2022-11-25 RX ADMIN — LEVOTHYROXINE SODIUM 88 MCG: 0.09 TABLET ORAL at 05:45

## 2022-11-25 RX ADMIN — Medication 2000 UNITS: at 08:33

## 2022-11-25 RX ADMIN — SACUBITRIL AND VALSARTAN 1 TABLET: 49; 51 TABLET, FILM COATED ORAL at 20:54

## 2022-11-25 RX ADMIN — ASPIRIN 81 MG CHEWABLE TABLET 81 MG: 81 TABLET CHEWABLE at 08:32

## 2022-11-25 RX ADMIN — FERROUS SULFATE TAB 325 MG (65 MG ELEMENTAL FE) 325 MG: 325 (65 FE) TAB at 08:34

## 2022-11-25 RX ADMIN — METOPROLOL SUCCINATE 37.5 MG: 25 TABLET, EXTENDED RELEASE ORAL at 08:33

## 2022-11-25 RX ADMIN — Medication 1 CAPSULE: at 08:32

## 2022-11-25 RX ADMIN — DESMOPRESSIN ACETATE 40 MG: 0.2 TABLET ORAL at 20:54

## 2022-11-25 ASSESSMENT — PAIN DESCRIPTION - ORIENTATION: ORIENTATION: LOWER

## 2022-11-25 ASSESSMENT — PAIN DESCRIPTION - FREQUENCY: FREQUENCY: CONTINUOUS

## 2022-11-25 ASSESSMENT — PAIN SCALES - GENERAL
PAINLEVEL_OUTOF10: 0
PAINLEVEL_OUTOF10: 7

## 2022-11-25 ASSESSMENT — PAIN DESCRIPTION - ONSET: ONSET: ON-GOING

## 2022-11-25 ASSESSMENT — PAIN - FUNCTIONAL ASSESSMENT: PAIN_FUNCTIONAL_ASSESSMENT: PREVENTS OR INTERFERES SOME ACTIVE ACTIVITIES AND ADLS

## 2022-11-25 ASSESSMENT — PAIN DESCRIPTION - PAIN TYPE: TYPE: CHRONIC PAIN

## 2022-11-25 ASSESSMENT — PAIN DESCRIPTION - LOCATION: LOCATION: BACK

## 2022-11-25 ASSESSMENT — PAIN DESCRIPTION - DESCRIPTORS: DESCRIPTORS: ACHING;DISCOMFORT

## 2022-11-25 NOTE — DISCHARGE INSTR - COC
Continuity of Care Form    Patient Name: Diana Mota   :  1940  MRN:  [id number]    Admit date:  2022  Discharge date:  2022    Code Status Order: Full Code   Advance Directives:     Admitting Physician:  Vitaliy Nelson MD  PCP: Vitaliy Nelson MD    Discharging Nurse:  [address] [place] [address] Unit/Room#: 1069/6897-D  Discharging Unit Phone Number: [phone]    Emergency Contact:   Extended Emergency Contact Information  Primary Emergency Contact: Julianna Garcia  Address: [address] Phone: [phone]  Mobile Phone: [phone]  Relation: Child  Secondary Emergency Contact: Janeen Doll [address] Phone: [phone]  Relation: Child    Past Surgical History:  Past Surgical History:   Procedure Laterality Date    CATARACT REMOVAL WITH IMPLANT Bilateral     Dr. Sean Hall      Dr. Amanda Arriola    COLONOSCOPY N/A 3/18/2022    COLONOSCOPY DIAGNOSTIC performed by Marvin Gamez MD at [facility] ENDOSCOPY    ERCP  2014    13 stones removed     Shadiabill Her of [facility]    Dr. Joelle Austin of cholesteatoma     SINUS SURGERY      repair of nasal septum    UPPER GASTROINTESTINAL ENDOSCOPY N/A 3/18/2022    EGD BIOPSY performed by Marvin Gamez MD at [facility] ENDOSCOPY       Immunization History:   Immunization History   Administered Date(s) Administered    COVID-19, MODERNA BLUE border, Primary or Immunocompromised, (age 12y+), IM, 100 mcg/0.5mL 2021, 2021, 2021, 2021    Influenza Vaccine, unspecified formulation 10/15/2013, 10/09/2014    Influenza, FLUARIX, FLULAVAL, FLUZONE (age 10 mo+) AND AFLURIA, (age 1 y+), PF, 0.5mL 2016    Influenza, FLUZONE (age 72 y+), High Dose, 0.7mL 2020, 10/08/2021    Influenza, High Dose (Fluzone 65 yrs and older) 09/29/2015, 09/13/2017, 12/12/2018, 11/13/2019    Pneumococcal Conjugate 13-valent ([id number]) 08/19/2016    Pneumococcal Polysaccharide ([id number]) 09/13/2017    Tdap (Boostrix, Adacel) 06/02/2017       Active Problems:  Patient Active Problem List   Diagnosis Code    Vitamin B 12 deficiency E53.8    DISH (diffuse idiopathic skeletal hyperostosis) M48.10    HTN (hypertension) I10    Lipids blood increased E78.5    Tobacco abuse Z72.0    Type 2 diabetes mellitus (HCC) E11.9    Hypothyroid E03.9    Aortic valve sclerosis I35.8    [place] (hard of hearing) H91.90    Chronic pain syndrome G89.4    Prostate cancer ([facility] C61    Stage 3a chronic kidney disease ([facility] [address] [facility] N18.31    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    Anemia D64.9    Chronic renal disease, stage III [facility]) [[id number]] N18.30    Non-ST elevation myocardial infarction (NSTEMI) ([facility] [address] [facility] I21.4    Coronary artery disease involving native coronary artery of native heart without angina pectoris I25.10    Pure hypercholesterolemia E78.00    Chronic obstructive pulmonary disease (HCC) J44.9       Isolation/Infection:   Isolation            No Isolation          Patient Infection Status       Infection Onset Added Last Indicated Last Indicated By Review Planned Expiration Resolved Resolved By    None active    Resolved    COVID-19 (Rule Out) 11/22/22 11/22/22 11/22/22 COVID-19, Rapid (Ordered)   11/22/22 Rule-Out Test Resulted            Nurse Assessment:  Last Vital Signs: BP (!) 139/57   Pulse 79   Temp 97.1 °F (36.2 °C) (Infrared)   Resp 18   Ht 5' 10\" (1.778 m)   Wt 172 lb 6.4 oz (78.2 kg)   SpO2 91%   BMI 24.74 kg/m²     Last documented pain score (0-10 scale): Pain Level: 0  Last Weight:   Wt Readings from Last 1 Encounters:   11/24/22 172 lb 6.4 oz (78.2 kg)     Mental Status:  oriented, alert, and disoriented at times    IV Access:  - None    Nursing Mobility/ADLs:  Walking   Assisted  Transfer  Assisted  Bathing Assisted  Dressing  Assisted  Toileting  Assisted  Feeding  Independent  Med Admin  Assisted  Med Delivery   whole    Wound Care Documentation and Therapy:        Elimination:  Continence: Bowel: Yes  Bladder: No  Urinary Catheter: None   Colostomy/Ileostomy/Ileal Conduit: No       Date of Last BM: 11/25/2022 per patient    Intake/Output Summary (Last 24 hours) at 11/25/2022 1503  Last data filed at 11/25/2022 3362  Gross per 24 hour   Intake --   Output 1050 ml   Net -1050 ml     I/O last 3 completed shifts: In: 350 [I.V.:350]  [facility] [Urine:1350[facility]    Safety Concerns: At Risk for Falls    Impairments/Disabilities:      Vision, hearing    Nutrition Therapy:  Current Nutrition Therapy:   - Oral Diet:  Carb Control 5 carbs/meal (2000kcals/day)    Routes of Feeding: Oral  Liquids: No Restrictions  Daily Fluid Restriction: no  Last Modified Barium Swallow with Video (Video Swallowing Test): not done    Treatments at the Time of Hospital Discharge:   Respiratory Treatments: ***  Oxygen Therapy:  is not on home oxygen therapy.   Ventilator:    - No ventilator support    Rehab Therapies: Physical Therapy and Occupational Therapy  Weight Bearing Status/Restrictions: No weight bearing restrictions  Other Medical Equipment (for information only, NOT a DME order):  wheelchair, walker, bedside commode, and hospital bed  Other Treatments:       Patient's personal belongings (please select all that are sent with patient):  Bernardino    RN SIGNATURE:  Electronically signed by Randi Diallo RN on 11/28/22 at 3:03 PM EST    CASE MANAGEMENT/SOCIAL WORK SECTION    Inpatient Status Date: 11/22/2022    Readmission Risk Assessment Score:  Readmission Risk              Risk of Unplanned Readmission:  19           Discharging to Facility/ Agency   Name: [address] [place] [address] Street  Address: [address] [place] [address]  Phone: [phone]  Fax: [phone]    Dialysis Facility (if applicable) Name:  Address:  Dialysis Schedule:  Phone:  Fax:    / signature: Electronically signed by Jennifer Duncan RN on 11/25/2022 at 3:04 PM      PHYSICIAN SECTION    Prognosis: Good    Condition at Discharge: Stable    Rehab Potential (if transferring to Rehab): Good    Recommended Labs or Other Treatments After Discharge: ***    Physician Certification: I certify the above information and transfer of Manfred Angela  is necessary for the continuing treatment of the diagnosis listed and that he requires [facility] for less 30 days.      Update Admission H&P: No change in H&P    PHYSICIAN SIGNATURE:  Electronically signed by Kyle Ramírez MD on 11/28/22 at 2:20 PM EST

## 2022-11-25 NOTE — CARE COORDINATION
PT/OT evals pending. Anticipating need for snf. Met w/ daughter Beto Schroeder in room- she is reviewing JERAMIE lists for choices- CM will follow up later today. No precert required. Will need COVID test on day of discharge. Will need ULISES in epic, Swing by Swing and ambulette transport form once accepted at a facility. Will follow Alix Birmingham RN case manager    The Plan for Transition of Care is related to the following treatment goals: physical rehab    The Patient and/or patient representative Bonita Miranda was provided with a choice of provider and agrees   with the discharge plan. [x] Yes [] No    Freedom of choice list was provided with basic dialogue that supports the patient's individualized plan of care/goals, treatment preferences and shares the quality data associated with the providers. [x] Yes [] No    1300: PT am-pac 13. OT eval pending. [facility]ER[facility] Choices given 1) [facility]- referral made-awaiting acceptance 2) [facility]- referral made-awaiting acceptance 3) [facility] 4) [facility]. No precert required. Will need COVID test on day of discharge. Will need ULISES in epic, Swing by Swing and ambulette transport form once accepted at a facility. Will follow Alix Birmingham RN case manager    3111: Accepted at [address] [facility] pending lumbar spine results. They can accept over the weekend. No precert required. Daughter Beto Schroeder updated [phone]. Will need COVID test on day of discharge. ULISES in epic, Swing by Swing and ambulette transport form on chart.   Alix Birmingham RN case manager

## 2022-11-25 NOTE — PROGRESS NOTES
Subjective:    Chief complaint:    Patient continues to complain of low back pain  Daughter is by the bedside   Objective:    BP (!) 163/58   Pulse 63   Temp 97.7 °F (36.5 °C) (Oral)   Resp 18   Ht 5' 10\" (1.778 m)   Wt 172 lb 6.4 oz (78.2 kg)   SpO2 93%   BMI 24.74 kg/m²   General : Awake ,alert,no distress. Heart:  RRR, no murmurs, gallops, or rubs. Lungs:  CTA bilaterally, no wheeze, rales or rhonchi  Abd: bowel sounds present, nontender, nondistended, no masses  Extrem:  No clubbing, cyanosis, or edema    CBC:   Lab Results   Component Value Date/Time    WBC 13.2 11/22/2022 11:07 AM    RBC 3.95 11/22/2022 11:07 AM    HGB 12.2 11/22/2022 11:07 AM    HCT 38.1 11/22/2022 11:07 AM    MCV 96.5 11/22/2022 11:07 AM    MCH 30.9 11/22/2022 11:07 AM    MCHC 32.0 11/22/2022 11:07 AM    RDW 13.4 11/22/2022 11:07 AM     11/22/2022 11:07 AM    MPV 10.8 11/22/2022 11:07 AM     BMP:    Lab Results   Component Value Date/Time     11/25/2022 02:25 AM    K 4.2 11/25/2022 02:25 AM    K 5.0 11/22/2022 11:07 AM     11/25/2022 02:25 AM    CO2 23 11/25/2022 02:25 AM    BUN 27 11/25/2022 02:25 AM    LABALBU 3.7 11/22/2022 11:07 AM    LABALBU 4.2 09/07/2011 10:06 AM    CREATININE 1.4 11/25/2022 02:25 AM    CALCIUM 8.4 11/25/2022 02:25 AM    GFRAA 50 09/13/2022 10:45 AM    LABGLOM 49 11/25/2022 02:25 AM    GLUCOSE 129 11/25/2022 02:25 AM    GLUCOSE 121 09/07/2011 10:06 AM     PT/INR:    Lab Results   Component Value Date/Time    PROTIME 11.0 07/18/2014 01:15 PM    INR 1.0 07/18/2014 01:15 PM     Troponin:  No results found for: TROPONINI    No results for input(s): LABURIN in the last 72 hours. No results for input(s): BC in the last 72 hours. No results for input(s): Yesy Bauer in the last 72 hours.       Current Facility-Administered Medications:     metoprolol succinate (TOPROL XL) extended release tablet 37.5 mg, 37.5 mg, Oral, Daily, Lydia Rosenberg MD, 37.5 mg at 11/25/22 1977    sacubitril-valsartan (ENTRESTO) 49-51 MG per tablet 1 tablet, 1 tablet, Oral, BID, Priscilla Herrera MD, 1 tablet at 11/25/22 0834    HYDROcodone-acetaminophen (NORCO)  MG per tablet 1 tablet, 1 tablet, Oral, Q6H PRN, Babatunde Garcia MD, 1 tablet at 11/24/22 2045    clopidogrel (PLAVIX) tablet 75 mg, 75 mg, Oral, Daily, Priscilla Herrera MD, 75 mg at 11/25/22 6595    atorvastatin (LIPITOR) tablet 40 mg, 40 mg, Oral, Nightly, Babatunde Garcia MD, 40 mg at 11/24/22 2045    aspirin chewable tablet 81 mg, 81 mg, Oral, Daily, Babatunde Garcia MD, 81 mg at 11/25/22 7962    vitamin D (CHOLECALCIFEROL) tablet 2,000 Units, 2,000 Units, Oral, Daily, Babatunde Garcia MD, 2,000 Units at 39/51/83 4564    folic acid (FOLVITE) tablet 1 mg, 1 mg, Oral, Daily, Babatunde Garcia MD, 1 mg at 11/25/22 5035    levothyroxine (SYNTHROID) tablet 88 mcg, 88 mcg, Oral, Daily, Babatunde Garcia MD, 88 mcg at 11/25/22 0545    cetirizine (ZYRTEC) tablet 5 mg, 5 mg, Oral, Daily, Babatunde Garcia MD, 5 mg at 11/25/22 4375    pantoprazole (PROTONIX) tablet 40 mg, 40 mg, Oral, Daily, Babatunde Garcia MD, 40 mg at 11/25/22 5259    cyanocobalamin injection 1,000 mcg, 1,000 mcg, IntraMUSCular, Q14 Days, Babatunde Garcia MD, 1,000 mcg at 11/22/22 2121    ferrous sulfate (IRON 325) tablet 325 mg, 325 mg, Oral, Daily with breakfast, Babatunde Garcia MD, 325 mg at 11/25/22 4458    metFORMIN (GLUCOPHAGE) tablet 500 mg, 500 mg, Oral, Daily with breakfast, Babatunde Garcia MD, 500 mg at 11/25/22 1976    lactobacillus (CULTURELLE) capsule 1 capsule, 1 capsule, Oral, Daily, Babatunde Garcia MD, 1 capsule at 11/25/22 2411    tiZANidine (ZANAFLEX) tablet 4 mg, 4 mg, Oral, Q8H PRN, Babatunde Garcia MD, 4 mg at 11/23/22 2025    diclofenac sodium (VOLTAREN) 1 % gel 4 g, 4 g, Topical, 4x Daily PRN, Babatunde Garcia MD    white petrolatum ointment, , Topical, BID, Babatunde Garcia MD, Given at 11/25/22 9893 miconazole (MICOTIN) 2 % powder, , Topical, BID, Lois Chau MD, Given at 11/25/22 1113    ADULT DIET; Regular; 5 carb choices (75 gm/meal)  ADULT ORAL NUTRITION SUPPLEMENT; Breakfast, Dinner; Diabetic Oral Supplement    XR HIP BILATERAL W AP PELVIS (2 VIEWS)   Final Result   No acute abnormality of the pelvis and bilateral hips. IMPRESSION:   No acute osseous abnormality of the pelvis and bilateral bones. CT HEAD WO CONTRAST   Final Result   No acute intracranial abnormality. No acute osseous abnormality of the cervical spine. Degenerative changes of the cervical spine. 1.7 x 2.1 cm cyst visualized in the occipital subcutaneous soft tissues. CT CERVICAL SPINE WO CONTRAST   Final Result   No acute intracranial abnormality. No acute osseous abnormality of the cervical spine. Degenerative changes of the cervical spine. 1.7 x 2.1 cm cyst visualized in the occipital subcutaneous soft tissues. XR CHEST PORTABLE   Final Result   No acute process. Assessment:    Principal Problem:    Non-ST elevation myocardial infarction (NSTEMI) ([address]  Active Problems:    Coronary artery disease involving native coronary artery of native heart without angina pectoris    Pure hypercholesterolemia    Chronic obstructive pulmonary disease (HCC)  Resolved Problems:    * No resolved hospital problems. *      Plan:  Lumbar spine CAT scan without contrast given renal insufficiency  Await PT evaluation  Subacute rehab        Lois Chau MD  11:12 AM  11/25/2022    NOTE: This report was transcribed using voice recognition software.  Every effort was made to ensure accuracy; however, inadvertent transcription errors may be present

## 2022-11-25 NOTE — PROGRESS NOTES
Occupational Therapy  OCCUPATIONAL THERAPY INITIAL EVALUATION     Domitila Faith [facility] [place] [facility], [place]        LVSW:                                                  Patient Name: Cayetano Roberts    MRN: [id number]    : 1940    Room: [address] [place] [address]      Evaluating OT: Jeremy Garza, OTR/L [id number]      Referring Provider: Yousif Prieto MD    Specific Provider Orders/Date:OT eval and treat 22      Diagnosis: NSTEMI (non-ST elevated myocardial infarction) [facility]) [I21.4]      Pertinent Medical History: DM, prostate CA, [place], HTN, vertigo        Precautions:  Fall Risk     Assessment of current deficits    [] Functional mobility  [x]ADLs  [x] Strength               [x]Cognition    [x] Functional transfers   [x] IADLs         [x] Safety Awareness   [x]Endurance    [x] Fine Coordination              [x] Balance      [] Vision/perception   [x]Sensation     []Gross Motor Coordination  [] ROM  [] Delirium                   [] Motor Control     OT PLAN OF CARE   OT POC based on physician orders, patient diagnosis and results of clinical assessment    Frequency/Duration 2-4 days/wk for 2 weeks PRN   Specific OT Treatment Interventions to include:   * Instruction/training on adapted ADL techniques and AE recommendations to increase functional independence within precautions       * Training on energy conservation strategies, correct breathing pattern and techniques to improve independence/tolerance for self-care routine  * Functional transfer/mobility training/DME recommendations for increased independence, safety, and fall prevention  * Patient/Family education to increase follow through with safety techniques and functional independence  * Recommendation of environmental modifications for increased safety with functional transfers/mobility and ADLs  * Cognitive retraining/development of therapeutic activities to improve problem solving, judgement, memory, and attention for increased safety/participation in ADL/IADL tasks  * Therapeutic exercise to improve motor endurance, ROM, and functional strength for ADLs/functional transfers  * Therapeutic activities to facilitate/challenge dynamic balance, stand tolerance for increased safety and independence with ADLs  * Positioning to improve skin integrity, interaction with environment and functional independence        Recommended Adaptive Equipment: TBD     Home Living: Pt lives with daughter in 2 story house with ramp . Pt's bedroom and bathroom are on the 1st floor. Bathroom setup: tub/shower with extended tub bench and grab bars, standard height commode   Equipment owned: cane, w/c, BSC, wheeled walker, chair and bed alarms    Prior Level of Function: assist from daughter with ADLs , assist from daughter with IADLs; functional mobility: started using cane recently    Pain Level: pt has chronic lower back pain; pt agreeable to therapy  Cognition: A&O: pt alert and oriented grossly; 2-3 step command follow demonstrated.    Memory:  Fair   Sequencing:  Fair   Problem solving:  Fair   Judgement/safety:  Fair     Functional Assessment:  AM-PAC Daily Activity Raw Score: 16/24   Initial Eval Status  Date: 11/25/22 Treatment Status  Date: STGs = LTGs  Time frame: 10-14 days   Feeding Independent     Grooming Min A, seated  SBA   UB Dressing Min A  To doff/don gown seated  SBA   LB Dressing Max A  To don socks    Min A-with use of AD as appropriate/needed   Bathing Mod A  For LB bathing tasks, seated  SBA -with use of AD as appropriate/needed   Toileting Max A  For pericare completed in standing   Min A    Bed Mobility  Supine to sit: Min A    SBA   Functional Transfers Mod A with wheeled walker  Sit<>stand from EOB  Stand to sit to chair  CGA   Functional Mobility Mod A with wheeled walker  Short distance in room  Unsteadiness noted  CGA -with device as needed to maximize independence with ADLs and functional task completion   Balance Sitting:     Static:  SBA    Dynamic:CGA  Standing: Mod A with wheeled walker  Sup static/dynamic sitting balance to maximize independence with ADLs and functional task completion    SBA for standing balance to maximize independence with ADLs and functional task completion   Activity Tolerance Fair with light activity. Pt limited by weakness and fatigue. Fair+ with ADL activity. Pt will demonstrate good understanding of education provided on EC/WS techniques    Visual/  Perceptual Glasses: yes                Additional long-term goal: Pt will increase functional independence to PLOF to allow pt to live in least restrictive environment. Hand Dominance R   AROM (PROM) Strength Additional Info:    RUE  Shoulder flexion: ~90  Distally WFL Shoulder: 3-/5  Distally: 4-/5 WFL  and wfl FMC/dexterity noted during ADL tasks   LUE Shoulder flexion: ~90  Distally WFL Shoulder: 3-/5  Distally: 4-/5 WFL  and wfl FMC/dexterity noted during ADL tasks     Hearing: [place]  Sensation:  No c/o numbness or tingling   Tone: WFL   Edema: none noted    Comments: Upon arrival patient lying in bed with daughter present. At end of session, patient sitting in chair with call light and phone within reach, all lines and tubes intact, and alarm set and daughter present. Overall patient demonstrated decreased independence and safety during completion of ADL/functional transfer/mobility tasks. Pt would benefit from continued skilled OT to increase safety and independence with completion of ADL/IADL tasks for functional independence and quality of life. Treatment: OT treatment provided this date includes:   Instruction/training on safety and adapted techniques for completion of ADLs: Pt found to be incontinent of urine upon arrival. Gown and pads were saturated. Min A to doff soiled gown and don clean gown seated EOB with cues for technique. Pt was Mod A for LB bathing tasks completed in seated. Almaz Mcguire was completed in standing with Max A. Decreased standing tolerance and balance noted. Instruction/training on safe functional mobility/transfer techniques: Cues for hand placement and safe technique during functional transfers to maximize safety and independence with ADLs and functional tasks. Cues for safe wheeled walker management during functional mobility. Unsteadiness noted. Proper Positioning/Alignment     Rehab Potential: Good for established goals     Patient / Family Goal: to go to rehab facility    Patient and/or family were instructed on functional diagnosis, prognosis/goals and OT plan of care. Demonstrated fair understanding. Eval Complexity: Low    Time In: 0933  Time Out: 0956  Total Treatment Time: 10    Min Units   OT Eval Low 97165  x  1   OT Eval Medium 54607      OT Eval High 50729      OT Re-Eval [id number]       Therapeutic Ex 79810       Therapeutic Activities 74197       ADL/Self Care 32243  10  1   Orthotic Management 93678       Manual 21517     Neuro Re-Ed 77926       Non-Billable Time          Evaluation Time additionally includes thorough review of current medical information, gathering information on past medical history/social history and prior level of function, interpretation of standardized testing/informal observation of tasks, assessment of data and development of plan of care and goals.             Hemal Johnson, OTR/L, [id number]

## 2022-11-25 NOTE — PROGRESS NOTES
Physical Therapy  Facility/Department: [address] INTERMEDIATE 1  Physical Therapy Initial Assessment    Name: Adrienne Mcgill  : 1940  MRN: [id number]  Date of Service: 2022      Patient Diagnosis(es): The encounter diagnosis was NSTEMI (non-ST elevated myocardial infarction) ([facility] Past Medical History:  has a past medical history of Anemia, Aortic valve sclerosis, Cancer ([facility] Diabetes mellitus ([facility] DISH (diffuse idiopathic skeletal hyperostosis), [place] (hard of hearing), HTN (hypertension), Hyperlipidemia, Hypertension, Hypothyroid, Thyroid disease, Tobacco abuse, Vertigo, Vitamin B 12 deficiency, and Vitamin D deficiency. Past Surgical History:  has a past surgical history that includes sinus surgery (); ERCP (2014); Middle ear surgery (); Middle ear surgery (); Cataract removal with implant (Bilateral, ); Colonoscopy (); Colonoscopy (); Upper gastrointestinal endoscopy (N/A, 3/18/2022); and Colonoscopy (N/A, 3/18/2022). Evaluating Therapist: Trent Kyle PT    Room #:  0326/0709-L  Diagnosis:  NSTEMI (non-ST elevated myocardial infarction) ([facility] [I21.4]  PMHx/PSHx:  CA, DM, chronic back pain  Precautions:  falls, alarm, hard of hearing      Social:  Pt lives with daughter in a 1 floor plan with ramped entrance. .  Prior to admission: Pt has quad cane, wheelchair and ww. Recent use of cane. Would not use ww at home. Daughter reports decline in mobility over past week and a half     Initial Evaluation  Date: 22 Treatment      Short Term/ Long Term   Goals   Was pt agreeable to Eval/treatment? yes     Does pt have pain?  Back pain (chronic)     Bed Mobility  Rolling: min assist  Supine to sit: min assist  Sit to supine: NT  Scooting: min assist  SBA   Transfers Sit to stand: mod assist  Stand to sit: mod assist  Stand pivot: mod assist  CGA   Ambulation    3 feet with ww with  mod assist  50 feet with ww with CGA   Stair Negotiation  Ascended and descended  NT N/A   LE strength     R LE 3/5 L LE 4-/5    4/5   balance      Fair-     AM-PAC Raw score               13/24         Pt is alert and Oriented   LE ROM: WFL  Sensation: intact  Edema: none  Endurance: fair  Chair alarm: yes     ASSESSMENT:    Pt displays functional ability as noted in the objective portion of this evaluation. Patient education  Pt educated on transfer technique    Patient response to education:   Pt verbalized understanding Pt demonstrated skill Pt requires further education in this area   yes  With assistance  yes       Comments:  Pt assisted to sit up to edge of bed. No c/o dizziness. Instructed on transfer technique. Pt with flexed knees and hips standing at ww. Pt ambulated bed to chair, knees flexed high risk for falls. Pt's/ family goals   1. To get stronger    Conditions Requiring Skilled Therapeutic Intervention:    [x]Decreased strength     []Decreased ROM  [x]Decreased functional mobility  [x]Decreased balance   [x]Decreased endurance   []Decreased posture  []Decreased sensation  []Decreased coordination   []Decreased vision  [x]Decreased safety awareness   []Increased pain       Patient and or family understand(s) diagnosis, prognosis, and plan of care.     Prognosis is good for reaching above PT goals    PHYSICAL THERAPY PLAN OF CARE:    PT POC is established based on physician order and patient diagnosis     Referring provider/PT Order: Arelis Suresh MD/ PT eval and treat      Current Treatment Recommendations:     [x] Strengthening to improve independence with functional mobility   [] ROM to improve independence with functional mobility   [x] Balance Training to improve static/dynamic balance and to reduce fall risk  [x] Endurance Training to improve activity tolerance during functional mobility   [x] Transfer Training to improve safety and independence with all functional transfers   [x] Gait Training to improve gait mechanics, endurance and assess need for appropriate assistive device  [] Stair Training in preparation for safe discharge home and/or into the community   [] Positioning to prevent skin breakdown and contractures  [x] Safety and Education Training   [x] Patient/Caregiver Education   [] HEP  [] Other     PT long term treatment goals are located in above grid    Frequency of treatments: 2-5x/week x 5 days. Time in  0931  Time out  0854    Total Treatment Time  10 minutes     Evaluation Time includes thorough review of current medical information, gathering information on past medical history/social history and prior level of function, completion of standardized testing/informal observation of tasks, assessment of data and education on plan of care and goals.       CPT codes:  [x] Low Complexity PT evaluation 89473  [] Moderate Complexity PT evaluation 02477  [] High Complexity PT evaluation 57985  [] PT Re-evaluation 53049  [] Gait training 54064 minutes  [] Manual therapy 08198 minutes  [x] Therapeutic activities 03795 10 minutes  [] Therapeutic exercises 48610 minutes  [] Neuromuscular reeducation 93423 minutes     [facility] PSYCHIATRY PT [id number]

## 2022-11-25 NOTE — PROGRESS NOTES
INPATIENT CARDIOLOGY FOLLOW-UP    Name: Sidra Lara    Age: 78 y.o. Date of Admission: 11/22/2022 10:43 AM    Date of Service: 11/25/2022    Chief Complaint: Follow-up for coronary atherosclerosis, non-ST elevation myocardial infarction, hypertension, chronic obstructive lung disease, chronic kidney disease    Interim History: The patient presently remains compensated from a cardiovascular standpoint and tolerant of his existing medical regimen without difficulty and presently essentially stable renal function. Persistent stage I systolic hypertension is noted with no additional supraventricular tachyarrhythmias present. Review of Systems: The remainder of a complete multisystem review including consitutional, central nervous, respiratory, circulatory, gastrointestinal, genitourinary, endocrinologic, hematologic, musculoskeletal and psychiatric are negative. Problem List:  Patient Active Problem List   Diagnosis    Vitamin B 12 deficiency    DISH (diffuse idiopathic skeletal hyperostosis)    HTN (hypertension)    Lipids blood increased    Tobacco abuse    Type 2 diabetes mellitus (HCC)    Hypothyroid    Aortic valve sclerosis    [place] (hard of hearing)    Chronic pain syndrome    Prostate cancer ([facility]    Stage 3a chronic kidney disease ([facility]    Type 2 diabetes mellitus with chronic kidney disease    Anemia    Chronic renal disease, stage III (HCC) [801053]    Non-ST elevation myocardial infarction (NSTEMI) ([facility]    Coronary artery disease involving native coronary artery of native heart without angina pectoris    Pure hypercholesterolemia    Chronic obstructive pulmonary disease (HCC)       Allergies:   Allergies   Allergen Reactions    Penicillins     Sulfa Antibiotics        Current Medications:  Current Facility-Administered Medications   Medication Dose Route Frequency Provider Last Rate Last Admin    sacubitril-valsartan (ENTRESTO) 49-51 MG per tablet 1 tablet  1 tablet Oral BID Елена Wright Charlee London MD   1 tablet at 11/24/22 2045    amLODIPine (NORVASC) tablet 2.5 mg  2.5 mg Oral Daily Mary Engel MD   2.5 mg at 11/24/22 0807    HYDROcodone-acetaminophen (NORCO)  MG per tablet 1 tablet  1 tablet Oral Q6H PRN Mary Engel MD   1 tablet at 11/24/22 2045    clopidogrel (PLAVIX) tablet 75 mg  75 mg Oral Daily Elisha Cook MD   75 mg at 11/24/22 9440    metoprolol succinate (TOPROL XL) extended release tablet 25 mg  25 mg Oral Daily Elisha Cook MD   25 mg at 11/24/22 0807    atorvastatin (LIPITOR) tablet 40 mg  40 mg Oral Nightly Mary Engel MD   40 mg at 11/24/22 2045    aspirin chewable tablet 81 mg  81 mg Oral Daily Mary Engel MD   81 mg at 11/24/22 9806    vitamin D (CHOLECALCIFEROL) tablet 2,000 Units  2,000 Units Oral Daily Mary Engel MD   2,000 Units at 59/36/54 4386    folic acid (FOLVITE) tablet 1 mg  1 mg Oral Daily Mary Engel MD   1 mg at 11/24/22 1496    levothyroxine (SYNTHROID) tablet 88 mcg  88 mcg Oral Daily Mary Engel MD   88 mcg at 11/25/22 0545    cetirizine (ZYRTEC) tablet 5 mg  5 mg Oral Daily Mary Engel MD   5 mg at 11/24/22 0807    pantoprazole (PROTONIX) tablet 40 mg  40 mg Oral Daily Mary Engel MD   40 mg at 11/24/22 8197    cyanocobalamin injection 1,000 mcg  1,000 mcg IntraMUSCular Q14 Days Mary Engel MD   1,000 mcg at 11/22/22 2121    ferrous sulfate (IRON 325) tablet 325 mg  325 mg Oral Daily with breakfast Mary Engel MD   325 mg at 11/24/22 7422    metFORMIN (GLUCOPHAGE) tablet 500 mg  500 mg Oral Daily with breakfast Mary Engel MD   500 mg at 11/24/22 0807    lactobacillus (CULTURELLE) capsule 1 capsule  1 capsule Oral Daily Mary Engel MD   1 capsule at 11/24/22 0807    tiZANidine (ZANAFLEX) tablet 4 mg  4 mg Oral Q8H PRN Mary Engel MD   4 mg at 11/23/22 2025    diclofenac sodium (VOLTAREN) 1 % gel 4 g  4 g Topical 4x Daily PRN Christine Queen MD        white petrolatum ointment   Topical BID Christine Queen MD   Given at 11/24/22 2047    miconazole (MICOTIN) 2 % powder   Topical BID Christine Queen MD   Given at 11/24/22 2047         Physical Exam:  BP (!) 156/60   Pulse 70   Temp 98.2 °F (36.8 °C) (Oral)   Resp 18   Ht 5' 10\" (1.778 m)   Wt 172 lb 6.4 oz (78.2 kg)   SpO2 94%   BMI 24.74 kg/m²   Weight change: Wt Readings from Last 3 Encounters:   11/24/22 172 lb 6.4 oz (78.2 kg)   09/13/22 168 lb (76.2 kg)   05/18/22 175 lb (79.4 kg)     The patient is awake, alert and in no discomfort or distress. No gross musculoskeletal deformity is present. No significant skin or nail changes are present. Gross examination of head, eyes, nose and throat are negative. Jugular venous pressure is normal and no carotid bruits are present. Normal respiratory effort is noted with no accessory muscle usage present. Lung fields are clear to ascultation. Cardiac examination is notable for a regular rate and rhythm with no palpable thrill. No gallop rhythm or cardiac murmur are identified. A benign abdominal examination is present with no masses or organomegaly. Intact pulses are present throughout all extremities and no peripheral edema is present. No focal neurologic deficits are present. Intake/Output:    Intake/Output Summary (Last 24 hours) at 11/25/2022 0653  Last data filed at 11/25/2022 4027  Gross per 24 hour   Intake --   Output 1050 ml   Net -1050 ml     I/O this shift:  In: -   Out: 350 [Urine:350]    Laboratory Tests:  Lab Results   Component Value Date    CREATININE 1.4 (H) 11/25/2022    BUN 27 (H) 11/25/2022     11/25/2022    K 4.2 11/25/2022     11/25/2022    CO2 23 11/25/2022     No results for input(s): CKTOTAL, CKMB in the last 72 hours.     Invalid input(s): TROPONONI  Lab Results   Component Value Date    BNP 18 10/15/2013     Lab Results   Component Value Date/Time    WBC 13.2 11/22/2022 11:07 AM    RBC 3.95 11/22/2022 11:07 AM    HGB 12.2 11/22/2022 11:07 AM    HCT 38.1 11/22/2022 11:07 AM    MCV 96.5 11/22/2022 11:07 AM    MCH 30.9 11/22/2022 11:07 AM    MCHC 32.0 11/22/2022 11:07 AM    RDW 13.4 11/22/2022 11:07 AM     11/22/2022 11:07 AM    MPV 10.8 11/22/2022 11:07 AM     Recent Labs     11/22/22  1107   ALKPHOS 83   ALT 8   AST 20   PROT 6.3*   BILITOT 0.3   LABALBU 3.7     Lab Results   Component Value Date/Time    MG 1.9 03/30/2016 09:08 AM     Lab Results   Component Value Date/Time    PROTIME 11.0 07/18/2014 01:15 PM    INR 1.0 07/18/2014 01:15 PM     Lab Results   Component Value Date/Time    TSH 2.340 11/22/2022 11:07 AM     No components found for: CHLPL  Lab Results   Component Value Date    TRIG 199 (H) 09/13/2022    TRIG 212 (H) 05/18/2022    TRIG 194 (H) 02/15/2022     Lab Results   Component Value Date    HDL 40 09/13/2022    HDL 43 05/18/2022    HDL 41 02/15/2022     Lab Results   Component Value Date    LDLCALC 98 09/13/2022 [address] 90 05/18/2022 [address] 86 02/15/2022       Cardiac Tests:  Telemetry findings reviewed: sinus rhythm, no new tachy/bradyarrhythmias overnight      ASSESSMENT / PLAN: On a clinical basis, the patient remains compensated from cardiovascular standpoint with no additional complaints in the face of his recent inferior infarct and ischemic cardiomyopathy. Persistent systolic hypertension is noted with plans of modification of his beta-blocker dosage addition in the face of his earlier paroxysmal supraventricular tachycardia with plans of continuation of medical management as otherwise outlined. Ongoing lifestyle modification inclusive of smoking cessation will remain essential to reducing risk of further adverse effects on his chronic obstructive lung disease in addition to that of his atherosclerotic risk.   Ongoing aggressive risk factor modification of blood pressure and serum lipids will remain essential to reducing risk of future atherosclerotic development. He is presently adequately compensated to undergo transfer to subacute rehabilitation when appropriate arrangements have been made with further care during hospitalization deferred to primary care. We will further evaluate him should additional cardiovascular difficulties or concerns arise with arrangements made for outpatient cardiovascular follow-up following discharge. Note: This report was completed utilizing computer voice recognition software. Every effort has been made to ensure accuracy, however; inadvertent computerized transcription errors may be present. Jenise Butler.  Aspen Gurrola, [facility] Cardiology

## 2022-11-26 ENCOUNTER — APPOINTMENT (OUTPATIENT)
Dept: MRI IMAGING | Age: 82
DRG: 282 | End: 2022-11-26
Payer: MEDICARE

## 2022-11-26 LAB
ANION GAP SERPL CALCULATED.3IONS-SCNC: 10 MMOL/L (ref 7–16)
BUN BLDV-MCNC: 31 MG/DL (ref 6–23)
CALCIUM SERPL-MCNC: 8.6 MG/DL (ref 8.6–10.2)
CHLORIDE BLD-SCNC: 103 MMOL/L (ref 98–107)
CO2: 21 MMOL/L (ref 22–29)
CREAT SERPL-MCNC: 1.3 MG/DL (ref 0.7–1.2)
GFR SERPL CREATININE-BSD FRML MDRD: 54 ML/MIN/1.73
GLUCOSE BLD-MCNC: 145 MG/DL (ref 74–99)
POTASSIUM SERPL-SCNC: 4.4 MMOL/L (ref 3.5–5)
PROSTATE SPECIFIC ANTIGEN: 0.59 NG/ML (ref 0–4)
SODIUM BLD-SCNC: 134 MMOL/L (ref 132–146)

## 2022-11-26 PROCEDURE — 72148 MRI LUMBAR SPINE W/O DYE: CPT

## 2022-11-26 PROCEDURE — 6370000000 HC RX 637 (ALT 250 FOR IP): Performed by: INTERNAL MEDICINE

## 2022-11-26 PROCEDURE — 2060000000 HC ICU INTERMEDIATE R&B

## 2022-11-26 PROCEDURE — 80048 BASIC METABOLIC PNL TOTAL CA: CPT

## 2022-11-26 PROCEDURE — 36415 COLL VENOUS BLD VENIPUNCTURE: CPT

## 2022-11-26 PROCEDURE — 6360000002 HC RX W HCPCS: Performed by: INTERNAL MEDICINE

## 2022-11-26 PROCEDURE — 84153 ASSAY OF PSA TOTAL: CPT

## 2022-11-26 RX ORDER — LORAZEPAM 2 MG/ML
0.5 INJECTION INTRAMUSCULAR ONCE
Status: COMPLETED | OUTPATIENT
Start: 2022-11-26 | End: 2022-11-26

## 2022-11-26 RX ADMIN — PANTOPRAZOLE SODIUM 40 MG: 40 TABLET, DELAYED RELEASE ORAL at 08:40

## 2022-11-26 RX ADMIN — CLOPIDOGREL BISULFATE 75 MG: 75 TABLET ORAL at 08:41

## 2022-11-26 RX ADMIN — CETIRIZINE HYDROCHLORIDE 5 MG: 10 TABLET, FILM COATED ORAL at 08:40

## 2022-11-26 RX ADMIN — MICONAZOLE NITRATE: 2 POWDER TOPICAL at 08:41

## 2022-11-26 RX ADMIN — HYDROCODONE BITARTRATE AND ACETAMINOPHEN 1 TABLET: 10; 325 TABLET ORAL at 22:12

## 2022-11-26 RX ADMIN — METOPROLOL SUCCINATE 37.5 MG: 25 TABLET, EXTENDED RELEASE ORAL at 08:40

## 2022-11-26 RX ADMIN — LEVOTHYROXINE SODIUM 88 MCG: 0.09 TABLET ORAL at 08:40

## 2022-11-26 RX ADMIN — METFORMIN HYDROCHLORIDE 500 MG: 500 TABLET ORAL at 08:40

## 2022-11-26 RX ADMIN — ASPIRIN 81 MG CHEWABLE TABLET 81 MG: 81 TABLET CHEWABLE at 08:40

## 2022-11-26 RX ADMIN — HYDROCODONE BITARTRATE AND ACETAMINOPHEN 1 TABLET: 10; 325 TABLET ORAL at 08:39

## 2022-11-26 RX ADMIN — LORAZEPAM 0.5 MG: 2 INJECTION INTRAMUSCULAR; INTRAVENOUS at 10:01

## 2022-11-26 RX ADMIN — Medication 1 CAPSULE: at 08:40

## 2022-11-26 RX ADMIN — MICONAZOLE NITRATE: 2 POWDER TOPICAL at 20:46

## 2022-11-26 RX ADMIN — Medication 2000 UNITS: at 08:40

## 2022-11-26 RX ADMIN — FERROUS SULFATE TAB 325 MG (65 MG ELEMENTAL FE) 325 MG: 325 (65 FE) TAB at 08:40

## 2022-11-26 RX ADMIN — PETROLATUM: 420 OINTMENT TOPICAL at 20:46

## 2022-11-26 RX ADMIN — SACUBITRIL AND VALSARTAN 1 TABLET: 49; 51 TABLET, FILM COATED ORAL at 08:41

## 2022-11-26 RX ADMIN — DESMOPRESSIN ACETATE 40 MG: 0.2 TABLET ORAL at 20:45

## 2022-11-26 RX ADMIN — SACUBITRIL AND VALSARTAN 1 TABLET: 49; 51 TABLET, FILM COATED ORAL at 20:45

## 2022-11-26 RX ADMIN — FOLIC ACID 1 MG: 1 TABLET ORAL at 08:40

## 2022-11-26 RX ADMIN — PETROLATUM: 420 OINTMENT TOPICAL at 08:41

## 2022-11-26 ASSESSMENT — PAIN SCALES - GENERAL
PAINLEVEL_OUTOF10: 0
PAINLEVEL_OUTOF10: 6
PAINLEVEL_OUTOF10: 0

## 2022-11-26 NOTE — PROGRESS NOTES
Called and spoke with Dr Alban Mason regarding mri and something to relax patient. New orders received.

## 2022-11-26 NOTE — PROGRESS NOTES
Hospital Medicine    Subjective:  pt alert conversive c/o chronic back pain      Current Facility-Administered Medications:     metoprolol succinate (TOPROL XL) extended release tablet 37.5 mg, 37.5 mg, Oral, Daily, Katja Armas MD, 37.5 mg at 11/25/22 0833    sacubitril-valsartan (ENTRESTO) 49-51 MG per tablet 1 tablet, 1 tablet, Oral, BID, Katja Armas MD, 1 tablet at 11/25/22 2054    HYDROcodone-acetaminophen (Laura Makos)  MG per tablet 1 tablet, 1 tablet, Oral, Q6H PRN, Opal Fortune MD, 1 tablet at 11/25/22 2054    clopidogrel (PLAVIX) tablet 75 mg, 75 mg, Oral, Daily, Katja Armas MD, 75 mg at 11/25/22 7700    atorvastatin (LIPITOR) tablet 40 mg, 40 mg, Oral, Nightly, Opal Fortune MD, 40 mg at 11/25/22 2054    aspirin chewable tablet 81 mg, 81 mg, Oral, Daily, Opal Fortune MD, 81 mg at 11/25/22 4030    vitamin D (CHOLECALCIFEROL) tablet 2,000 Units, 2,000 Units, Oral, Daily, Opal Fortune MD, 2,000 Units at 40/27/48 6802    folic acid (FOLVITE) tablet 1 mg, 1 mg, Oral, Daily, Opal Fortune MD, 1 mg at 11/25/22 6353    levothyroxine (SYNTHROID) tablet 88 mcg, 88 mcg, Oral, Daily, Opal Fortune MD, 88 mcg at 11/25/22 0545    cetirizine (ZYRTEC) tablet 5 mg, 5 mg, Oral, Daily, Opal Fortune MD, 5 mg at 11/25/22 1898    pantoprazole (PROTONIX) tablet 40 mg, 40 mg, Oral, Daily, Opal Fortune MD, 40 mg at 11/25/22 7439    cyanocobalamin injection 1,000 mcg, 1,000 mcg, IntraMUSCular, Q14 Days, Opal Fortune MD, 1,000 mcg at 11/22/22 2121    ferrous sulfate (IRON 325) tablet 325 mg, 325 mg, Oral, Daily with breakfast, Opal Fortune MD, 325 mg at 11/25/22 4423    metFORMIN (GLUCOPHAGE) tablet 500 mg, 500 mg, Oral, Daily with breakfast, Opal Fortune MD, 500 mg at 11/25/22 2580    lactobacillus (CULTURELLE) capsule 1 capsule, 1 capsule, Oral, Daily, Opal Fortune MD, 1 capsule at 11/25/22 0832    tiZANidine (ZANAFLEX) tablet 4 mg, 4 mg, Oral, Q8H PRN, Norma Gregorio MD, 4 mg at 11/23/22 2025    diclofenac sodium (VOLTAREN) 1 % gel 4 g, 4 g, Topical, 4x Daily PRN, Norma Gregorio MD    white petrolatum ointment, , Topical, BID, Noram Gregorio MD, Given at 11/25/22 0834    miconazole (MICOTIN) 2 % powder, , Topical, BID, Norma Gregorio MD, Given at 11/25/22 0835    Objective:    BP (!) 157/54   Pulse 69   Temp 97.9 °F (36.6 °C) (Oral)   Resp 16   Ht 5' 10\" (1.778 m)   Wt 169 lb 14.4 oz (77.1 kg)   SpO2 94%   BMI 24.38 kg/m²     Heart:  reg  Lungs:  ctab  Abd: + bs soft nontender  Extrem:  w/o edema    CBC with Differential:    Lab Results   Component Value Date/Time    WBC 13.2 11/22/2022 11:07 AM    RBC 3.95 11/22/2022 11:07 AM    HGB 12.2 11/22/2022 11:07 AM    HCT 38.1 11/22/2022 11:07 AM     11/22/2022 11:07 AM    MCV 96.5 11/22/2022 11:07 AM    MCH 30.9 11/22/2022 11:07 AM    MCHC 32.0 11/22/2022 11:07 AM    RDW 13.4 11/22/2022 11:07 AM    SEGSPCT 72 10/15/2013 09:41 AM    LYMPHOPCT 8.9 11/22/2022 11:07 AM    MONOPCT 7.3 11/22/2022 11:07 AM    BASOPCT 0.3 11/22/2022 11:07 AM    MONOSABS 0.96 11/22/2022 11:07 AM    LYMPHSABS 1.18 11/22/2022 11:07 AM    EOSABS 0.18 11/22/2022 11:07 AM    BASOSABS 0.04 11/22/2022 11:07 AM     CMP:    Lab Results   Component Value Date/Time     11/26/2022 02:00 AM    K 4.4 11/26/2022 02:00 AM    K 5.0 11/22/2022 11:07 AM     11/26/2022 02:00 AM    CO2 21 11/26/2022 02:00 AM    BUN 31 11/26/2022 02:00 AM    CREATININE 1.3 11/26/2022 02:00 AM    GFRAA 50 09/13/2022 10:45 AM    LABGLOM 54 11/26/2022 02:00 AM    GLUCOSE 145 11/26/2022 02:00 AM    GLUCOSE 121 09/07/2011 10:06 AM    PROT 6.3 11/22/2022 11:07 AM    LABALBU 3.7 11/22/2022 11:07 AM    LABALBU 4.2 09/07/2011 10:06 AM    CALCIUM 8.6 11/26/2022 02:00 AM    BILITOT 0.3 11/22/2022 11:07 AM    ALKPHOS 83 11/22/2022 11:07 AM    AST 20 11/22/2022 11:07 AM    ALT 8 11/22/2022 11:07 AM Warfarin PT/INR:    Lab Results   Component Value Date    INR 1.0 07/18/2014    INR 1.0 07/04/2014    PROTIME 11.0 07/18/2014    PROTIME 10.7 07/04/2014     Ct l spine lytic lesions    Assessment:    Principal Problem:    Non-ST elevation myocardial infarction (NSTEMI) ([facility] [address]  Active Problems:    Coronary artery disease involving native coronary artery of native heart without angina pectoris    Pure hypercholesterolemia    Chronic obstructive pulmonary disease (HCC)  Resolved Problems:    * No resolved hospital problems.  *  Chronic back pain  Abnl ct l spine    Plan:  Mri l spine / psa        Sae Dykes DO  7:11 AM  11/26/2022

## 2022-11-27 PROCEDURE — 2060000000 HC ICU INTERMEDIATE R&B

## 2022-11-27 PROCEDURE — 6370000000 HC RX 637 (ALT 250 FOR IP): Performed by: INTERNAL MEDICINE

## 2022-11-27 RX ADMIN — MICONAZOLE NITRATE: 2 POWDER TOPICAL at 21:33

## 2022-11-27 RX ADMIN — PETROLATUM: 420 OINTMENT TOPICAL at 08:52

## 2022-11-27 RX ADMIN — MICONAZOLE NITRATE: 2 POWDER TOPICAL at 08:52

## 2022-11-27 RX ADMIN — PANTOPRAZOLE SODIUM 40 MG: 40 TABLET, DELAYED RELEASE ORAL at 08:50

## 2022-11-27 RX ADMIN — TIZANIDINE 4 MG: 4 TABLET ORAL at 10:06

## 2022-11-27 RX ADMIN — SACUBITRIL AND VALSARTAN 1 TABLET: 49; 51 TABLET, FILM COATED ORAL at 08:51

## 2022-11-27 RX ADMIN — BISACODYL 10 MG: 5 TABLET, COATED ORAL at 10:06

## 2022-11-27 RX ADMIN — METFORMIN HYDROCHLORIDE 500 MG: 500 TABLET ORAL at 08:51

## 2022-11-27 RX ADMIN — HYDROCODONE BITARTRATE AND ACETAMINOPHEN 1 TABLET: 10; 325 TABLET ORAL at 08:50

## 2022-11-27 RX ADMIN — DESMOPRESSIN ACETATE 40 MG: 0.2 TABLET ORAL at 21:32

## 2022-11-27 RX ADMIN — FOLIC ACID 1 MG: 1 TABLET ORAL at 08:51

## 2022-11-27 RX ADMIN — SACUBITRIL AND VALSARTAN 1 TABLET: 49; 51 TABLET, FILM COATED ORAL at 21:32

## 2022-11-27 RX ADMIN — CETIRIZINE HYDROCHLORIDE 5 MG: 10 TABLET, FILM COATED ORAL at 08:51

## 2022-11-27 RX ADMIN — CLOPIDOGREL BISULFATE 75 MG: 75 TABLET ORAL at 08:51

## 2022-11-27 RX ADMIN — LEVOTHYROXINE SODIUM 88 MCG: 0.09 TABLET ORAL at 06:17

## 2022-11-27 RX ADMIN — Medication 2000 UNITS: at 08:51

## 2022-11-27 RX ADMIN — PETROLATUM: 420 OINTMENT TOPICAL at 21:33

## 2022-11-27 RX ADMIN — ASPIRIN 81 MG CHEWABLE TABLET 81 MG: 81 TABLET CHEWABLE at 08:50

## 2022-11-27 RX ADMIN — HYDROCODONE BITARTRATE AND ACETAMINOPHEN 1 TABLET: 10; 325 TABLET ORAL at 16:03

## 2022-11-27 RX ADMIN — METOPROLOL SUCCINATE 37.5 MG: 25 TABLET, EXTENDED RELEASE ORAL at 08:51

## 2022-11-27 RX ADMIN — FERROUS SULFATE TAB 325 MG (65 MG ELEMENTAL FE) 325 MG: 325 (65 FE) TAB at 08:51

## 2022-11-27 RX ADMIN — TIZANIDINE 4 MG: 4 TABLET ORAL at 21:32

## 2022-11-27 RX ADMIN — HYDROCODONE BITARTRATE AND ACETAMINOPHEN 1 TABLET: 10; 325 TABLET ORAL at 21:32

## 2022-11-27 RX ADMIN — Medication 1 CAPSULE: at 08:51

## 2022-11-27 ASSESSMENT — PAIN SCALES - GENERAL
PAINLEVEL_OUTOF10: 0

## 2022-11-27 NOTE — PROGRESS NOTES
Hospital Medicine    Subjective:  pt alert conversive daughter at bedside pt c/o no bm for few days      Current Facility-Administered Medications:     bisacodyl (DULCOLAX) EC tablet 10 mg, 10 mg, Oral, Once, Jordan Canales DO    metoprolol succinate (TOPROL XL) extended release tablet 37.5 mg, 37.5 mg, Oral, Daily, Hailey Jennings MD, 37.5 mg at 11/27/22 0851    sacubitril-valsartan (ENTRESTO) 49-51 MG per tablet 1 tablet, 1 tablet, Oral, BID, Hailey Jennings MD, 1 tablet at 11/27/22 0851    HYDROcodone-acetaminophen (NORCO)  MG per tablet 1 tablet, 1 tablet, Oral, Q6H PRN, Nathan Gambino MD, 1 tablet at 11/27/22 0850    clopidogrel (PLAVIX) tablet 75 mg, 75 mg, Oral, Daily, Hailey Jennings MD, 75 mg at 11/27/22 0851    atorvastatin (LIPITOR) tablet 40 mg, 40 mg, Oral, Nightly, Nathan Gambino MD, 40 mg at 11/26/22 2045    aspirin chewable tablet 81 mg, 81 mg, Oral, Daily, Nathan Gambino MD, 81 mg at 11/27/22 0850    vitamin D (CHOLECALCIFEROL) tablet 2,000 Units, 2,000 Units, Oral, Daily, Nathan Gambino MD, 2,000 Units at 90/31/53 9044    folic acid (FOLVITE) tablet 1 mg, 1 mg, Oral, Daily, Nathan Gambino MD, 1 mg at 11/27/22 8804    levothyroxine (SYNTHROID) tablet 88 mcg, 88 mcg, Oral, Daily, Nathan Gambino MD, 88 mcg at 11/27/22 0617    cetirizine (ZYRTEC) tablet 5 mg, 5 mg, Oral, Daily, Nathan Gambino MD, 5 mg at 11/27/22 0851    pantoprazole (PROTONIX) tablet 40 mg, 40 mg, Oral, Daily, Nathan Gambino MD, 40 mg at 11/27/22 0850    cyanocobalamin injection 1,000 mcg, 1,000 mcg, IntraMUSCular, Q14 Days, Nathan Gambino MD, 1,000 mcg at 11/22/22 2121    ferrous sulfate (IRON 325) tablet 325 mg, 325 mg, Oral, Daily with breakfast, Nathan Gambino MD, 325 mg at 11/27/22 0851    metFORMIN (GLUCOPHAGE) tablet 500 mg, 500 mg, Oral, Daily with breakfast, Nathan Gambino MD, 500 mg at 11/27/22 0851    lactobacillus (Gaylin Caitlin) capsule 1 capsule, 1 capsule, Oral, Daily, Opal Fortune MD, 1 capsule at 11/27/22 0851    tiZANidine (ZANAFLEX) tablet 4 mg, 4 mg, Oral, Q8H PRN, Opal Fortune MD, 4 mg at 11/23/22 2025    diclofenac sodium (VOLTAREN) 1 % gel 4 g, 4 g, Topical, 4x Daily PRN, Opal Fortune MD    white petrolatum ointment, , Topical, BID, Opal Fortune MD, Given at 11/27/22 0852    miconazole (MICOTIN) 2 % powder, , Topical, BID, Opal Fortune MD, Given at 11/27/22 9691    Objective:    BP (!) 134/51   Pulse 81   Temp 98 °F (36.7 °C) (Oral)   Resp 16   Ht 5' 10\" (1.778 m)   Wt 169 lb 14.4 oz (77.1 kg)   SpO2 94%   BMI 24.38 kg/m²     Heart:  reg  Lungs:  ctab  Abd: + bs soft nontender  Extrem:  w/o edema    CBC with Differential:    Lab Results   Component Value Date/Time    WBC 13.2 11/22/2022 11:07 AM    RBC 3.95 11/22/2022 11:07 AM    HGB 12.2 11/22/2022 11:07 AM    HCT 38.1 11/22/2022 11:07 AM     11/22/2022 11:07 AM    MCV 96.5 11/22/2022 11:07 AM    MCH 30.9 11/22/2022 11:07 AM    MCHC 32.0 11/22/2022 11:07 AM    RDW 13.4 11/22/2022 11:07 AM    SEGSPCT 72 10/15/2013 09:41 AM    LYMPHOPCT 8.9 11/22/2022 11:07 AM    MONOPCT 7.3 11/22/2022 11:07 AM    BASOPCT 0.3 11/22/2022 11:07 AM    MONOSABS 0.96 11/22/2022 11:07 AM    LYMPHSABS 1.18 11/22/2022 11:07 AM    EOSABS 0.18 11/22/2022 11:07 AM    BASOSABS 0.04 11/22/2022 11:07 AM     CMP:    Lab Results   Component Value Date/Time     11/26/2022 02:00 AM    K 4.4 11/26/2022 02:00 AM    K 5.0 11/22/2022 11:07 AM     11/26/2022 02:00 AM    CO2 21 11/26/2022 02:00 AM    BUN 31 11/26/2022 02:00 AM    CREATININE 1.3 11/26/2022 02:00 AM    GFRAA 50 09/13/2022 10:45 AM    LABGLOM 54 11/26/2022 02:00 AM    GLUCOSE 145 11/26/2022 02:00 AM    GLUCOSE 121 09/07/2011 10:06 AM    PROT 6.3 11/22/2022 11:07 AM    LABALBU 3.7 11/22/2022 11:07 AM    LABALBU 4.2 09/07/2011 10:06 AM    CALCIUM 8.6 11/26/2022 02:00 AM    BILITOT 0.3 11/22/2022 11:07 AM    ALKPHOS 83 11/22/2022 11:07 AM    AST 20 11/22/2022 11:07 AM    ALT 8 11/22/2022 11:07 AM     Warfarin PT/INR:    Lab Results   Component Value Date    INR 1.0 07/18/2014    INR 1.0 07/04/2014    PROTIME 11.0 07/18/2014    PROTIME 10.7 07/04/2014     Psa 0.59    Assessment:    Principal Problem:    Non-ST elevation myocardial infarction (NSTEMI) ([facility] [address]  Active Problems:    Coronary artery disease involving native coronary artery of native heart without angina pectoris    Pure hypercholesterolemia    Chronic obstructive pulmonary disease (HCC)  Resolved Problems:    * No resolved hospital problems.  *      Plan:  Discussed case in detail with pt/pt daughter at bedside / jason sánchez / Shanice January / to Birgit Cannon, DO  9:30 AM  11/27/2022

## 2022-11-28 VITALS
OXYGEN SATURATION: 95 % | DIASTOLIC BLOOD PRESSURE: 60 MMHG | HEART RATE: 73 BPM | TEMPERATURE: 98.2 F | RESPIRATION RATE: 18 BRPM | SYSTOLIC BLOOD PRESSURE: 136 MMHG | BODY MASS INDEX: 24.32 KG/M2 | HEIGHT: 70 IN | WEIGHT: 169.9 LBS

## 2022-11-28 LAB
ALBUMIN SERPL-MCNC: 3.3 G/DL (ref 3.5–5.2)
ALP BLD-CCNC: 84 U/L (ref 40–129)
ALT SERPL-CCNC: 12 U/L (ref 0–40)
ANION GAP SERPL CALCULATED.3IONS-SCNC: 9 MMOL/L (ref 7–16)
AST SERPL-CCNC: 14 U/L (ref 0–39)
BILIRUB SERPL-MCNC: 0.4 MG/DL (ref 0–1.2)
BUN BLDV-MCNC: 43 MG/DL (ref 6–23)
CALCIUM SERPL-MCNC: 8.6 MG/DL (ref 8.6–10.2)
CHLORIDE BLD-SCNC: 105 MMOL/L (ref 98–107)
CO2: 25 MMOL/L (ref 22–29)
CREAT SERPL-MCNC: 1.5 MG/DL (ref 0.7–1.2)
GFR SERPL CREATININE-BSD FRML MDRD: 45 ML/MIN/1.73
GLUCOSE BLD-MCNC: 127 MG/DL (ref 74–99)
HCT VFR BLD CALC: 37.4 % (ref 37–54)
HEMOGLOBIN: 11.8 G/DL (ref 12.5–16.5)
MCH RBC QN AUTO: 31.1 PG (ref 26–35)
MCHC RBC AUTO-ENTMCNC: 31.6 % (ref 32–34.5)
MCV RBC AUTO: 98.7 FL (ref 80–99.9)
PDW BLD-RTO: 14.4 FL (ref 11.5–15)
PLATELET # BLD: 243 E9/L (ref 130–450)
PMV BLD AUTO: 11.3 FL (ref 7–12)
POTASSIUM SERPL-SCNC: 4.3 MMOL/L (ref 3.5–5)
RBC # BLD: 3.79 E12/L (ref 3.8–5.8)
SARS-COV-2, NAAT: NOT DETECTED
SODIUM BLD-SCNC: 139 MMOL/L (ref 132–146)
TOTAL PROTEIN: 5.7 G/DL (ref 6.4–8.3)
WBC # BLD: 11.6 E9/L (ref 4.5–11.5)

## 2022-11-28 PROCEDURE — 36415 COLL VENOUS BLD VENIPUNCTURE: CPT

## 2022-11-28 PROCEDURE — 6370000000 HC RX 637 (ALT 250 FOR IP): Performed by: INTERNAL MEDICINE

## 2022-11-28 PROCEDURE — 80053 COMPREHEN METABOLIC PANEL: CPT

## 2022-11-28 PROCEDURE — 87635 SARS-COV-2 COVID-19 AMP PRB: CPT

## 2022-11-28 PROCEDURE — 85027 COMPLETE CBC AUTOMATED: CPT

## 2022-11-28 RX ORDER — METOPROLOL SUCCINATE 25 MG/1
37.5 TABLET, EXTENDED RELEASE ORAL DAILY
Qty: 30 TABLET | Refills: 3
Start: 2022-11-29

## 2022-11-28 RX ORDER — CLOPIDOGREL BISULFATE 75 MG/1
75 TABLET ORAL DAILY
Qty: 30 TABLET | Refills: 3
Start: 2022-11-29

## 2022-11-28 RX ORDER — ATORVASTATIN CALCIUM 40 MG/1
40 TABLET, FILM COATED ORAL NIGHTLY
Qty: 30 TABLET | Refills: 3
Start: 2022-11-28

## 2022-11-28 RX ADMIN — Medication 1 CAPSULE: at 08:56

## 2022-11-28 RX ADMIN — MICONAZOLE NITRATE: 2 POWDER TOPICAL at 08:58

## 2022-11-28 RX ADMIN — HYDROCODONE BITARTRATE AND ACETAMINOPHEN 1 TABLET: 10; 325 TABLET ORAL at 08:56

## 2022-11-28 RX ADMIN — CETIRIZINE HYDROCHLORIDE 5 MG: 10 TABLET, FILM COATED ORAL at 08:55

## 2022-11-28 RX ADMIN — PETROLATUM: 420 OINTMENT TOPICAL at 08:58

## 2022-11-28 RX ADMIN — FERROUS SULFATE TAB 325 MG (65 MG ELEMENTAL FE) 325 MG: 325 (65 FE) TAB at 08:57

## 2022-11-28 RX ADMIN — CLOPIDOGREL BISULFATE 75 MG: 75 TABLET ORAL at 08:55

## 2022-11-28 RX ADMIN — HYDROCODONE BITARTRATE AND ACETAMINOPHEN 1 TABLET: 10; 325 TABLET ORAL at 16:11

## 2022-11-28 RX ADMIN — METFORMIN HYDROCHLORIDE 500 MG: 500 TABLET ORAL at 08:57

## 2022-11-28 RX ADMIN — METOPROLOL SUCCINATE 37.5 MG: 25 TABLET, EXTENDED RELEASE ORAL at 08:56

## 2022-11-28 RX ADMIN — PANTOPRAZOLE SODIUM 40 MG: 40 TABLET, DELAYED RELEASE ORAL at 08:56

## 2022-11-28 RX ADMIN — Medication 2000 UNITS: at 08:56

## 2022-11-28 RX ADMIN — FOLIC ACID 1 MG: 1 TABLET ORAL at 08:56

## 2022-11-28 RX ADMIN — SACUBITRIL AND VALSARTAN 1 TABLET: 49; 51 TABLET, FILM COATED ORAL at 08:56

## 2022-11-28 RX ADMIN — ASPIRIN 81 MG CHEWABLE TABLET 81 MG: 81 TABLET CHEWABLE at 08:57

## 2022-11-28 RX ADMIN — LEVOTHYROXINE SODIUM 88 MCG: 0.09 TABLET ORAL at 05:50

## 2022-11-28 ASSESSMENT — PAIN SCALES - GENERAL
PAINLEVEL_OUTOF10: 7
PAINLEVEL_OUTOF10: 0
PAINLEVEL_OUTOF10: 7

## 2022-11-28 NOTE — PROGRESS NOTES
Subjective:    Chief complaint:  See notes seen earlier this morning denies new issues  Daughter is by the bedside  Objective:    BP (!) 150/55   Pulse 70   Temp 98 °F (36.7 °C) (Oral)   Resp 18   Ht 5' 10\" (1.778 m)   Wt 169 lb 14.4 oz (77.1 kg)   SpO2 94%   BMI 24.38 kg/m²   General : Awake ,alert,no distress. Heart:  RRR, no murmurs, gallops, or rubs. Lungs:  CTA bilaterally, no wheeze, rales or rhonchi  Abd: bowel sounds present, nontender, nondistended, no masses  Extrem:  No clubbing, cyanosis, or edema    CBC:   Lab Results   Component Value Date/Time    WBC 11.6 11/28/2022 06:11 AM    RBC 3.79 11/28/2022 06:11 AM    HGB 11.8 11/28/2022 06:11 AM    HCT 37.4 11/28/2022 06:11 AM    MCV 98.7 11/28/2022 06:11 AM    MCH 31.1 11/28/2022 06:11 AM    MCHC 31.6 11/28/2022 06:11 AM    RDW 14.4 11/28/2022 06:11 AM     11/28/2022 06:11 AM    MPV 11.3 11/28/2022 06:11 AM     BMP:    Lab Results   Component Value Date/Time     11/28/2022 06:11 AM    K 4.3 11/28/2022 06:11 AM    K 5.0 11/22/2022 11:07 AM     11/28/2022 06:11 AM    CO2 25 11/28/2022 06:11 AM    BUN 43 11/28/2022 06:11 AM    LABALBU 3.3 11/28/2022 06:11 AM    LABALBU 4.2 09/07/2011 10:06 AM    CREATININE 1.5 11/28/2022 06:11 AM    CALCIUM 8.6 11/28/2022 06:11 AM    GFRAA 50 09/13/2022 10:45 AM    LABGLOM 45 11/28/2022 06:11 AM    GLUCOSE 127 11/28/2022 06:11 AM    GLUCOSE 121 09/07/2011 10:06 AM     PT/INR:    Lab Results   Component Value Date/Time    PROTIME 11.0 07/18/2014 01:15 PM    INR 1.0 07/18/2014 01:15 PM     Troponin:  No results found for: TROPONINI    No results for input(s): LABURIN in the last 72 hours. No results for input(s): BC in the last 72 hours. No results for input(s): [place] in the last 72 hours.       Current Facility-Administered Medications:     metoprolol succinate (TOPROL XL) extended release tablet 37.5 mg, 37.5 mg, Oral, Daily, Jie Harper MD, 37.5 mg at 11/28/22 0856    sacubitril-valsartan (ENTRESTO) 49-51 MG per tablet 1 tablet, 1 tablet, Oral, BID, Elke Morrow MD, 1 tablet at 11/28/22 0856    HYDROcodone-acetaminophen (NORCO)  MG per tablet 1 tablet, 1 tablet, Oral, Q6H PRN, Rosio Scott MD, 1 tablet at 11/28/22 0856    clopidogrel (PLAVIX) tablet 75 mg, 75 mg, Oral, Daily, Elke Morrow MD, 75 mg at 11/28/22 0855    atorvastatin (LIPITOR) tablet 40 mg, 40 mg, Oral, Nightly, Rosio Scott MD, 40 mg at 11/27/22 2132    aspirin chewable tablet 81 mg, 81 mg, Oral, Daily, Rosio Scott MD, 81 mg at 11/28/22 0857    vitamin D (CHOLECALCIFEROL) tablet 2,000 Units, 2,000 Units, Oral, Daily, Rosio Scott MD, 2,000 Units at 84/47/91 8518    folic acid (FOLVITE) tablet 1 mg, 1 mg, Oral, Daily, Rosio Scott MD, 1 mg at 11/28/22 0856    levothyroxine (SYNTHROID) tablet 88 mcg, 88 mcg, Oral, Daily, Rosio Scott MD, 88 mcg at 11/28/22 0550    cetirizine (ZYRTEC) tablet 5 mg, 5 mg, Oral, Daily, Rosio Scott MD, 5 mg at 11/28/22 0855    pantoprazole (PROTONIX) tablet 40 mg, 40 mg, Oral, Daily, Rosio Scott MD, 40 mg at 11/28/22 0856    cyanocobalamin injection 1,000 mcg, 1,000 mcg, IntraMUSCular, Q14 Days, Rosio Scott MD, 1,000 mcg at 11/22/22 2121    ferrous sulfate (IRON 325) tablet 325 mg, 325 mg, Oral, Daily with breakfast, Rosio Scott MD, 325 mg at 11/28/22 0857    metFORMIN (GLUCOPHAGE) tablet 500 mg, 500 mg, Oral, Daily with breakfast, Rosio Scott MD, 500 mg at 11/28/22 0857    lactobacillus (CULTURELLE) capsule 1 capsule, 1 capsule, Oral, Daily, Rosio Scott MD, 1 capsule at 11/28/22 0856    tiZANidine (ZANAFLEX) tablet 4 mg, 4 mg, Oral, Q8H PRN, Rosio Scott MD, 4 mg at 11/27/22 3489    diclofenac sodium (VOLTAREN) 1 % gel 4 g, 4 g, Topical, 4x Daily PRN, Rosio Scott MD    white petrolatum ointment, , Topical, BID, Rosio Scott MD, Given at 11/28/22 3832 miconazole (MICOTIN) 2 % powder, , Topical, BID, Rosio Scott MD, Given at 11/28/22 5635    ADULT DIET; Regular; 5 carb choices (75 gm/meal)  ADULT ORAL NUTRITION SUPPLEMENT; Breakfast, Dinner; Diabetic Oral Supplement    MRI LUMBAR SPINE WO CONTRAST   Final Result   Favored degenerative Schmorl's nodes involving the inferior endplate of L1   and the superior endplate of L5, as described above. Metastasis is not   entirely excluded with the L5 lesion appearing slightly more conspicuous and   not present on the remote imaging. Consider short-term imaging follow-up. Correlate with clinical concerns for underlying malignancy. No evidence of fracture. Multilevel lumbar spondylosis, as above. 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 Recommend   follow-up every 3 years. CT LUMBAR SPINE WO CONTRAST   Final Result   No definite CT evidence of acute lumbar spine pathology. Osteopenia with multiple lytic appearing areas scattered throughout the   lumbar spine and pelvis although the appearance is similar. This appearance   may be due to the osteopenia but other possibilities including multiple   myeloma cannot be excluded. Multilevel lumbar disc disease, spondylosis and facet arthropathy with   multilevel narrowing of the neural foramina and spinal stenosis, particularly   from L2-3 to L4-5. XR HIP BILATERAL W AP PELVIS (2 VIEWS)   Final Result   No acute abnormality of the pelvis and bilateral hips. IMPRESSION:   No acute osseous abnormality of the pelvis and bilateral bones. CT HEAD WO CONTRAST   Final Result   No acute intracranial abnormality. No acute osseous abnormality of the cervical spine. Degenerative changes of the cervical spine. 1.7 x 2.1 cm cyst visualized in the occipital subcutaneous soft tissues. CT CERVICAL SPINE WO CONTRAST   Final Result   No acute intracranial abnormality.       No acute osseous abnormality of the cervical spine. Degenerative changes of the cervical spine. 1.7 x 2.1 cm cyst visualized in the occipital subcutaneous soft tissues. XR CHEST PORTABLE   Final Result   No acute process. Assessment:    Principal Problem:    Non-ST elevation myocardial infarction (NSTEMI) ([address]  Active Problems:    Coronary artery disease involving native coronary artery of native heart without angina pectoris    Pure hypercholesterolemia    Chronic obstructive pulmonary disease (HCC)  Resolved Problems:    * No resolved hospital problems. *      Plan:  CT lumbar spine showing degenerative joint disease  Discharge to subacute rehab      Nevaeh Williamson MD  2:19 PM  11/28/2022    NOTE: This report was transcribed using voice recognition software.  Every effort was made to ensure accuracy; however, inadvertent transcription errors may be present

## 2022-11-28 NOTE — PROGRESS NOTES
Nurse to nurse report called to Dwaine Oreilly RN at [facility] [address].  Discharge paperwork faxed to [phone] per Katty's request.

## 2022-11-28 NOTE — CARE COORDINATION
Social Work/Discharge Planning:  Discharge order noted. Patient to discharge to [address] [place] [address]. Called [facility] and arranged ambulette transport for 4:00pm.  Notified patient and his daughter Maggy French of discharge time and private pay cost.  Notified RN and liaison Edgar Tirado with [facility] of discharge time.  Electronically signed by LEANN Carson on 11/28/2022 at 3:15 PM

## 2022-12-28 ENCOUNTER — CARE COORDINATION (OUTPATIENT)
Dept: CASE MANAGEMENT | Age: 82
End: 2022-12-28

## 2022-12-28 DIAGNOSIS — I21.4 NON-ST ELEVATION MYOCARDIAL INFARCTION (NSTEMI) (HCC): Primary | ICD-10-CM

## 2022-12-28 PROCEDURE — 1111F DSCHRG MED/CURRENT MED MERGE: CPT | Performed by: INTERNAL MEDICINE

## 2022-12-28 NOTE — CARE COORDINATION
[facility] Discharge Call    2022    Patient: Corbin Nascimento Patient : 1940   MRN: [id number]  Reason for Admission: NSTEMI  Discharge Date: 22 RARS: Readmission Risk Score: 19         Discharge Facility: [facility]    Acute Care Course:    to  [facility] after a fall and found to have an NSTEMI. No stent went for medical management. Start Lipitor, plavix, metoprolol, and entresto.  to  [facility] to home with [address] [place] [address] who has made a visit already. HFU made:  Per daughter wanted to wait for dad to get stronger with more PT. Educated on importance to make appt and have PCP review meds/POC and that this may be able to be vv. She stated she would call PCP. Sig Hx:   CAD, COPD, smoker, DMII with CKD3, prostate CA, chronic pain. DME: w/c, shower chair, raised toilet. Conversation:   Spoke with Benjamin Nina after 2 IDs. Pt is doing ok. He is using a w/c and needing help with his transfers. He was doing better in snf but is slacking on his exercises. He is making up on his sleep. She knows to make him do as much as he can for himself. She has been taking care of him and her mother until she  for a while. The w/c is borrowed and the hope is to get him back to the walker. They have a shower chair and is having the shower changed to a walk-in. W/c does fit thru doors. His appetite is not back and I educated on small portions and Ensure. He does not monitor his FBS but does his A1C. Pt has not smoked in 30 days and she told him that he has to [facility] outside to smoke\". She is hopeful that he has essentially quit. She takes his b/p, monitors his edema, stools, CP, weakness, SOB and will call PCP if needed. No PCP as wanted him to be more mobile. Educated on importance of this visit and the possibility of vv - to ask PCP office. Reviewed meds. Pt takes metformin with meals. He is taking lipitor, plavix, metoprolol and entresto. He takes Hydrocodone 5-325 and PRN and is needing only 1/day around midday. [facility] has made contact and the nurse from [facility] has been out. Follow up plan:  Daughter on top of his care and will close               [address] Transition      Do you have any ongoing symptoms?: No   Do you have all of your prescriptions and are they filled?: Yes   Do you have any questions related to your medications?: No   Have you scheduled your follow up appointment?: No   Were you discharged with any Home Care or  [facility] or do you currently have any active services?: Yes   Post Acute Services: Home Health (Comment: [facility])         Do you feel like you have everything you need to keep you well at home?: Yes   Care Transitions Interventions         Future Appointments   Date Time Provider Brendan Marrero   2023  2:00 PM Dann Rawls Ace, MD [facility]     Transitions of Care Initial Call    Was this an external facility discharge? Yes,   Discharge Facility: [facility]    Challenges to be reviewed by the provider   Additional needs identified to be addressed with provider: Yes  [id number]             Method of communication with provider : chart routing    Advance Care Planning:   Does patient have an Advance Directive: reviewed and current. Care Transition Nurse contacted the family by telephone to perform post hospital discharge assessment. Verified name and  with patient as identifiers. Provided introduction to self, and explanation of the CTN role. CTN reviewed discharge instructions, medical action plan and red flags with family who verbalized understanding. Family given an opportunity to ask questions and does not have any further questions or concerns at this time. Were discharge instructions available to patient? Yes. Reviewed appropriate site of care based on symptoms and resources available to patient including: PCP.  The family agrees to contact the PCP office for questions related to their healthcare. Medication reconciliation was performed with family, who verbalizes understanding of administration of home medications. Advised obtaining a 90-day supply of all daily and as-needed medications. Was patient discharged with a pulse oximeter? no    CTN provided contact information. No further follow-up call indicated based on severity of symptoms and risk factors.   Plan for next call:  none      TARIK Covarrubias, RN   [address] [place] [address] Transition Nurse  [phone]

## 2023-01-01 ENCOUNTER — CLINICAL DOCUMENTATION ONLY (OUTPATIENT)
Facility: CLINIC | Age: 83
End: 2023-01-01

## 2023-01-03 NOTE — DISCHARGE SUMMARY
Physician Discharge Summary     Patient ID:  Lorri Mullins  [id number]  53 y.o.  1940    Admit date: 11/22/2022    Discharge date and time: 11/28/2022  4:25 PM     Admission Diagnoses: Principal Problem:    Non-ST elevation myocardial infarction (NSTEMI) [facility])  Active Problems:    Coronary artery disease involving native coronary artery of native heart without angina pectoris    Pure hypercholesterolemia    Chronic obstructive pulmonary disease ([facility]  Resolved Problems:    * No resolved hospital problems. *      Discharge Diagnoses: Principal Problem:    Non-ST elevation myocardial infarction (NSTEMI) ([facility]  Active Problems:    Coronary artery disease involving native coronary artery of native heart without angina pectoris    Pure hypercholesterolemia    Chronic obstructive pulmonary disease ([facility]  Resolved Problems:    * No resolved hospital problems. *      Condition at discharge : Stable    Consults: IP CONSULT TO CARDIOLOGY  IP CONSULT TO CARDIOLOGY  IP CONSULT TO DIETITIAN  IP CONSULT TO SOCIAL WORK  IP CONSULT TO IV TEAM    Procedures: None    Hospital Course: Patient is 78-year-old gentleman presents to the emergency room with falls. He has been extremely weak recently. Daughter was having issues helping him out. Patient was noted to have elevated troponin. Echo showed wall motion abnormalities. Patient was then admitted. Seen by cardiology. Daughter requested medical management only and no heart catheterization. Because of her low back pain and leg weakness CT scan of LS-spine was ordered without contrast.  This revealed degenerative joint disease. He was then discharged to subacute rehab. MRI LUMBAR SPINE WO CONTRAST   Final Result   Favored degenerative Schmorl's nodes involving the inferior endplate of L1   and the superior endplate of L5, as described above.   Metastasis is not   entirely excluded with the L5 lesion appearing slightly more conspicuous and   not present on the remote imaging. Consider short-term imaging follow-up. Correlate with clinical concerns for underlying malignancy. No evidence of fracture. Multilevel lumbar spondylosis, as above. 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 Recommend   follow-up every 3 years. CT LUMBAR SPINE WO CONTRAST   Final Result   No definite CT evidence of acute lumbar spine pathology. Osteopenia with multiple lytic appearing areas scattered throughout the   lumbar spine and pelvis although the appearance is similar. This appearance   may be due to the osteopenia but other possibilities including multiple   myeloma cannot be excluded. Multilevel lumbar disc disease, spondylosis and facet arthropathy with   multilevel narrowing of the neural foramina and spinal stenosis, particularly   from L2-3 to L4-5. XR HIP BILATERAL W AP PELVIS (2 VIEWS)   Final Result   No acute abnormality of the pelvis and bilateral hips. IMPRESSION:   No acute osseous abnormality of the pelvis and bilateral bones. CT HEAD WO CONTRAST   Final Result   No acute intracranial abnormality. No acute osseous abnormality of the cervical spine. Degenerative changes of the cervical spine. 1.7 x 2.1 cm cyst visualized in the occipital subcutaneous soft tissues. CT CERVICAL SPINE WO CONTRAST   Final Result   No acute intracranial abnormality. No acute osseous abnormality of the cervical spine. Degenerative changes of the cervical spine. 1.7 x 2.1 cm cyst visualized in the occipital subcutaneous soft tissues. XR CHEST PORTABLE   Final Result   No acute process. No results found for this or any previous visit (from the past 336 hour(s)).       Discharge Exam:  See progress note from today    Disposition: Subacute rehab    Patient Instructions:   Discharge Medication List as of 11/28/2022  3:30 PM        START taking these medications    Details atorvastatin (LIPITOR) 40 MG tablet Take 1 tablet by mouth nightly, Disp-30 tablet, R-3NO PRINT      metoprolol succinate (TOPROL XL) 25 MG extended release tablet Take 1.5 tablets by mouth daily, Disp-30 tablet, R-3NO PRINT      sacubitril-valsartan (ENTRESTO) 49-51 MG per tablet Take 1 tablet by mouth 2 times daily, Disp-60 tablet, R-2NO PRINT      clopidogrel (PLAVIX) 75 MG tablet Take 1 tablet by mouth daily, Disp-30 tablet, R-3NO PRINT           CONTINUE these medications which have NOT CHANGED    Details   HYDROcodone-acetaminophen (NORCO) 5-325 MG per tablet Take 1 tablet by mouth every 6 hours as needed for Pain for up to 30 days. , Disp-120 tablet, R-0Normal      tiZANidine (ZANAFLEX) 4 MG tablet TAKE 1 TABLET BY MOUTH EVERY 8 HOURS AS NEEDED FOR PAIN, Disp-90 tablet, R-1Normal      FEROSUL 325 (65 Fe) MG tablet TAKE 1 TABLET BY MOUTH DAILY WITH BREAKFAST, Disp-30 tablet, R-3Normal      metFORMIN (GLUCOPHAGE) 500 MG tablet TAKE 1 TABLET BY MOUTH TWICE DAILY WITH MEALS, Disp-180 tablet, R-3Normal      pantoprazole (PROTONIX) 40 MG tablet TAKE 1 TABLET BY MOUTH DAILY, Disp-90 tablet, R-1Normal      cyanocobalamin 1000 MCG/ML injection INJECT 1ML INTO THE MUSCLE EVERY 14 DAYS, Disp-10 mL, [id number]      folic acid (FOLVITE) 1 MG tablet TAKE 1 TABLET BY MOUTH DAILY, Disp-90 tablet, R-3Normal      levothyroxine (SYNTHROID) 88 MCG tablet TAKE 1 TABLET BY MOUTH EVERY DAY, Disp-90 tablet, R-1Normal      diclofenac sodium (VOLTAREN) 1 % GEL Apply 4 g topically 4 times daily as needed for Pain, Topical, 4 TIMES DAILY PRN Starting Tue 2/15/2022, Disp-100 g, R-0, Normal      Tuberculin-Allergy Syringes (B-D TB SYRINGE .5CC/27GX1/2\") 27G X 1/2\" 0.5 ML MISC EVERY 14 DAYS Starting Tue 11/9/2021, Disp-50 each, R-0, Normal      Probiotic Product (PROBIOTIC DAILY PO) Take by mouthHistorical Med      loratadine (CLARITIN) 10 MG tablet Take 10 mg by mouth daily       Cholecalciferol (VITAMIN D3) 2000 UNITS CAPS Take 1 capsule by mouth daily. Last dose 7/16/2014      aspirin 81 MG tablet Take 81 mg by mouth daily. Last dose 7/15/2014           STOP taking these medications       amLODIPine (NORVASC) 2.5 MG tablet Comments:   Reason for Stopping:         losartan (COZAAR) 100 MG tablet Comments:   Reason for Stopping:         Multiple Vitamins-Minerals (THERAPEUTIC MULTIVITAMIN-MINERALS) tablet Comments:   Reason for Stopping:               Activity: As tolerated    Diet: Cardiac    Follow-up with JOSH Bustamante of the [facility]  [address]   [address] [place] [address]  [phone]            Note that over 30 minutes was spent in preparing discharge papers, discussing discharge with patient, medication review, etc.    Signed:  Oracio Brown MD  1/3/2023  11:37 AM

## 2023-01-10 PROBLEM — F32.1 CURRENT MODERATE EPISODE OF MAJOR DEPRESSIVE DISORDER WITHOUT PRIOR EPISODE (HCC): Status: ACTIVE | Noted: 2023-01-10

## 2023-03-28 ENCOUNTER — HOSPITAL ENCOUNTER (INPATIENT)
Age: 83
LOS: 6 days | Discharge: NURSING, REHAB OR LONG-TERM CARE | DRG: 444 | End: 2023-04-03
Attending: EMERGENCY MEDICINE | Admitting: INTERNAL MEDICINE
Payer: MEDICARE

## 2023-03-28 ENCOUNTER — APPOINTMENT (OUTPATIENT)
Dept: CT IMAGING | Age: 83
DRG: 444 | End: 2023-03-28
Payer: MEDICARE

## 2023-03-28 ENCOUNTER — APPOINTMENT (OUTPATIENT)
Dept: GENERAL RADIOLOGY | Age: 83
DRG: 444 | End: 2023-03-28
Payer: MEDICARE

## 2023-03-28 DIAGNOSIS — N17.9 AKI (ACUTE KIDNEY INJURY) (HCC): ICD-10-CM

## 2023-03-28 DIAGNOSIS — D72.829 LEUKOCYTOSIS, UNSPECIFIED TYPE: ICD-10-CM

## 2023-03-28 DIAGNOSIS — R93.5 ABNORMAL CT OF THE ABDOMEN: ICD-10-CM

## 2023-03-28 DIAGNOSIS — K80.20 CALCULUS OF GALLBLADDER WITHOUT CHOLECYSTITIS WITHOUT OBSTRUCTION: Primary | ICD-10-CM

## 2023-03-28 DIAGNOSIS — R74.01 TRANSAMINITIS: ICD-10-CM

## 2023-03-28 DIAGNOSIS — I95.9 HYPOTENSION, UNSPECIFIED HYPOTENSION TYPE: ICD-10-CM

## 2023-03-28 DIAGNOSIS — E87.0 HYPERNATREMIA: ICD-10-CM

## 2023-03-28 PROBLEM — N18.9 ACUTE KIDNEY INJURY SUPERIMPOSED ON CHRONIC KIDNEY DISEASE (HCC): Status: ACTIVE | Noted: 2023-03-28

## 2023-03-28 LAB
ALBUMIN SERPL-MCNC: 3.4 G/DL (ref 3.5–5.2)
ALP SERPL-CCNC: 799 U/L (ref 40–129)
ALT SERPL-CCNC: 73 U/L (ref 0–40)
ANION GAP SERPL CALCULATED.3IONS-SCNC: 15 MMOL/L (ref 7–16)
AST SERPL-CCNC: 67 U/L (ref 0–39)
B.E.: -8.5 MMOL/L (ref -3–3)
BACTERIA URNS QL MICRO: ABNORMAL /HPF
BASOPHILS # BLD: 0.06 E9/L (ref 0–0.2)
BASOPHILS NFR BLD: 0.4 % (ref 0–2)
BILIRUB SERPL-MCNC: 0.3 MG/DL (ref 0–1.2)
BILIRUB UR QL STRIP: NEGATIVE
BNP BLD-MCNC: 1741 PG/ML (ref 0–450)
BUN SERPL-MCNC: 114 MG/DL (ref 6–23)
CALCIUM SERPL-MCNC: 9.1 MG/DL (ref 8.6–10.2)
CHLORIDE SERPL-SCNC: 125 MMOL/L (ref 98–107)
CK SERPL-CCNC: 82 U/L (ref 20–200)
CLARITY UR: CLEAR
CO2 SERPL-SCNC: 20 MMOL/L (ref 22–29)
COHB: 1.2 % (ref 0–1.5)
COLOR UR: YELLOW
CREAT SERPL-MCNC: 2.4 MG/DL (ref 0.7–1.2)
CRITICAL: ABNORMAL
DATE ANALYZED: ABNORMAL
DATE OF COLLECTION: ABNORMAL
EOSINOPHIL # BLD: 0.19 E9/L (ref 0.05–0.5)
EOSINOPHIL NFR BLD: 1.3 % (ref 0–6)
ERYTHROCYTE [DISTWIDTH] IN BLOOD BY AUTOMATED COUNT: 16.3 FL (ref 11.5–15)
GLUCOSE SERPL-MCNC: 158 MG/DL (ref 74–99)
GLUCOSE UR STRIP-MCNC: NEGATIVE MG/DL
HCO3: 16.7 MMOL/L (ref 22–26)
HCT VFR BLD AUTO: 44 % (ref 37–54)
HGB BLD-MCNC: 13.1 G/DL (ref 12.5–16.5)
HGB UR QL STRIP: ABNORMAL
HHB: 6.9 % (ref 0–5)
IMM GRANULOCYTES # BLD: 0.11 E9/L
IMM GRANULOCYTES NFR BLD: 0.7 % (ref 0–5)
KETONES UR STRIP-MCNC: NEGATIVE MG/DL
LAB: ABNORMAL
LACTATE BLDV-SCNC: 1 MMOL/L (ref 0.5–1.9)
LACTATE BLDV-SCNC: 1.7 MMOL/L (ref 0.5–1.9)
LEUKOCYTE ESTERASE UR QL STRIP: NEGATIVE
LYMPHOCYTES # BLD: 1.31 E9/L (ref 1.5–4)
LYMPHOCYTES NFR BLD: 8.7 % (ref 20–42)
Lab: ABNORMAL
MCH RBC QN AUTO: 31.8 PG (ref 26–35)
MCHC RBC AUTO-ENTMCNC: 29.8 % (ref 32–34.5)
MCV RBC AUTO: 106.8 FL (ref 80–99.9)
METHB: 0.3 % (ref 0–1.5)
MODE: ABNORMAL
MONOCYTES # BLD: 1.1 E9/L (ref 0.1–0.95)
MONOCYTES NFR BLD: 7.3 % (ref 2–12)
NEUTROPHILS # BLD: 12.26 E9/L (ref 1.8–7.3)
NEUTS SEG NFR BLD: 81.6 % (ref 43–80)
NITRITE UR QL STRIP: NEGATIVE
O2 CONTENT: 16.9 ML/DL
O2 SATURATION: 93 % (ref 92–98.5)
O2HB: 91.6 % (ref 94–97)
OPERATOR ID: 420
PATIENT TEMP: 37 C
PCO2: 33.5 MMHG (ref 35–45)
PH BLOOD GAS: 7.32 (ref 7.35–7.45)
PH UR STRIP: 5 [PH] (ref 5–9)
PLATELET # BLD AUTO: 194 E9/L (ref 130–450)
PMV BLD AUTO: 14.2 FL (ref 7–12)
PO2: 70 MMHG (ref 75–100)
POTASSIUM SERPL-SCNC: 4.9 MMOL/L (ref 3.5–5)
PROT SERPL-MCNC: 7.1 G/DL (ref 6.4–8.3)
PROT UR STRIP-MCNC: 100 MG/DL
RBC # BLD AUTO: 4.12 E12/L (ref 3.8–5.8)
RBC #/AREA URNS HPF: ABNORMAL /HPF (ref 0–2)
SARS-COV-2 RDRP RESP QL NAA+PROBE: NOT DETECTED
SODIUM SERPL-SCNC: 160 MMOL/L (ref 132–146)
SOURCE, BLOOD GAS: ABNORMAL
SP GR UR STRIP: 1.02 (ref 1–1.03)
THB: 13.1 G/DL (ref 11.5–16.5)
TIME ANALYZED: 1547
TROPONIN, HIGH SENSITIVITY: 48 NG/L (ref 0–11)
TROPONIN, HIGH SENSITIVITY: 51 NG/L (ref 0–11)
UROBILINOGEN UR STRIP-ACNC: 0.2 E.U./DL
WBC # BLD: 15 E9/L (ref 4.5–11.5)
WBC #/AREA URNS HPF: ABNORMAL /HPF (ref 0–5)

## 2023-03-28 PROCEDURE — 87088 URINE BACTERIA CULTURE: CPT

## 2023-03-28 PROCEDURE — 82550 ASSAY OF CK (CPK): CPT

## 2023-03-28 PROCEDURE — 2580000003 HC RX 258

## 2023-03-28 PROCEDURE — 96360 HYDRATION IV INFUSION INIT: CPT

## 2023-03-28 PROCEDURE — 87635 SARS-COV-2 COVID-19 AMP PRB: CPT

## 2023-03-28 PROCEDURE — 87040 BLOOD CULTURE FOR BACTERIA: CPT

## 2023-03-28 PROCEDURE — 2580000003 HC RX 258: Performed by: EMERGENCY MEDICINE

## 2023-03-28 PROCEDURE — 71045 X-RAY EXAM CHEST 1 VIEW: CPT

## 2023-03-28 PROCEDURE — 99285 EMERGENCY DEPT VISIT HI MDM: CPT

## 2023-03-28 PROCEDURE — 83880 ASSAY OF NATRIURETIC PEPTIDE: CPT

## 2023-03-28 PROCEDURE — 82805 BLOOD GASES W/O2 SATURATION: CPT

## 2023-03-28 PROCEDURE — 51702 INSERT TEMP BLADDER CATH: CPT

## 2023-03-28 PROCEDURE — 84484 ASSAY OF TROPONIN QUANT: CPT

## 2023-03-28 PROCEDURE — 2060000000 HC ICU INTERMEDIATE R&B

## 2023-03-28 PROCEDURE — 80053 COMPREHEN METABOLIC PANEL: CPT

## 2023-03-28 PROCEDURE — 2500000003 HC RX 250 WO HCPCS: Performed by: EMERGENCY MEDICINE

## 2023-03-28 PROCEDURE — 96361 HYDRATE IV INFUSION ADD-ON: CPT

## 2023-03-28 PROCEDURE — 74176 CT ABD & PELVIS W/O CONTRAST: CPT

## 2023-03-28 PROCEDURE — 85025 COMPLETE CBC W/AUTO DIFF WBC: CPT

## 2023-03-28 PROCEDURE — 36415 COLL VENOUS BLD VENIPUNCTURE: CPT

## 2023-03-28 PROCEDURE — 70450 CT HEAD/BRAIN W/O DYE: CPT

## 2023-03-28 PROCEDURE — 6360000002 HC RX W HCPCS: Performed by: EMERGENCY MEDICINE

## 2023-03-28 PROCEDURE — 93005 ELECTROCARDIOGRAM TRACING: CPT

## 2023-03-28 PROCEDURE — 81001 URINALYSIS AUTO W/SCOPE: CPT

## 2023-03-28 PROCEDURE — 83605 ASSAY OF LACTIC ACID: CPT

## 2023-03-28 RX ORDER — INSULIN LISPRO 100 [IU]/ML
0-8 INJECTION, SOLUTION INTRAVENOUS; SUBCUTANEOUS
Status: DISCONTINUED | OUTPATIENT
Start: 2023-03-29 | End: 2023-04-03 | Stop reason: HOSPADM

## 2023-03-28 RX ORDER — LEVOTHYROXINE SODIUM 88 UG/1
88 TABLET ORAL DAILY
Status: DISCONTINUED | OUTPATIENT
Start: 2023-03-29 | End: 2023-03-31

## 2023-03-28 RX ORDER — CLOPIDOGREL BISULFATE 75 MG/1
75 TABLET ORAL DAILY
Status: DISCONTINUED | OUTPATIENT
Start: 2023-03-29 | End: 2023-04-03 | Stop reason: HOSPADM

## 2023-03-28 RX ORDER — METOPROLOL TARTRATE 50 MG/1
50 TABLET, FILM COATED ORAL 2 TIMES DAILY
Status: DISCONTINUED | OUTPATIENT
Start: 2023-03-28 | End: 2023-04-03 | Stop reason: HOSPADM

## 2023-03-28 RX ORDER — FERROUS SULFATE 325(65) MG
325 TABLET ORAL DAILY
Status: DISCONTINUED | OUTPATIENT
Start: 2023-03-29 | End: 2023-04-03 | Stop reason: HOSPADM

## 2023-03-28 RX ORDER — HYDROCODONE BITARTRATE AND ACETAMINOPHEN 5; 325 MG/1; MG/1
1 TABLET ORAL EVERY 6 HOURS
Status: DISCONTINUED | OUTPATIENT
Start: 2023-03-28 | End: 2023-03-29

## 2023-03-28 RX ORDER — PANTOPRAZOLE SODIUM 40 MG/1
40 TABLET, DELAYED RELEASE ORAL DAILY
Status: DISCONTINUED | OUTPATIENT
Start: 2023-03-29 | End: 2023-03-31

## 2023-03-28 RX ORDER — ATORVASTATIN CALCIUM 40 MG/1
40 TABLET, FILM COATED ORAL NIGHTLY
Status: DISCONTINUED | OUTPATIENT
Start: 2023-03-28 | End: 2023-04-03 | Stop reason: HOSPADM

## 2023-03-28 RX ORDER — CHOLECALCIFEROL (VITAMIN D3) 50 MCG
2000 TABLET ORAL DAILY
Status: DISCONTINUED | OUTPATIENT
Start: 2023-03-29 | End: 2023-04-03 | Stop reason: HOSPADM

## 2023-03-28 RX ORDER — SODIUM CHLORIDE 9 MG/ML
INJECTION, SOLUTION INTRAVENOUS CONTINUOUS
Status: DISCONTINUED | OUTPATIENT
Start: 2023-03-28 | End: 2023-03-29

## 2023-03-28 RX ORDER — INSULIN LISPRO 100 [IU]/ML
0-4 INJECTION, SOLUTION INTRAVENOUS; SUBCUTANEOUS NIGHTLY
Status: DISCONTINUED | OUTPATIENT
Start: 2023-03-28 | End: 2023-04-03 | Stop reason: HOSPADM

## 2023-03-28 RX ORDER — METRONIDAZOLE 500 MG/100ML
500 INJECTION, SOLUTION INTRAVENOUS ONCE
Status: COMPLETED | OUTPATIENT
Start: 2023-03-28 | End: 2023-03-28

## 2023-03-28 RX ORDER — 0.9 % SODIUM CHLORIDE 0.9 %
1000 INTRAVENOUS SOLUTION INTRAVENOUS ONCE
Status: COMPLETED | OUTPATIENT
Start: 2023-03-28 | End: 2023-03-28

## 2023-03-28 RX ORDER — DEXTROSE MONOHYDRATE 100 MG/ML
INJECTION, SOLUTION INTRAVENOUS CONTINUOUS PRN
Status: DISCONTINUED | OUTPATIENT
Start: 2023-03-28 | End: 2023-04-03 | Stop reason: HOSPADM

## 2023-03-28 RX ORDER — CETIRIZINE HYDROCHLORIDE 10 MG/1
5 TABLET ORAL DAILY
Status: DISCONTINUED | OUTPATIENT
Start: 2023-03-29 | End: 2023-04-03 | Stop reason: HOSPADM

## 2023-03-28 RX ORDER — FOLIC ACID 1 MG/1
1 TABLET ORAL DAILY
Status: DISCONTINUED | OUTPATIENT
Start: 2023-03-29 | End: 2023-04-03 | Stop reason: HOSPADM

## 2023-03-28 RX ORDER — SODIUM CHLORIDE 9 MG/ML
INJECTION, SOLUTION INTRAVENOUS CONTINUOUS
Status: DISCONTINUED | OUTPATIENT
Start: 2023-03-28 | End: 2023-03-28 | Stop reason: SDUPTHER

## 2023-03-28 RX ORDER — ASPIRIN 81 MG/1
81 TABLET ORAL DAILY
Status: DISCONTINUED | OUTPATIENT
Start: 2023-03-29 | End: 2023-04-03 | Stop reason: HOSPADM

## 2023-03-28 RX ADMIN — SODIUM CHLORIDE: 9 INJECTION, SOLUTION INTRAVENOUS at 16:19

## 2023-03-28 RX ADMIN — SODIUM CHLORIDE: 9 INJECTION, SOLUTION INTRAVENOUS at 22:50

## 2023-03-28 RX ADMIN — WATER 1000 MG: 1 INJECTION INTRAMUSCULAR; INTRAVENOUS; SUBCUTANEOUS at 19:39

## 2023-03-28 RX ADMIN — METRONIDAZOLE 500 MG: 500 INJECTION, SOLUTION INTRAVENOUS at 20:12

## 2023-03-28 RX ADMIN — SODIUM CHLORIDE 1000 ML: 9 INJECTION, SOLUTION INTRAVENOUS at 13:02

## 2023-03-28 ASSESSMENT — PAIN - FUNCTIONAL ASSESSMENT: PAIN_FUNCTIONAL_ASSESSMENT: NONE - DENIES PAIN

## 2023-03-28 NOTE — ED NOTES
ED to Inpatient Handoff Report    Notified Gay Spatz that electronic handoff available and patient ready for transport to room 630. Safety Risks: Memory Problems    Patient in Restraints: no    Constant Observer or Patient : no    Telemetry Monitoring Ordered :Yes           Order to transfer to unit without monitor:YES    Last MEWS: 2 Time completed: 1945    Vitals:    03/28/23 1227 03/28/23 1620 03/28/23 1945   BP: 101/73 (!) 123/46 (!) 108/45   Pulse: 66 69 75   Resp: 14 14 21   Temp: 97.7 °F (36.5 °C)  97.9 °F (36.6 °C)   TempSrc: Oral  Oral   SpO2: 92% 96% 94%   Weight: 150 lb (68 kg)     Height: 5' 10\" (1.778 m)         Opportunity for questions and clarification was provided.        Tony Trotter, TRUE  03/28/23 1954

## 2023-03-28 NOTE — ED PROVIDER NOTES
influenza. Both antigen test was negative. Rhabdomyolysis was evaluated as possible cause of patient's HEENA. Patient's CK was 82. Urinary tract infection was also considered however urinalysis was negative. CT scan of the abdomen shows cholelithiasis without cholecystitis. However patient has elevation in their alkaline phosphatase as well as her liver enzymes. Patient also has leukocytosis. Patient was given prophylactic antibiotics of Rocephin and Flagyl. General surgery was consulted and agreed to see the patient tomorrow. Patient will be admitted to medicine. Patient did have an elevated troponin she was most likely secondary to her kidney injury. Patient's hyponatremia is most likely secondary to patient's volume status. Patient is most likely dehydrated. ED Course as of 03/28/23 2325   Tue Mar 28, 2023   1427 EKG @ 1253: This EKG is signed and interpreted by Dr Amari Johnston. Rate: 64  Rhythm: Sinus  Interpretation: This rhythm, normal axis, , QRS 6 8, QTc 394, no evidence of preexcitation, no ST elevation, nonspecific changes, compared to 11/23/2022 there are changes  Comparison: changes compared to previous EKG   [ME]      ED Course User Index  [ME] Vernel Deep, DO          Chronic Conditions: Listed above    Records Reviewed: Office visit from [place] [id number] for background of patient's medical conditions. CONSULTS: (Who and What was discussed)  IP CONSULT TO IV TEAM  IP CONSULT TO GENERAL SURGERY  Spoke to general surgery about possible infection coming from patient's cholelithiasis. Liver enzymes were discussed. They agreed to see the patient tomorrow. My attending spoke to Dr. Rody Quintanilla who agreed to accept this patient. FINAL IMPRESSION      1. Calculus of gallbladder without cholecystitis without obstruction    2. Hypotension, unspecified hypotension type    3. HEENA (acute kidney injury) ([address]    4. Transaminitis    5. Hypernatremia    6.  Leukocytosis,

## 2023-03-29 ENCOUNTER — APPOINTMENT (OUTPATIENT)
Dept: GENERAL RADIOLOGY | Age: 83
DRG: 444 | End: 2023-03-29
Payer: MEDICARE

## 2023-03-29 ENCOUNTER — APPOINTMENT (OUTPATIENT)
Dept: ULTRASOUND IMAGING | Age: 83
DRG: 444 | End: 2023-03-29
Payer: MEDICARE

## 2023-03-29 PROBLEM — E43 SEVERE PROTEIN-CALORIE MALNUTRITION (HCC): Status: ACTIVE | Noted: 2023-03-29

## 2023-03-29 LAB
ALBUMIN SERPL-MCNC: 3.3 G/DL (ref 3.5–5.2)
ALP SERPL-CCNC: 674 U/L (ref 40–129)
ALT SERPL-CCNC: 53 U/L (ref 0–40)
ANION GAP SERPL CALCULATED.3IONS-SCNC: 10 MMOL/L (ref 7–16)
ANION GAP SERPL CALCULATED.3IONS-SCNC: 15 MMOL/L (ref 7–16)
AST SERPL-CCNC: 51 U/L (ref 0–39)
BASOPHILS # BLD: 0.06 E9/L (ref 0–0.2)
BASOPHILS NFR BLD: 0.4 % (ref 0–2)
BILIRUB SERPL-MCNC: 0.2 MG/DL (ref 0–1.2)
BUN SERPL-MCNC: 86 MG/DL (ref 6–23)
BUN SERPL-MCNC: 97 MG/DL (ref 6–23)
CALCIUM SERPL-MCNC: 8.4 MG/DL (ref 8.6–10.2)
CALCIUM SERPL-MCNC: 8.5 MG/DL (ref 8.6–10.2)
CHLORIDE SERPL-SCNC: 129 MMOL/L (ref 98–107)
CHLORIDE SERPL-SCNC: 135 MMOL/L (ref 98–107)
CO2 SERPL-SCNC: 16 MMOL/L (ref 22–29)
CO2 SERPL-SCNC: 21 MMOL/L (ref 22–29)
CREAT SERPL-MCNC: 1.8 MG/DL (ref 0.7–1.2)
CREAT SERPL-MCNC: 2 MG/DL (ref 0.7–1.2)
EKG ATRIAL RATE: 64 BPM
EKG P AXIS: 16 DEGREES
EKG P-R INTERVAL: 130 MS
EKG Q-T INTERVAL: 382 MS
EKG QRS DURATION: 68 MS
EKG QTC CALCULATION (BAZETT): 394 MS
EKG R AXIS: 17 DEGREES
EKG T AXIS: 41 DEGREES
EKG VENTRICULAR RATE: 64 BPM
EOSINOPHIL # BLD: 0.42 E9/L (ref 0.05–0.5)
EOSINOPHIL NFR BLD: 3.1 % (ref 0–6)
ERYTHROCYTE [DISTWIDTH] IN BLOOD BY AUTOMATED COUNT: 16.2 FL (ref 11.5–15)
GLUCOSE SERPL-MCNC: 156 MG/DL (ref 74–99)
GLUCOSE SERPL-MCNC: 169 MG/DL (ref 74–99)
HCT VFR BLD AUTO: 40.6 % (ref 37–54)
HGB BLD-MCNC: 12.1 G/DL (ref 12.5–16.5)
IMM GRANULOCYTES # BLD: 0.06 E9/L
IMM GRANULOCYTES NFR BLD: 0.4 % (ref 0–5)
LYMPHOCYTES # BLD: 0.79 E9/L (ref 1.5–4)
LYMPHOCYTES NFR BLD: 5.9 % (ref 20–42)
MCH RBC QN AUTO: 31.9 PG (ref 26–35)
MCHC RBC AUTO-ENTMCNC: 29.8 % (ref 32–34.5)
MCV RBC AUTO: 107.1 FL (ref 80–99.9)
METER GLUCOSE: 124 MG/DL (ref 74–99)
METER GLUCOSE: 128 MG/DL (ref 74–99)
METER GLUCOSE: 137 MG/DL (ref 74–99)
METER GLUCOSE: 142 MG/DL (ref 74–99)
METER GLUCOSE: 231 MG/DL (ref 74–99)
MONOCYTES # BLD: 0.91 E9/L (ref 0.1–0.95)
MONOCYTES NFR BLD: 6.7 % (ref 2–12)
NEUTROPHILS # BLD: 11.25 E9/L (ref 1.8–7.3)
NEUTS SEG NFR BLD: 83.5 % (ref 43–80)
PLATELET # BLD AUTO: 167 E9/L (ref 130–450)
PMV BLD AUTO: 14.4 FL (ref 7–12)
POTASSIUM SERPL-SCNC: 4.3 MMOL/L (ref 3.5–5)
POTASSIUM SERPL-SCNC: 4.3 MMOL/L (ref 3.5–5)
PROT SERPL-MCNC: 6.3 G/DL (ref 6.4–8.3)
RBC # BLD AUTO: 3.79 E12/L (ref 3.8–5.8)
SODIUM SERPL-SCNC: 160 MMOL/L (ref 132–146)
SODIUM SERPL-SCNC: 166 MMOL/L (ref 132–146)
WBC # BLD: 13.5 E9/L (ref 4.5–11.5)

## 2023-03-29 PROCEDURE — 99222 1ST HOSP IP/OBS MODERATE 55: CPT | Performed by: SURGERY

## 2023-03-29 PROCEDURE — 82962 GLUCOSE BLOOD TEST: CPT

## 2023-03-29 PROCEDURE — 85025 COMPLETE CBC W/AUTO DIFF WBC: CPT

## 2023-03-29 PROCEDURE — 76705 ECHO EXAM OF ABDOMEN: CPT

## 2023-03-29 PROCEDURE — 36415 COLL VENOUS BLD VENIPUNCTURE: CPT

## 2023-03-29 PROCEDURE — 2500000003 HC RX 250 WO HCPCS: Performed by: RADIOLOGY

## 2023-03-29 PROCEDURE — 6370000000 HC RX 637 (ALT 250 FOR IP): Performed by: INTERNAL MEDICINE

## 2023-03-29 PROCEDURE — 2580000003 HC RX 258: Performed by: INTERNAL MEDICINE

## 2023-03-29 PROCEDURE — 92526 ORAL FUNCTION THERAPY: CPT | Performed by: SPEECH-LANGUAGE PATHOLOGIST

## 2023-03-29 PROCEDURE — 80048 BASIC METABOLIC PNL TOTAL CA: CPT

## 2023-03-29 PROCEDURE — 97165 OT EVAL LOW COMPLEX 30 MIN: CPT

## 2023-03-29 PROCEDURE — 80053 COMPREHEN METABOLIC PANEL: CPT

## 2023-03-29 PROCEDURE — 97530 THERAPEUTIC ACTIVITIES: CPT

## 2023-03-29 PROCEDURE — 92611 MOTION FLUOROSCOPY/SWALLOW: CPT | Performed by: SPEECH-LANGUAGE PATHOLOGIST

## 2023-03-29 PROCEDURE — 74230 X-RAY XM SWLNG FUNCJ C+: CPT

## 2023-03-29 PROCEDURE — 93010 ELECTROCARDIOGRAM REPORT: CPT | Performed by: INTERNAL MEDICINE

## 2023-03-29 PROCEDURE — 2060000000 HC ICU INTERMEDIATE R&B

## 2023-03-29 PROCEDURE — 6370000000 HC RX 637 (ALT 250 FOR IP): Performed by: SURGERY

## 2023-03-29 RX ORDER — SODIUM CHLORIDE 450 MG/100ML
INJECTION, SOLUTION INTRAVENOUS CONTINUOUS
Status: DISCONTINUED | OUTPATIENT
Start: 2023-03-29 | End: 2023-03-30

## 2023-03-29 RX ORDER — HYDROCODONE BITARTRATE AND ACETAMINOPHEN 5; 325 MG/1; MG/1
1 TABLET ORAL EVERY 6 HOURS
Status: DISCONTINUED | OUTPATIENT
Start: 2023-03-29 | End: 2023-04-03 | Stop reason: HOSPADM

## 2023-03-29 RX ADMIN — SERTRALINE HYDROCHLORIDE 50 MG: 50 TABLET ORAL at 15:42

## 2023-03-29 RX ADMIN — BARIUM SULFATE 120 ML: 400 SUSPENSION ORAL at 12:56

## 2023-03-29 RX ADMIN — HYDROCODONE BITARTRATE AND ACETAMINOPHEN 1 TABLET: 5; 325 TABLET ORAL at 05:49

## 2023-03-29 RX ADMIN — LEVOTHYROXINE SODIUM 88 MCG: 0.09 TABLET ORAL at 15:41

## 2023-03-29 RX ADMIN — FERROUS SULFATE TAB 325 MG (65 MG ELEMENTAL FE) 325 MG: 325 (65 FE) TAB at 15:42

## 2023-03-29 RX ADMIN — ATORVASTATIN CALCIUM 40 MG: 40 TABLET, FILM COATED ORAL at 00:07

## 2023-03-29 RX ADMIN — HYDROCODONE BITARTRATE AND ACETAMINOPHEN 1 TABLET: 5; 325 TABLET ORAL at 00:07

## 2023-03-29 RX ADMIN — METFORMIN HYDROCHLORIDE 500 MG: 500 TABLET ORAL at 15:43

## 2023-03-29 RX ADMIN — PETROLATUM: 420 OINTMENT TOPICAL at 18:58

## 2023-03-29 RX ADMIN — HYDROCODONE BITARTRATE AND ACETAMINOPHEN 1 TABLET: 5; 325 TABLET ORAL at 15:42

## 2023-03-29 RX ADMIN — FOLIC ACID 1 MG: 1 TABLET ORAL at 15:41

## 2023-03-29 RX ADMIN — SACUBITRIL AND VALSARTAN 1 TABLET: 49; 51 TABLET, FILM COATED ORAL at 20:09

## 2023-03-29 RX ADMIN — HYDROCODONE BITARTRATE AND ACETAMINOPHEN 1 TABLET: 5; 325 TABLET ORAL at 21:51

## 2023-03-29 RX ADMIN — BARIUM SULFATE 70 G: 0.81 POWDER, FOR SUSPENSION ORAL at 12:56

## 2023-03-29 RX ADMIN — BARIUM SULFATE 10 ML: 400 PASTE ORAL at 12:55

## 2023-03-29 RX ADMIN — ATORVASTATIN CALCIUM 40 MG: 40 TABLET, FILM COATED ORAL at 20:06

## 2023-03-29 RX ADMIN — Medication 2000 UNITS: at 15:42

## 2023-03-29 RX ADMIN — SODIUM CHLORIDE: 9 INJECTION, SOLUTION INTRAVENOUS at 00:43

## 2023-03-29 RX ADMIN — SODIUM CHLORIDE: 4.5 INJECTION, SOLUTION INTRAVENOUS at 20:05

## 2023-03-29 RX ADMIN — ASPIRIN 81 MG: 81 TABLET, COATED ORAL at 09:25

## 2023-03-29 RX ADMIN — PANTOPRAZOLE SODIUM 40 MG: 40 TABLET, DELAYED RELEASE ORAL at 15:42

## 2023-03-29 RX ADMIN — METOPROLOL TARTRATE 50 MG: 50 TABLET, FILM COATED ORAL at 15:43

## 2023-03-29 RX ADMIN — SODIUM CHLORIDE: 4.5 INJECTION, SOLUTION INTRAVENOUS at 09:35

## 2023-03-29 RX ADMIN — METOPROLOL TARTRATE 50 MG: 50 TABLET, FILM COATED ORAL at 20:08

## 2023-03-29 RX ADMIN — CETIRIZINE HYDROCHLORIDE 5 MG: 10 TABLET, FILM COATED ORAL at 15:41

## 2023-03-29 RX ADMIN — CLOPIDOGREL BISULFATE 75 MG: 75 TABLET ORAL at 15:42

## 2023-03-29 ASSESSMENT — PAIN DESCRIPTION - LOCATION
LOCATION: BACK
LOCATION: BACK

## 2023-03-29 ASSESSMENT — PAIN DESCRIPTION - ORIENTATION
ORIENTATION: RIGHT;LEFT
ORIENTATION: MID

## 2023-03-29 ASSESSMENT — PAIN SCALES - GENERAL
PAINLEVEL_OUTOF10: 4
PAINLEVEL_OUTOF10: 0
PAINLEVEL_OUTOF10: 5

## 2023-03-29 ASSESSMENT — PAIN DESCRIPTION - DESCRIPTORS
DESCRIPTORS: ACHING;DISCOMFORT;SORE
DESCRIPTORS: SORE

## 2023-03-29 NOTE — ACP (ADVANCE CARE PLANNING)
Advance Care Planning   Healthcare Decision Maker:    Primary Decision Maker: Ena Colby - Charles - [phone]    Click here to complete Healthcare Decision Makers including selection of the Healthcare Decision Maker Relationship (ie \"Primary\"). Today we documented Decision Maker(s) consistent with ACP documents on file.        [facility] [phone]

## 2023-03-29 NOTE — PLAN OF CARE
Problem: Discharge Planning  Goal: Discharge to home or other facility with appropriate resources  3/29/2023 0654 by Rancho Paris RN  Outcome: Progressing  3/28/2023 2035 by Rancho Paris RN  Outcome: Progressing     Problem: ABCDS Injury Assessment  Goal: Absence of physical injury  3/29/2023 0654 by Rancho Paris RN  Outcome: Progressing  3/28/2023 2035 by Rancho Paris RN  Outcome: Progressing     Problem: Safety - Adult  Goal: Free from fall injury  3/29/2023 0654 by Rancho Paris RN  Outcome: Progressing  3/28/2023 2035 by Rancho Paris RN  Outcome: Progressing     Problem: Discharge Planning  Goal: Discharge to home or other facility with appropriate resources  3/29/2023 0654 by Rancho Paris RN  Outcome: Progressing  3/28/2023 2035 by Rancho Paris RN  Outcome: Progressing     Problem: ABCDS Injury Assessment  Goal: Absence of physical injury  3/29/2023 0654 by Rancho Paris RN  Outcome: Progressing  3/28/2023 2035 by Rancho Paris RN  Outcome: Progressing     Problem: Safety - Adult  Goal: Free from fall injury  3/29/2023 0654 by Rancho Paris RN  Outcome: Progressing  3/28/2023 2035 by Rancho Paris RN  Outcome: Progressing

## 2023-03-29 NOTE — PLAN OF CARE
Problem: Discharge Planning  Goal: Discharge to home or other facility with appropriate resources  Outcome: Progressing     Problem: ABCDS Injury Assessment  Goal: Absence of physical injury  Outcome: Progressing     Problem: Safety - Adult  Goal: Free from fall injury  Outcome: Progressing     Problem: Discharge Planning  Goal: Discharge to home or other facility with appropriate resources  Outcome: Progressing     Problem: ABCDS Injury Assessment  Goal: Absence of physical injury  Outcome: Progressing     Problem: Safety - Adult  Goal: Free from fall injury  Outcome: Progressing

## 2023-03-29 NOTE — H&P
Social History     Socioeconomic History    Marital status:      Spouse name: Not on file    Number of children: 5    Years of education: Not on file    Highest education level: Not on file   Occupational History    Occupation: retired     Comment: glass installation   Tobacco Use    Smoking status: Every Day     Packs/day: 0.50     Years: 65.00     Pack years: 32.50     Types: Cigarettes    Smokeless tobacco: Never   Vaping Use    Vaping Use: Never used   Substance and Sexual Activity    Alcohol use: Never     Alcohol/week: 0.0 standard drinks    Drug use: Never    Sexual activity: Not on file   Other Topics Concern    Not on file   Social History Narrative    Not on file     Social Determinants of Health     Financial Resource Strain: Low Risk     Difficulty of Paying Living Expenses: Not hard at all   Food Insecurity: No Food Insecurity    Worried About Running Out of Food in the Last Year: Never true    Ran Out of Food in the Last Year: Never true   Transportation Needs: Not on file   Physical Activity: Inactive    Days of Exercise per Week: 0 days    Minutes of Exercise per Session: 0 min   Stress: Not on file   Social Connections: Not on file   Intimate Partner Violence: Not on file   Housing Stability: Not on file         Family History:       Problem Relation Age of Onset    Thyroid Disease Mother     Other Mother         COPD, pulmonary embolism    Stroke Father     Other Sister         lung issues    Prostate Cancer Brother     Arthritis Sister     Arthritis Sister     Arthritis Sister        REVIEW OF SYSTEMS:    Patient is confused and review of systems is inaccurate    Vitals:  /61   Pulse 71   Temp 97.4 °F (36.3 °C) (Axillary)   Resp 18   Ht 5' 10\" (1.778 m)   Wt 148 lb (67.1 kg)   SpO2 95%   BMI 21.24 kg/m²     PHYSICAL EXAM:  General:  Awake, confused presents with agitation or distress   HEENT:  Normocephalic, atraumatic. Pupils equal, round, reactive to light.   No scleral

## 2023-03-29 NOTE — DISCHARGE INSTR - COC
Continuity of Care Form    Patient Name: Jam Skaggs   :  1940  MRN:  [id number]    Admit date:  3/28/2023  Discharge date:  4/3/2023    Code Status Order: Prior   Advance Directives:     Admitting Physician:  Kemi Roland MD  PCP: Kemi Roland MD    Discharging Nurse: Jacqueline Hinkle RN  [address] Unit/Room#: 5350/4442-M  Discharging Unit Phone Number: [phone]    Emergency Contact:   Extended Emergency Contact Information  Primary Emergency Contact: Terry Jackson  Address: [address] [place] [address] [address] [facility] Phone: [phone]  Mobile Phone: [phone]  Relation: Child  Secondary Emergency Contact: [facility] [address] [facility] Phone: [phone]  Relation: Child    Past Surgical History:  Past Surgical History:   Procedure Laterality Date    CATARACT REMOVAL WITH IMPLANT Bilateral     Dr. Shani Fernandez      Dr. Neli Boucher    COLONOSCOPY N/A 3/18/2022    COLONOSCOPY DIAGNOSTIC performed by Lenka Melchor MD at [facility] ENDOSCOPY    ERCP  2014    13 stones removed     Venia Black    Dr. Love Marquez of [facility]    Dr. Fatmata Bustamante of cholesteatoma     SINUS SURGERY  2002    repair of nasal septum    UPPER GASTROINTESTINAL ENDOSCOPY N/A 3/18/2022    EGD BIOPSY performed by Lenka Melchor MD at [facility] ENDOSCOPY       Immunization History:   Immunization History   Administered Date(s) Administered    COVID-19, MODERNA BLUE border, Primary or Immunocompromised, (age 12y+), IM, 100 mcg/0.5mL 2021, 2021, 2021, 2021    COVID-19, MODERNA Booster BLUE border, (age 18y+), IM, 50mcg/0.25mL 2022    Influenza Vaccine, unspecified formulation 10/15/2013, 10/09/2014    Influenza, FLUARIX, FLULAVAL, FLUZONE (age 10 mo+) AND AFLURIA, (age 1 y+), PF, 0.5mL 2016    Influenza, FLUZONE (age 72 y+), High

## 2023-03-30 LAB
ANION GAP SERPL CALCULATED.3IONS-SCNC: 6 MMOL/L (ref 7–16)
ANION GAP SERPL CALCULATED.3IONS-SCNC: 8 MMOL/L (ref 7–16)
BACTERIA UR CULT: NORMAL
BUN SERPL-MCNC: 67 MG/DL (ref 6–23)
BUN SERPL-MCNC: 74 MG/DL (ref 6–23)
CALCIUM SERPL-MCNC: 8 MG/DL (ref 8.6–10.2)
CALCIUM SERPL-MCNC: 8.4 MG/DL (ref 8.6–10.2)
CHLORIDE SERPL-SCNC: 128 MMOL/L (ref 98–107)
CHLORIDE SERPL-SCNC: 134 MMOL/L (ref 98–107)
CO2 SERPL-SCNC: 21 MMOL/L (ref 22–29)
CO2 SERPL-SCNC: 22 MMOL/L (ref 22–29)
CREAT SERPL-MCNC: 1.8 MG/DL (ref 0.7–1.2)
CREAT SERPL-MCNC: 1.9 MG/DL (ref 0.7–1.2)
GLUCOSE SERPL-MCNC: 120 MG/DL (ref 74–99)
GLUCOSE SERPL-MCNC: 200 MG/DL (ref 74–99)
METER GLUCOSE: 147 MG/DL (ref 74–99)
METER GLUCOSE: 189 MG/DL (ref 74–99)
METER GLUCOSE: 195 MG/DL (ref 74–99)
POTASSIUM SERPL-SCNC: 4.1 MMOL/L (ref 3.5–5)
POTASSIUM SERPL-SCNC: 4.2 MMOL/L (ref 3.5–5)
PROCALCITONIN: 0.39 NG/ML (ref 0–0.08)
SODIUM SERPL-SCNC: 155 MMOL/L (ref 132–146)
SODIUM SERPL-SCNC: 164 MMOL/L (ref 132–146)

## 2023-03-30 PROCEDURE — 6370000000 HC RX 637 (ALT 250 FOR IP): Performed by: SURGERY

## 2023-03-30 PROCEDURE — 82962 GLUCOSE BLOOD TEST: CPT

## 2023-03-30 PROCEDURE — 2580000003 HC RX 258: Performed by: INTERNAL MEDICINE

## 2023-03-30 PROCEDURE — 2060000000 HC ICU INTERMEDIATE R&B

## 2023-03-30 PROCEDURE — 99232 SBSQ HOSP IP/OBS MODERATE 35: CPT | Performed by: SURGERY

## 2023-03-30 PROCEDURE — 92526 ORAL FUNCTION THERAPY: CPT | Performed by: SPEECH-LANGUAGE PATHOLOGIST

## 2023-03-30 PROCEDURE — 36415 COLL VENOUS BLD VENIPUNCTURE: CPT

## 2023-03-30 PROCEDURE — 97110 THERAPEUTIC EXERCISES: CPT | Performed by: SPEECH-LANGUAGE PATHOLOGIST

## 2023-03-30 PROCEDURE — 6370000000 HC RX 637 (ALT 250 FOR IP): Performed by: INTERNAL MEDICINE

## 2023-03-30 PROCEDURE — 84145 PROCALCITONIN (PCT): CPT

## 2023-03-30 PROCEDURE — 80048 BASIC METABOLIC PNL TOTAL CA: CPT

## 2023-03-30 RX ORDER — CIPROFLOXACIN 250 MG/1
250 TABLET, FILM COATED ORAL EVERY 12 HOURS SCHEDULED
Status: DISCONTINUED | OUTPATIENT
Start: 2023-03-30 | End: 2023-04-01

## 2023-03-30 RX ORDER — METRONIDAZOLE 500 MG/1
500 TABLET ORAL EVERY 8 HOURS SCHEDULED
Status: DISCONTINUED | OUTPATIENT
Start: 2023-03-30 | End: 2023-04-01

## 2023-03-30 RX ORDER — DEXTROSE MONOHYDRATE 50 MG/ML
INJECTION, SOLUTION INTRAVENOUS CONTINUOUS
Status: DISCONTINUED | OUTPATIENT
Start: 2023-03-30 | End: 2023-04-03 | Stop reason: HOSPADM

## 2023-03-30 RX ORDER — CIPROFLOXACIN 500 MG/1
500 TABLET, FILM COATED ORAL EVERY 12 HOURS SCHEDULED
Status: DISCONTINUED | OUTPATIENT
Start: 2023-03-30 | End: 2023-03-30 | Stop reason: DRUGHIGH

## 2023-03-30 RX ADMIN — SERTRALINE HYDROCHLORIDE 50 MG: 50 TABLET ORAL at 09:09

## 2023-03-30 RX ADMIN — HYDROCODONE BITARTRATE AND ACETAMINOPHEN 1 TABLET: 5; 325 TABLET ORAL at 21:44

## 2023-03-30 RX ADMIN — DEXTROSE MONOHYDRATE: 50 INJECTION, SOLUTION INTRAVENOUS at 12:23

## 2023-03-30 RX ADMIN — SODIUM CHLORIDE: 4.5 INJECTION, SOLUTION INTRAVENOUS at 09:07

## 2023-03-30 RX ADMIN — CETIRIZINE HYDROCHLORIDE 5 MG: 10 TABLET, FILM COATED ORAL at 09:09

## 2023-03-30 RX ADMIN — METFORMIN HYDROCHLORIDE 500 MG: 500 TABLET ORAL at 09:09

## 2023-03-30 RX ADMIN — METFORMIN HYDROCHLORIDE 500 MG: 500 TABLET ORAL at 16:47

## 2023-03-30 RX ADMIN — PANTOPRAZOLE SODIUM 40 MG: 40 TABLET, DELAYED RELEASE ORAL at 09:09

## 2023-03-30 RX ADMIN — CLOPIDOGREL BISULFATE 75 MG: 75 TABLET ORAL at 09:09

## 2023-03-30 RX ADMIN — LEVOTHYROXINE SODIUM 88 MCG: 0.09 TABLET ORAL at 09:10

## 2023-03-30 RX ADMIN — METRONIDAZOLE 500 MG: 500 TABLET ORAL at 15:29

## 2023-03-30 RX ADMIN — ATORVASTATIN CALCIUM 40 MG: 40 TABLET, FILM COATED ORAL at 21:44

## 2023-03-30 RX ADMIN — METOPROLOL TARTRATE 50 MG: 50 TABLET, FILM COATED ORAL at 21:44

## 2023-03-30 RX ADMIN — Medication 2000 UNITS: at 09:09

## 2023-03-30 RX ADMIN — FOLIC ACID 1 MG: 1 TABLET ORAL at 09:09

## 2023-03-30 RX ADMIN — SACUBITRIL AND VALSARTAN 1 TABLET: 49; 51 TABLET, FILM COATED ORAL at 09:13

## 2023-03-30 RX ADMIN — SACUBITRIL AND VALSARTAN 1 TABLET: 49; 51 TABLET, FILM COATED ORAL at 23:06

## 2023-03-30 RX ADMIN — ASPIRIN 81 MG: 81 TABLET, COATED ORAL at 09:09

## 2023-03-30 RX ADMIN — FERROUS SULFATE TAB 325 MG (65 MG ELEMENTAL FE) 325 MG: 325 (65 FE) TAB at 09:07

## 2023-03-30 RX ADMIN — METRONIDAZOLE 500 MG: 500 TABLET ORAL at 21:44

## 2023-03-30 RX ADMIN — HYDROCODONE BITARTRATE AND ACETAMINOPHEN 1 TABLET: 5; 325 TABLET ORAL at 03:17

## 2023-03-30 RX ADMIN — CIPROFLOXACIN 250 MG: 250 TABLET, FILM COATED ORAL at 21:44

## 2023-03-30 RX ADMIN — PETROLATUM: 420 OINTMENT TOPICAL at 09:15

## 2023-03-30 RX ADMIN — METOPROLOL TARTRATE 50 MG: 50 TABLET, FILM COATED ORAL at 09:07

## 2023-03-30 ASSESSMENT — PAIN SCALES - GENERAL
PAINLEVEL_OUTOF10: 0
PAINLEVEL_OUTOF10: 0

## 2023-03-30 NOTE — FLOWSHEET NOTE
PT had a little amount blood in urine when we cleaned him up. Pt had his rios removed early today. Will continue to monitor and supervisor notified.

## 2023-03-30 NOTE — PLAN OF CARE
Problem: Discharge Planning  Goal: Discharge to home or other facility with appropriate resources  Outcome: Progressing     Problem: ABCDS Injury Assessment  Goal: Absence of physical injury  Outcome: Progressing     Problem: Safety - Adult  Goal: Free from fall injury  Outcome: Progressing     Problem: Chronic Conditions and Co-morbidities  Goal: Patient's chronic conditions and co-morbidity symptoms are monitored and maintained or improved  Outcome: Progressing     Problem: Nutrition Deficit:  Goal: Optimize nutritional status  Outcome: Progressing     Problem: Skin/Tissue Integrity  Goal: Absence of new skin breakdown  Description: 1. Monitor for areas of redness and/or skin breakdown  2. Assess vascular access sites hourly  3. Every 4-6 hours minimum:  Change oxygen saturation probe site  4. Every 4-6 hours:  If on nasal continuous positive airway pressure, respiratory therapy assess nares and determine need for appliance change or resting period. Outcome: Progressing     Problem: Discharge Planning  Goal: Discharge to home or other facility with appropriate resources  Outcome: Progressing     Problem: ABCDS Injury Assessment  Goal: Absence of physical injury  Outcome: Progressing     Problem: Safety - Adult  Goal: Free from fall injury  Outcome: Progressing     Problem: Chronic Conditions and Co-morbidities  Goal: Patient's chronic conditions and co-morbidity symptoms are monitored and maintained or improved  Outcome: Progressing     Problem: Nutrition Deficit:  Goal: Optimize nutritional status  Outcome: Progressing     Problem: Skin/Tissue Integrity  Goal: Absence of new skin breakdown  Description: 1. Monitor for areas of redness and/or skin breakdown  2. Assess vascular access sites hourly  3. Every 4-6 hours minimum:  Change oxygen saturation probe site  4.   Every 4-6 hours:  If on nasal continuous positive airway pressure, respiratory therapy assess nares and determine need for appliance change

## 2023-03-31 LAB
ALBUMIN SERPL-MCNC: 2.8 G/DL (ref 3.5–5.2)
ALP SERPL-CCNC: 550 U/L (ref 40–129)
ALT SERPL-CCNC: 40 U/L (ref 0–40)
ANION GAP SERPL CALCULATED.3IONS-SCNC: 7 MMOL/L (ref 7–16)
AST SERPL-CCNC: 45 U/L (ref 0–39)
BILIRUB DIRECT SERPL-MCNC: <0.2 MG/DL (ref 0–0.3)
BILIRUB INDIRECT SERPL-MCNC: ABNORMAL MG/DL (ref 0–1)
BILIRUB SERPL-MCNC: <0.2 MG/DL (ref 0–1.2)
BUN SERPL-MCNC: 61 MG/DL (ref 6–23)
CALCIUM SERPL-MCNC: 8 MG/DL (ref 8.6–10.2)
CHLORIDE SERPL-SCNC: 130 MMOL/L (ref 98–107)
CO2 SERPL-SCNC: 22 MMOL/L (ref 22–29)
CREAT SERPL-MCNC: 1.7 MG/DL (ref 0.7–1.2)
GLUCOSE SERPL-MCNC: 188 MG/DL (ref 74–99)
METER GLUCOSE: 147 MG/DL (ref 74–99)
METER GLUCOSE: 155 MG/DL (ref 74–99)
METER GLUCOSE: 173 MG/DL (ref 74–99)
METER GLUCOSE: 253 MG/DL (ref 74–99)
POTASSIUM SERPL-SCNC: 4.6 MMOL/L (ref 3.5–5)
PROT SERPL-MCNC: 5.4 G/DL (ref 6.4–8.3)
SODIUM SERPL-SCNC: 159 MMOL/L (ref 132–146)

## 2023-03-31 PROCEDURE — 82962 GLUCOSE BLOOD TEST: CPT

## 2023-03-31 PROCEDURE — 97161 PT EVAL LOW COMPLEX 20 MIN: CPT

## 2023-03-31 PROCEDURE — 80076 HEPATIC FUNCTION PANEL: CPT

## 2023-03-31 PROCEDURE — 80048 BASIC METABOLIC PNL TOTAL CA: CPT

## 2023-03-31 PROCEDURE — 97530 THERAPEUTIC ACTIVITIES: CPT

## 2023-03-31 PROCEDURE — 2060000000 HC ICU INTERMEDIATE R&B

## 2023-03-31 PROCEDURE — 6370000000 HC RX 637 (ALT 250 FOR IP): Performed by: INTERNAL MEDICINE

## 2023-03-31 PROCEDURE — 97535 SELF CARE MNGMENT TRAINING: CPT

## 2023-03-31 PROCEDURE — 92526 ORAL FUNCTION THERAPY: CPT | Performed by: SPEECH-LANGUAGE PATHOLOGIST

## 2023-03-31 PROCEDURE — 36415 COLL VENOUS BLD VENIPUNCTURE: CPT

## 2023-03-31 PROCEDURE — 2580000003 HC RX 258: Performed by: INTERNAL MEDICINE

## 2023-03-31 RX ORDER — PANTOPRAZOLE SODIUM 40 MG/1
40 TABLET, DELAYED RELEASE ORAL
Status: DISCONTINUED | OUTPATIENT
Start: 2023-03-31 | End: 2023-04-03 | Stop reason: HOSPADM

## 2023-03-31 RX ORDER — SODIUM CHLORIDE 0.9 % (FLUSH) 0.9 %
5-40 SYRINGE (ML) INJECTION PRN
Status: DISCONTINUED | OUTPATIENT
Start: 2023-03-31 | End: 2023-04-03 | Stop reason: HOSPADM

## 2023-03-31 RX ORDER — SODIUM CHLORIDE 0.9 % (FLUSH) 0.9 %
5-40 SYRINGE (ML) INJECTION 2 TIMES DAILY
Status: DISCONTINUED | OUTPATIENT
Start: 2023-03-31 | End: 2023-04-03 | Stop reason: HOSPADM

## 2023-03-31 RX ORDER — LEVOTHYROXINE SODIUM 88 UG/1
88 TABLET ORAL
Status: DISCONTINUED | OUTPATIENT
Start: 2023-03-31 | End: 2023-04-03 | Stop reason: HOSPADM

## 2023-03-31 RX ADMIN — METRONIDAZOLE 500 MG: 500 TABLET ORAL at 14:57

## 2023-03-31 RX ADMIN — SODIUM CHLORIDE, PRESERVATIVE FREE 10 ML: 5 INJECTION INTRAVENOUS at 09:00

## 2023-03-31 RX ADMIN — METRONIDAZOLE 500 MG: 500 TABLET ORAL at 05:02

## 2023-03-31 RX ADMIN — CETIRIZINE HYDROCHLORIDE 5 MG: 10 TABLET, FILM COATED ORAL at 08:58

## 2023-03-31 RX ADMIN — ASPIRIN 81 MG: 81 TABLET, COATED ORAL at 08:58

## 2023-03-31 RX ADMIN — ATORVASTATIN CALCIUM 40 MG: 40 TABLET, FILM COATED ORAL at 20:05

## 2023-03-31 RX ADMIN — DEXTROSE MONOHYDRATE: 50 INJECTION, SOLUTION INTRAVENOUS at 09:10

## 2023-03-31 RX ADMIN — SACUBITRIL AND VALSARTAN 1 TABLET: 49; 51 TABLET, FILM COATED ORAL at 20:05

## 2023-03-31 RX ADMIN — CIPROFLOXACIN 250 MG: 250 TABLET, FILM COATED ORAL at 20:05

## 2023-03-31 RX ADMIN — METFORMIN HYDROCHLORIDE 500 MG: 500 TABLET ORAL at 17:08

## 2023-03-31 RX ADMIN — Medication 2000 UNITS: at 08:58

## 2023-03-31 RX ADMIN — LEVOTHYROXINE SODIUM 88 MCG: 0.09 TABLET ORAL at 10:46

## 2023-03-31 RX ADMIN — INSULIN LISPRO 4 UNITS: 100 INJECTION, SOLUTION INTRAVENOUS; SUBCUTANEOUS at 12:24

## 2023-03-31 RX ADMIN — FOLIC ACID 1 MG: 1 TABLET ORAL at 08:58

## 2023-03-31 RX ADMIN — PETROLATUM: 420 OINTMENT TOPICAL at 09:00

## 2023-03-31 RX ADMIN — SACUBITRIL AND VALSARTAN 1 TABLET: 49; 51 TABLET, FILM COATED ORAL at 08:58

## 2023-03-31 RX ADMIN — METRONIDAZOLE 500 MG: 500 TABLET ORAL at 20:05

## 2023-03-31 RX ADMIN — METOPROLOL TARTRATE 50 MG: 50 TABLET, FILM COATED ORAL at 08:58

## 2023-03-31 RX ADMIN — CLOPIDOGREL BISULFATE 75 MG: 75 TABLET ORAL at 08:57

## 2023-03-31 RX ADMIN — SERTRALINE HYDROCHLORIDE 50 MG: 50 TABLET ORAL at 08:58

## 2023-03-31 RX ADMIN — PANTOPRAZOLE SODIUM 40 MG: 40 TABLET, DELAYED RELEASE ORAL at 08:58

## 2023-03-31 RX ADMIN — METFORMIN HYDROCHLORIDE 500 MG: 500 TABLET ORAL at 08:58

## 2023-03-31 RX ADMIN — METOPROLOL TARTRATE 50 MG: 50 TABLET, FILM COATED ORAL at 20:05

## 2023-03-31 RX ADMIN — CIPROFLOXACIN 250 MG: 250 TABLET, FILM COATED ORAL at 08:58

## 2023-03-31 RX ADMIN — FERROUS SULFATE TAB 325 MG (65 MG ELEMENTAL FE) 325 MG: 325 (65 FE) TAB at 08:57

## 2023-03-31 ASSESSMENT — PAIN SCALES - GENERAL
PAINLEVEL_OUTOF10: 0
PAINLEVEL_OUTOF10: 0

## 2023-03-31 NOTE — PLAN OF CARE
Problem: Discharge Planning  Goal: Discharge to home or other facility with appropriate resources  3/30/2023 2209 by Ruben Balderas RN  Outcome: Progressing  Flowsheets (Taken 3/30/2023 1930)  Discharge to home or other facility with appropriate resources: Identify barriers to discharge with patient and caregiver  3/30/2023 1233 by Frandy Hines RN  Outcome: Progressing     Problem: ABCDS Injury Assessment  Goal: Absence of physical injury  3/30/2023 2209 by Ruben Balderas RN  Outcome: Progressing  3/30/2023 1233 by Frandy Hines RN  Outcome: Progressing     Problem: Safety - Adult  Goal: Free from fall injury  3/30/2023 2209 by Ruben Balderas RN  Outcome: Progressing  3/30/2023 1233 by Frandy Hines RN  Outcome: Progressing     Problem: Chronic Conditions and Co-morbidities  Goal: Patient's chronic conditions and co-morbidity symptoms are monitored and maintained or improved  3/30/2023 2209 by Ruben Balderas RN  Outcome: Progressing  Flowsheets (Taken 3/30/2023 1930)  Care Plan - Patient's Chronic Conditions and Co-Morbidity Symptoms are Monitored and Maintained or Improved: Monitor and assess patient's chronic conditions and comorbid symptoms for stability, deterioration, or improvement  3/30/2023 1233 by Frandy Hines RN  Outcome: Progressing     Problem: Nutrition Deficit:  Goal: Optimize nutritional status  3/30/2023 2209 by Ruben Balderas RN  Outcome: Progressing  3/30/2023 1233 by Frandy Hines RN  Outcome: Progressing     Problem: Skin/Tissue Integrity  Goal: Absence of new skin breakdown  Description: 1. Monitor for areas of redness and/or skin breakdown  2. Assess vascular access sites hourly  3. Every 4-6 hours minimum:  Change oxygen saturation probe site  4. Every 4-6 hours:  If on nasal continuous positive airway pressure, respiratory therapy assess nares and determine need for appliance change or resting period.   3/30/2023 2209 by Ruben Balderas RN  Outcome: Progressing  3/30/2023 1233 by

## 2023-04-01 LAB
ALBUMIN SERPL-MCNC: 2.7 G/DL (ref 3.5–5.2)
ALP SERPL-CCNC: 572 U/L (ref 40–129)
ALT SERPL-CCNC: 41 U/L (ref 0–40)
AST SERPL-CCNC: 47 U/L (ref 0–39)
BASOPHILS # BLD: 0.05 E9/L (ref 0–0.2)
BASOPHILS NFR BLD: 0.5 % (ref 0–2)
BILIRUB DIRECT SERPL-MCNC: <0.2 MG/DL (ref 0–0.3)
BILIRUB INDIRECT SERPL-MCNC: ABNORMAL MG/DL (ref 0–1)
BILIRUB SERPL-MCNC: 0.3 MG/DL (ref 0–1.2)
EOSINOPHIL # BLD: 0.9 E9/L (ref 0.05–0.5)
EOSINOPHIL NFR BLD: 8.6 % (ref 0–6)
ERYTHROCYTE [DISTWIDTH] IN BLOOD BY AUTOMATED COUNT: 16.1 FL (ref 11.5–15)
HCT VFR BLD AUTO: 39 % (ref 37–54)
HGB BLD-MCNC: 11.8 G/DL (ref 12.5–16.5)
IMM GRANULOCYTES # BLD: 0.04 E9/L
IMM GRANULOCYTES NFR BLD: 0.4 % (ref 0–5)
LYMPHOCYTES # BLD: 1.14 E9/L (ref 1.5–4)
LYMPHOCYTES NFR BLD: 11 % (ref 20–42)
MCH RBC QN AUTO: 31.6 PG (ref 26–35)
MCHC RBC AUTO-ENTMCNC: 30.3 % (ref 32–34.5)
MCV RBC AUTO: 104.3 FL (ref 80–99.9)
METER GLUCOSE: 146 MG/DL (ref 74–99)
METER GLUCOSE: 195 MG/DL (ref 74–99)
METER GLUCOSE: 212 MG/DL (ref 74–99)
METER GLUCOSE: 226 MG/DL (ref 74–99)
MONOCYTES # BLD: 0.69 E9/L (ref 0.1–0.95)
MONOCYTES NFR BLD: 6.6 % (ref 2–12)
NEUTROPHILS # BLD: 7.59 E9/L (ref 1.8–7.3)
NEUTS SEG NFR BLD: 72.9 % (ref 43–80)
PLATELET # BLD AUTO: 141 E9/L (ref 130–450)
PMV BLD AUTO: 14.4 FL (ref 7–12)
PROCALCITONIN: 0.28 NG/ML (ref 0–0.08)
PROT SERPL-MCNC: 5.7 G/DL (ref 6.4–8.3)
RBC # BLD AUTO: 3.74 E12/L (ref 3.8–5.8)
WBC # BLD: 10.4 E9/L (ref 4.5–11.5)

## 2023-04-01 PROCEDURE — 84145 PROCALCITONIN (PCT): CPT

## 2023-04-01 PROCEDURE — 36415 COLL VENOUS BLD VENIPUNCTURE: CPT

## 2023-04-01 PROCEDURE — 2580000003 HC RX 258: Performed by: INTERNAL MEDICINE

## 2023-04-01 PROCEDURE — 80076 HEPATIC FUNCTION PANEL: CPT

## 2023-04-01 PROCEDURE — 6370000000 HC RX 637 (ALT 250 FOR IP): Performed by: INTERNAL MEDICINE

## 2023-04-01 PROCEDURE — 84075 ASSAY ALKALINE PHOSPHATASE: CPT

## 2023-04-01 PROCEDURE — 85025 COMPLETE CBC W/AUTO DIFF WBC: CPT

## 2023-04-01 PROCEDURE — 82962 GLUCOSE BLOOD TEST: CPT

## 2023-04-01 PROCEDURE — 84080 ASSAY ALKALINE PHOSPHATASES: CPT

## 2023-04-01 PROCEDURE — 2060000000 HC ICU INTERMEDIATE R&B

## 2023-04-01 RX ADMIN — CLOPIDOGREL BISULFATE 75 MG: 75 TABLET ORAL at 09:17

## 2023-04-01 RX ADMIN — SACUBITRIL AND VALSARTAN 1 TABLET: 49; 51 TABLET, FILM COATED ORAL at 20:11

## 2023-04-01 RX ADMIN — DEXTROSE MONOHYDRATE: 50 INJECTION, SOLUTION INTRAVENOUS at 09:14

## 2023-04-01 RX ADMIN — SODIUM CHLORIDE, PRESERVATIVE FREE 10 ML: 5 INJECTION INTRAVENOUS at 20:11

## 2023-04-01 RX ADMIN — ATORVASTATIN CALCIUM 40 MG: 40 TABLET, FILM COATED ORAL at 20:11

## 2023-04-01 RX ADMIN — CETIRIZINE HYDROCHLORIDE 5 MG: 10 TABLET, FILM COATED ORAL at 09:16

## 2023-04-01 RX ADMIN — SACUBITRIL AND VALSARTAN 1 TABLET: 49; 51 TABLET, FILM COATED ORAL at 09:16

## 2023-04-01 RX ADMIN — PETROLATUM: 420 OINTMENT TOPICAL at 09:15

## 2023-04-01 RX ADMIN — METOPROLOL TARTRATE 50 MG: 50 TABLET, FILM COATED ORAL at 20:11

## 2023-04-01 RX ADMIN — CIPROFLOXACIN 250 MG: 250 TABLET, FILM COATED ORAL at 09:17

## 2023-04-01 RX ADMIN — SERTRALINE HYDROCHLORIDE 50 MG: 50 TABLET ORAL at 09:16

## 2023-04-01 RX ADMIN — METFORMIN HYDROCHLORIDE 500 MG: 500 TABLET ORAL at 20:11

## 2023-04-01 RX ADMIN — METOPROLOL TARTRATE 50 MG: 50 TABLET, FILM COATED ORAL at 09:16

## 2023-04-01 RX ADMIN — FOLIC ACID 1 MG: 1 TABLET ORAL at 09:16

## 2023-04-01 RX ADMIN — Medication 2000 UNITS: at 09:17

## 2023-04-01 RX ADMIN — LEVOTHYROXINE SODIUM 88 MCG: 0.09 TABLET ORAL at 05:49

## 2023-04-01 RX ADMIN — FERROUS SULFATE TAB 325 MG (65 MG ELEMENTAL FE) 325 MG: 325 (65 FE) TAB at 09:16

## 2023-04-01 RX ADMIN — ASPIRIN 81 MG: 81 TABLET, COATED ORAL at 09:21

## 2023-04-01 RX ADMIN — METRONIDAZOLE 500 MG: 500 TABLET ORAL at 05:49

## 2023-04-01 RX ADMIN — PANTOPRAZOLE SODIUM 40 MG: 40 TABLET, DELAYED RELEASE ORAL at 05:49

## 2023-04-01 RX ADMIN — METFORMIN HYDROCHLORIDE 500 MG: 500 TABLET ORAL at 09:17

## 2023-04-01 ASSESSMENT — PAIN SCALES - GENERAL
PAINLEVEL_OUTOF10: 0
PAINLEVEL_OUTOF10: 0

## 2023-04-01 NOTE — CONSULTS
GENERAL SURGERY  CONSULT NOTE  3/29/2023    Physician Consulted: Dr. Roger Cobian   Reason for Consult: cholelithiasis   Referring Physician: Dr. Jenny Whitlock   CC: change in mental status    HPI  Niharika Dickerson is a 78 y.o. male w hx of prostate cancer, diabetes, hypertension, hyperlipidemia presented to the emergency department by his daughter for evaluation of hypotension. Per chart review of the emergency department providers note the patient's daughter is reports increasing weakness and decreased oral intake. Upon evaluation this morning patient was unable to provide any reliable history. Patient was confused this morning but denying any abdominal pain nausea, vomiting. Work-up in the emergency department revealed sodium of 160, creatinine of 2.0, BUN of 114. CT abdomen pelvis showing cholelithiasis without evidence of cholecystitis. General surgery was consulted for further evaluation. History of ERCP 2014 with multiple stones removed at that time. CT abdomen pelvis showing multiple gallstones without wall thickening or pericholecystic fluid. There is some pneumobilia likely secondary to previous ERCP. WBC elevated at 15 yesterday repeat pending.   Negative UA    Past Medical History:   Diagnosis Date    Anemia     Aortic valve sclerosis 7/4/2014    no  cardiology, no treatment, no s/s    Cancer ([address] 10/2018    prostate, no active issues    Diabetes mellitus ([address]     borderline,was on insulin and metformin , last dose 7/4/2014,     DISH (diffuse idiopathic skeletal hyperostosis) 7/4/2014    Council (hard of hearing)     will not use aides    HTN (hypertension) 7/4/2014    Hyperlipidemia     Hypertension     bp has been slightly above normal    Hypothyroid 7/4/2014    Thyroid disease     Tobacco abuse 7/4/2014    Vertigo 1994    Vitamin B 12 deficiency 7/4/2014    Vitamin D deficiency        Past Surgical History:   Procedure Laterality Date    Judi Means    Dr. Hardy Brown
thin consistencies of barium contrast.      Presence of deep penetration to nectar consistencies of barium contrast.      Please see separate speech pathology report for full discussion of findings   and recommendations. RECOMMENDATIONS:   Unavailable         US GALLBLADDER RUQ   Final Result   Cholelithiasis without ultrasound evidence of acute cholecystitis. Common   bile duct is prominent at 6-7 mm. CT ABDOMEN PELVIS WO CONTRAST   Final Result   Left lower lobe atelectasis or pneumonitis. Cholelithiasis but no CT evidence of acute cholecystitis. Pneumobilia is   presumably due to previous ERCP. Diverticulosis but no evidence of diverticulitis. Prostate enlargement. Distal abdominal aortic aneurysm. Follow-up imaging every 3 years is   recommended. XR CHEST PORTABLE   Final Result   No acute process. CT HEAD WO CONTRAST   Final Result   1. There is no acute intracranial abnormality. Specifically, there is no   intracranial hemorrhage. 2. Atrophy and periventricular leukomalacia,   . Microbiology:  Pending  No results for input(s): BC in the last 72 hours. No results for input(s): ORG in the last 72 hours. No results for input(s): [place] in the last 72 hours. No results for input(s): STREPNEUMAGU in the last 72 hours. No results for input(s): LP1UAG in the last 72 hours. No results for input(s): ASO in the last 72 hours. No results for input(s): CULTRESP in the last 72 hours. Assessment:  Abnormal labs: elevated Na, chloride with [place],  elevated BNP, LFTs. Improving   Leucocytosis: no sign of pneumonia, no fevers UA is clean. There is small decub wound with no sign of infection. There is cholelithiasis but no sign of cholecystitis. Blood cx have been negative. Plan:    Stopped antibiotics. Monitor labs  Will follow with you    Thank you for having us see this patient in consultation.  I will be discussing this case with the
within the urinary bladder which is largely decompressed. The prostate is enlarged. Peritoneum/Retroperitoneum: There is atherosclerotic calcification of the abdominal aorta and most of the large abdominal arteries. There is aneurysmal dilatation of the distal abdominal aorta measuring as much as 3.1 cm. No abdominal or pelvic lymphadenopathy. Bones/Soft Tissues: There is moderate lower thoracic and lumbar spondylosis. There is generalized osteopenia. No definite lytic or blastic osseous lesions. Left lower lobe atelectasis or pneumonitis. Cholelithiasis but no CT evidence of acute cholecystitis. Pneumobilia is presumably due to previous ERCP. Diverticulosis but no evidence of diverticulitis. Prostate enlargement. Distal abdominal aortic aneurysm. Follow-up imaging every 3 years is recommended. CT HEAD WO CONTRAST    Result Date: 3/28/2023  EXAMINATION: CT OF THE HEAD WITHOUT CONTRAST  3/28/2023 2:03 pm TECHNIQUE: CT of the head was performed without the administration of intravenous contrast. Automated exposure control, iterative reconstruction, and/or weight based adjustment of the mA/kV was utilized to reduce the radiation dose to as low as reasonably achievable. COMPARISON: None. HISTORY: ORDERING SYSTEM PROVIDED HISTORY: dysphagia TECHNOLOGIST PROVIDED HISTORY: Reason for exam:->dysphagia Has a \"code stroke\" or \"stroke alert\" been called? ->No Decision Support Exception - unselect if not a suspected or confirmed emergency medical condition->Emergency Medical Condition (MA) FINDINGS: BRAIN/VENTRICLES: There is no acute intracranial hemorrhage, mass effect or midline shift. No abnormal extra-axial fluid collection. The gray-white differentiation is maintained without evidence of an acute infarct. There is no evidence of hydrocephalus. The ventricles, cisterns and sulci are prominent consistent with atrophy.   There is decreased attenuation within the periventricular white matter consistent with

## 2023-04-02 LAB
ALBUMIN SERPL-MCNC: 2.9 G/DL (ref 3.5–5.2)
ALP SERPL-CCNC: 618 U/L (ref 40–129)
ALT SERPL-CCNC: 42 U/L (ref 0–40)
ANION GAP SERPL CALCULATED.3IONS-SCNC: 8 MMOL/L (ref 7–16)
AST SERPL-CCNC: 42 U/L (ref 0–39)
BACTERIA BLD CULT ORG #2: NORMAL
BACTERIA BLD CULT: NORMAL
BASOPHILS # BLD: 0.05 E9/L (ref 0–0.2)
BASOPHILS NFR BLD: 0.4 % (ref 0–2)
BILIRUB DIRECT SERPL-MCNC: <0.2 MG/DL (ref 0–0.3)
BILIRUB INDIRECT SERPL-MCNC: ABNORMAL MG/DL (ref 0–1)
BILIRUB SERPL-MCNC: 0.4 MG/DL (ref 0–1.2)
BUN SERPL-MCNC: 28 MG/DL (ref 6–23)
CALCIUM SERPL-MCNC: 8.1 MG/DL (ref 8.6–10.2)
CHLORIDE SERPL-SCNC: 109 MMOL/L (ref 98–107)
CO2 SERPL-SCNC: 22 MMOL/L (ref 22–29)
CREAT SERPL-MCNC: 1.3 MG/DL (ref 0.7–1.2)
EOSINOPHIL # BLD: 0.93 E9/L (ref 0.05–0.5)
EOSINOPHIL NFR BLD: 8.2 % (ref 0–6)
ERYTHROCYTE [DISTWIDTH] IN BLOOD BY AUTOMATED COUNT: 15.7 FL (ref 11.5–15)
GLUCOSE SERPL-MCNC: 221 MG/DL (ref 74–99)
HCT VFR BLD AUTO: 40 % (ref 37–54)
HGB BLD-MCNC: 12 G/DL (ref 12.5–16.5)
IMM GRANULOCYTES # BLD: 0.06 E9/L
IMM GRANULOCYTES NFR BLD: 0.5 % (ref 0–5)
LYMPHOCYTES # BLD: 1.41 E9/L (ref 1.5–4)
LYMPHOCYTES NFR BLD: 12.4 % (ref 20–42)
MCH RBC QN AUTO: 31 PG (ref 26–35)
MCHC RBC AUTO-ENTMCNC: 30 % (ref 32–34.5)
MCV RBC AUTO: 103.4 FL (ref 80–99.9)
METER GLUCOSE: 137 MG/DL (ref 74–99)
METER GLUCOSE: 189 MG/DL (ref 74–99)
METER GLUCOSE: 199 MG/DL (ref 74–99)
METER GLUCOSE: 239 MG/DL (ref 74–99)
MONOCYTES # BLD: 0.82 E9/L (ref 0.1–0.95)
MONOCYTES NFR BLD: 7.2 % (ref 2–12)
NEUTROPHILS # BLD: 8.09 E9/L (ref 1.8–7.3)
NEUTS SEG NFR BLD: 71.3 % (ref 43–80)
PLATELET # BLD AUTO: 140 E9/L (ref 130–450)
PMV BLD AUTO: 14.7 FL (ref 7–12)
POTASSIUM SERPL-SCNC: 4.4 MMOL/L (ref 3.5–5)
PROT SERPL-MCNC: 5.9 G/DL (ref 6.4–8.3)
RBC # BLD AUTO: 3.87 E12/L (ref 3.8–5.8)
SODIUM SERPL-SCNC: 139 MMOL/L (ref 132–146)
WBC # BLD: 11.4 E9/L (ref 4.5–11.5)

## 2023-04-02 PROCEDURE — 80076 HEPATIC FUNCTION PANEL: CPT

## 2023-04-02 PROCEDURE — 82962 GLUCOSE BLOOD TEST: CPT

## 2023-04-02 PROCEDURE — 36415 COLL VENOUS BLD VENIPUNCTURE: CPT

## 2023-04-02 PROCEDURE — 2580000003 HC RX 258: Performed by: INTERNAL MEDICINE

## 2023-04-02 PROCEDURE — 85025 COMPLETE CBC W/AUTO DIFF WBC: CPT

## 2023-04-02 PROCEDURE — 80048 BASIC METABOLIC PNL TOTAL CA: CPT

## 2023-04-02 PROCEDURE — 2060000000 HC ICU INTERMEDIATE R&B

## 2023-04-02 PROCEDURE — 6370000000 HC RX 637 (ALT 250 FOR IP): Performed by: INTERNAL MEDICINE

## 2023-04-02 RX ORDER — CLOTRIMAZOLE AND BETAMETHASONE DIPROPIONATE 10; .5 MG/ML; MG/ML
LOTION TOPICAL 2 TIMES DAILY
Status: DISCONTINUED | OUTPATIENT
Start: 2023-04-02 | End: 2023-04-02

## 2023-04-02 RX ADMIN — SACUBITRIL AND VALSARTAN 1 TABLET: 49; 51 TABLET, FILM COATED ORAL at 09:21

## 2023-04-02 RX ADMIN — PANTOPRAZOLE SODIUM 40 MG: 40 TABLET, DELAYED RELEASE ORAL at 05:34

## 2023-04-02 RX ADMIN — SACUBITRIL AND VALSARTAN 1 TABLET: 49; 51 TABLET, FILM COATED ORAL at 20:41

## 2023-04-02 RX ADMIN — SODIUM CHLORIDE, PRESERVATIVE FREE 10 ML: 5 INJECTION INTRAVENOUS at 20:41

## 2023-04-02 RX ADMIN — CETIRIZINE HYDROCHLORIDE 5 MG: 10 TABLET, FILM COATED ORAL at 09:21

## 2023-04-02 RX ADMIN — ASPIRIN 81 MG: 81 TABLET, COATED ORAL at 09:21

## 2023-04-02 RX ADMIN — ATORVASTATIN CALCIUM 40 MG: 40 TABLET, FILM COATED ORAL at 20:41

## 2023-04-02 RX ADMIN — FOLIC ACID 1 MG: 1 TABLET ORAL at 09:21

## 2023-04-02 RX ADMIN — METFORMIN HYDROCHLORIDE 500 MG: 500 TABLET ORAL at 17:05

## 2023-04-02 RX ADMIN — LEVOTHYROXINE SODIUM 88 MCG: 0.09 TABLET ORAL at 05:34

## 2023-04-02 RX ADMIN — SERTRALINE HYDROCHLORIDE 50 MG: 50 TABLET ORAL at 09:21

## 2023-04-02 RX ADMIN — CLOPIDOGREL BISULFATE 75 MG: 75 TABLET ORAL at 09:21

## 2023-04-02 RX ADMIN — PETROLATUM: 420 OINTMENT TOPICAL at 09:22

## 2023-04-02 RX ADMIN — Medication 2000 UNITS: at 09:21

## 2023-04-02 RX ADMIN — METOPROLOL TARTRATE 50 MG: 50 TABLET, FILM COATED ORAL at 09:21

## 2023-04-02 RX ADMIN — FERROUS SULFATE TAB 325 MG (65 MG ELEMENTAL FE) 325 MG: 325 (65 FE) TAB at 09:22

## 2023-04-02 RX ADMIN — METOPROLOL TARTRATE 50 MG: 50 TABLET, FILM COATED ORAL at 20:41

## 2023-04-02 RX ADMIN — METFORMIN HYDROCHLORIDE 500 MG: 500 TABLET ORAL at 09:21

## 2023-04-02 ASSESSMENT — PAIN SCALES - GENERAL
PAINLEVEL_OUTOF10: 0

## 2023-04-03 VITALS
BODY MASS INDEX: 28.52 KG/M2 | HEART RATE: 65 BPM | OXYGEN SATURATION: 98 % | RESPIRATION RATE: 18 BRPM | WEIGHT: 199.2 LBS | HEIGHT: 70 IN | TEMPERATURE: 97.6 F | SYSTOLIC BLOOD PRESSURE: 128 MMHG | DIASTOLIC BLOOD PRESSURE: 57 MMHG

## 2023-04-03 LAB
ALBUMIN SERPL-MCNC: 2.6 G/DL (ref 3.5–5.2)
ALP SERPL-CCNC: 578 U/L (ref 40–129)
ALT SERPL-CCNC: 35 U/L (ref 0–40)
ANION GAP SERPL CALCULATED.3IONS-SCNC: 8 MMOL/L (ref 7–16)
AST SERPL-CCNC: 43 U/L (ref 0–39)
BASOPHILS # BLD: 0.04 E9/L (ref 0–0.2)
BASOPHILS NFR BLD: 0.4 % (ref 0–2)
BILIRUB DIRECT SERPL-MCNC: <0.2 MG/DL (ref 0–0.3)
BILIRUB INDIRECT SERPL-MCNC: ABNORMAL MG/DL (ref 0–1)
BILIRUB SERPL-MCNC: 0.4 MG/DL (ref 0–1.2)
BUN SERPL-MCNC: 25 MG/DL (ref 6–23)
CALCIUM SERPL-MCNC: 7.7 MG/DL (ref 8.6–10.2)
CHLORIDE SERPL-SCNC: 110 MMOL/L (ref 98–107)
CO2 SERPL-SCNC: 22 MMOL/L (ref 22–29)
CREAT SERPL-MCNC: 1.3 MG/DL (ref 0.7–1.2)
EOSINOPHIL # BLD: 0.72 E9/L (ref 0.05–0.5)
EOSINOPHIL NFR BLD: 7.2 % (ref 0–6)
ERYTHROCYTE [DISTWIDTH] IN BLOOD BY AUTOMATED COUNT: 15.5 FL (ref 11.5–15)
GLUCOSE SERPL-MCNC: 207 MG/DL (ref 74–99)
HCT VFR BLD AUTO: 37 % (ref 37–54)
HGB BLD-MCNC: 11.2 G/DL (ref 12.5–16.5)
IMM GRANULOCYTES # BLD: 0.06 E9/L
IMM GRANULOCYTES NFR BLD: 0.6 % (ref 0–5)
LYMPHOCYTES # BLD: 1.17 E9/L (ref 1.5–4)
LYMPHOCYTES NFR BLD: 11.7 % (ref 20–42)
MCH RBC QN AUTO: 30.9 PG (ref 26–35)
MCHC RBC AUTO-ENTMCNC: 30.3 % (ref 32–34.5)
MCV RBC AUTO: 102.2 FL (ref 80–99.9)
METER GLUCOSE: 142 MG/DL (ref 74–99)
METER GLUCOSE: 190 MG/DL (ref 74–99)
MONOCYTES # BLD: 0.69 E9/L (ref 0.1–0.95)
MONOCYTES NFR BLD: 6.9 % (ref 2–12)
NEUTROPHILS # BLD: 7.29 E9/L (ref 1.8–7.3)
NEUTS SEG NFR BLD: 73.2 % (ref 43–80)
PLATELET # BLD AUTO: 130 E9/L (ref 130–450)
PMV BLD AUTO: 14.4 FL (ref 7–12)
POTASSIUM SERPL-SCNC: 4.2 MMOL/L (ref 3.5–5)
PROT SERPL-MCNC: 4.8 G/DL (ref 6.4–8.3)
RBC # BLD AUTO: 3.62 E12/L (ref 3.8–5.8)
SARS-COV-2 RDRP RESP QL NAA+PROBE: DETECTED
SODIUM SERPL-SCNC: 140 MMOL/L (ref 132–146)
WBC # BLD: 10 E9/L (ref 4.5–11.5)

## 2023-04-03 PROCEDURE — 92526 ORAL FUNCTION THERAPY: CPT

## 2023-04-03 PROCEDURE — 97535 SELF CARE MNGMENT TRAINING: CPT

## 2023-04-03 PROCEDURE — 80076 HEPATIC FUNCTION PANEL: CPT

## 2023-04-03 PROCEDURE — 87635 SARS-COV-2 COVID-19 AMP PRB: CPT

## 2023-04-03 PROCEDURE — 97530 THERAPEUTIC ACTIVITIES: CPT

## 2023-04-03 PROCEDURE — 6370000000 HC RX 637 (ALT 250 FOR IP): Performed by: INTERNAL MEDICINE

## 2023-04-03 PROCEDURE — 85025 COMPLETE CBC W/AUTO DIFF WBC: CPT

## 2023-04-03 PROCEDURE — 36415 COLL VENOUS BLD VENIPUNCTURE: CPT

## 2023-04-03 PROCEDURE — 82962 GLUCOSE BLOOD TEST: CPT

## 2023-04-03 PROCEDURE — 80048 BASIC METABOLIC PNL TOTAL CA: CPT

## 2023-04-03 RX ADMIN — LEVOTHYROXINE SODIUM 88 MCG: 0.09 TABLET ORAL at 06:18

## 2023-04-03 RX ADMIN — ASPIRIN 81 MG: 81 TABLET, COATED ORAL at 09:11

## 2023-04-03 RX ADMIN — Medication 2000 UNITS: at 09:11

## 2023-04-03 RX ADMIN — FERROUS SULFATE TAB 325 MG (65 MG ELEMENTAL FE) 325 MG: 325 (65 FE) TAB at 09:11

## 2023-04-03 RX ADMIN — PETROLATUM: 420 OINTMENT TOPICAL at 09:12

## 2023-04-03 RX ADMIN — FOLIC ACID 1 MG: 1 TABLET ORAL at 09:11

## 2023-04-03 RX ADMIN — CLOPIDOGREL BISULFATE 75 MG: 75 TABLET ORAL at 09:11

## 2023-04-03 RX ADMIN — METOPROLOL TARTRATE 50 MG: 50 TABLET, FILM COATED ORAL at 09:11

## 2023-04-03 RX ADMIN — PANTOPRAZOLE SODIUM 40 MG: 40 TABLET, DELAYED RELEASE ORAL at 06:18

## 2023-04-03 RX ADMIN — METFORMIN HYDROCHLORIDE 500 MG: 500 TABLET ORAL at 09:12

## 2023-04-03 RX ADMIN — SERTRALINE HYDROCHLORIDE 50 MG: 50 TABLET ORAL at 09:11

## 2023-04-03 RX ADMIN — CETIRIZINE HYDROCHLORIDE 5 MG: 10 TABLET, FILM COATED ORAL at 09:11

## 2023-04-03 RX ADMIN — MICONAZOLE NITRATE: 20 CREAM TOPICAL at 09:12

## 2023-04-03 RX ADMIN — SACUBITRIL AND VALSARTAN 1 TABLET: 49; 51 TABLET, FILM COATED ORAL at 09:12

## 2023-04-03 ASSESSMENT — PAIN SCALES - GENERAL: PAINLEVEL_OUTOF10: 0

## 2023-04-03 NOTE — PROGRESS NOTES
Call to Maribel Trevino placed to notify him of critical chloride. No new orders.
Critical result called to attending. No new orders at this time.
Dangelo cath removed per unit manager request.
Database initiated pharmacy and medications verified with the patients daughter. He is alert to self only at the moment and daughter cares for him. She states he's normally eats well but he has been having trouble swallowing and she is afraid he's aspirating. He uses a walker and wheelchair.
Dr. Arelis Suresh MD,    Your patient is on a medication that requires a renal dose adjustment. Renal Function Assessment:    Date Body Weight IBW  Adjusted BW SCr  CrCl Dialysis status   3/30/2023 148 lb 8 oz (67.4 kg)  Ideal body weight: 73 kg (160 lb 15 oz) Serum creatinine: 1.9 mg/dL (H) 03/30/23 0352  Estimated creatinine clearance: 28 mL/min (A) N/a       Pharmacy has renally dose-adjusted the following medication(s):    Date Original Order Renally Adjusted Order   3/30/2023 Cipro 500mg PO q12H Cipro 250 mg PO q12H       These changes were made per protocol according to the [facility] Renal Function-Based Dose Adjustments Policy    *Please note this dose may need readjusted if your patient's renal function significantly improves. Please contact pharmacy with any questions regarding these changes.     KATHIE [facility]  3/30/2023  3:06 PM
Dr. Argenis Don said ok to remove rios for voiding trial.Per Dr. Shad Reich residual more than 150 cc insert rios\".
Dr. Stephanie Gonzales notified through [facility] of Procalcitonin level. New orders received for Flagyl 500mg po TID and Cipro 500mg po BID.
GENERAL SURGERY  DAILY PROGRESS NOTE  3/30/2023    Chief Complaint   Patient presents with    Hypotension     Pt family called EMS due to pts blood pressure being low. 113/50    Extremity Weakness       Subjective:  Sleepy on exam. No abdominal pain, n/v    Objective:  BP (!) 141/65   Pulse 58   Temp 97.9 °F (36.6 °C) (Oral)   Resp 15   Ht 5' 10\" (1.778 m)   Wt 148 lb (67.1 kg)   SpO2 95%   BMI 21.24 kg/m²     General Appearance: Awake, confused  Skin: Warm, dry  Head/face:  NCAT  Eyes:  No gross abnormalities. , PERRL, and EOMI  Lungs: No increased work of breathing  Heart:  Heart regular rate and rhythm  Abdomen: Soft nondistended nontender. Negative Blunt sign. No rebound or guarding  Musculoskeletal: pulses present in all extremities    RUQ US: cholelithiasis without evidence of cholelithiasis    Na: 164  T bili 0.2   WBC 13.5  Assessment/Plan:  78 y.o. male with cholelithiasis without evidence of acute cholecystitis     - no acute surgical intervention at this time  - consider abx for leukocytosis of unknown etiology; unlikely to be related to his gallbladder as he as remained asymptomatic with imaging showing no acute inflammation     Will discuss with Dr. Roger Cobian     Electronically signed by Violet Steiner MD on 3/30/2023 at 5:07 AM      I saw and examined the patient. I reviewed the above resident's note. I reviewed all labs, radiology, and all test results listed above. I agree with the assessment and plan as outlined. Diet as tolerated  US images reviewed - asymptomatic cholelithiasis; no need for surgical intervention. Low index of suspicion for source of sepsis.     Roger Cobian MD  General Surgery
Initial Inpatient Wound Care    Admit Date: 3/28/2023 12:24 PM    Reason for consult:  two small open areas on middle of buttocks    Significant history:      Wound history:  POA    Findings:  daughter present. Patient very [address]      Open area coccyx-to right and left inner buttocks small superficial open area.  redness    Plan:lo air loss, ostomy powder and aquaphor  dietician      Danielle Cat RN 3/29/2023 12:32 PM
Message sent to Dr. Stephanie Gonzales re: Raghu Price script. Message read with no reply. Call placed to Dr. Stephanie Gonzales office re: Raghu Price script. Per office, he is to be reached through [address] [place] [address]. Follow up message sent via [employer] re: norco script. Patient originally from home and now being discharged to a facility, facility requires a script even though the prescription is chronic. Per Dr. Stephanie Gonzales, scripts are only given for new medications and patient is chronically on Norco so therefore he will not send patient with a script. Daughter instructed to take patients home medication [place] to facility.
New consult called to ID answering service.
Nurse to nurse report called to [address]
[employer] Message sent to Dr. Ger Cain  to notify him of critical sodium and chloride levels.
[employer] message sent to Dr. Rossy Butcher to notify him of Critical sodium and chloride levels.
SPEECH LANGUAGE PATHOLOGY  DAILY PROGRESS NOTE        PATIENT NAME:  Liza Gudino      :  1940          TODAY'S DATE:  3/31/2023 ROOM:  30/30-A        SWALLOWING    Pt seen at bedside for dysphagia management. Daughter present. Reports good toelration of downgraded diet. Dysphagia exercise program initiated yesterday. Pt reports he did not self complete. Daughter reports pt sleeping a lot. Handouts left bedside. DYSPHAGIA DIAGNOSIS:  moderate oropharyngeal phase dysphagia      DIET RECOMMENDATIONS:  Puree with honey consistency (moderately thick - IDDSI level 3)  liquids      MEDICATION ADMINISTRATION, and Administer medication crushed, as able, with pudding/applesauce     FEEDING RECOMMENDATIONS:              Assistance level:  Set-up is required for all oral intake                Compensatory strategies recommended: Small bites/sips and Alternate solids and liquids, no straw             Education- Pt educated on results and POC. Pt trained on compensatory strategies for safe swallow with good outcome. Questions answered. Plan  Cont POC           [facility],[address] A. CCC/SLP [id number]  Speech Pathologist              CPT code(s) 66539  dysphagia tx    Total minutes :  15 minutes
SPEECH LANGUAGE PATHOLOGY  DAILY PROGRESS NOTE        PATIENT NAME:  Salinas Wei      :  1940          TODAY'S DATE:  3/30/2023 ROOM:  30/0630-A        SWALLOWING    Pt seen at bedside for dysphagia management. Daughter present. Reports difficultly with mastication of current diet, good toleration of thickened liquids. Reviewed results of MBS and POC. Educated on recommended comp strats and handout provided. Dysphagia exercise program initiated including OM, TBR and laryngeal exercises. Handouts left bedside. Recommend downgrade to puree diet at this time. Daughter agreed to downgrade. RN made aware/     DYSPHAGIA DIAGNOSIS:  moderate oropharyngeal phase dysphagia      DIET RECOMMENDATIONS:  Puree  with  honey consistency (moderately thick - IDDSI level 3)  liquids      MEDICATION ADMINISTRATION, and Administer medication crushed, as able, with pudding/applesauce     FEEDING RECOMMENDATIONS:              Assistance level:  Set-up is required for all oral intake                Compensatory strategies recommended: Small bites/sips and Alternate solids and liquids, no straw             Education- Pt educated on results and POC. Pt trained on compensatory strategies for safe swallow with good outcome. Questions answered. Plan  Recommend downgrade to puree diet, continue honey thick liquids. Karo SCHAEFER CCC/SLP [id number]  Speech Pathologist              CPT code(s) 74103  dysphagia tx  [id number]  oral motor exercises (15 min)  Total minutes :  30 minutes
SPEECH LANGUAGE PATHOLOGY  DAILY PROGRESS NOTE        PATIENT NAME:  Светлана Meraz      :  1940          TODAY'S DATE:  4/3/2023 ROOM:  30/0630-A      SWALLOWING    Pt seen at bedside for dysphagia management. Pt's nurse reported no swallow issues noted on current modified diet. Pt's daughter present. Pt's daughter reports good toleration of downgraded diet. Pt not appropriate for PO trials or dysphagia exercise program this session due to pt fatigue. Pt reportedly d'cing to another facility for rehab this date-SLP reviewed current diet recs w/ pt and pt's daughter, comp swallow strats with PO intake, rec for assist with PO intake, and rec for ST upon d/c; no questions voiced. Pt [place] and SLP attempted use of increased speaker volume to assist with pt hearing edu provided. Pt left in room w/ daughter following session. DYSPHAGIA DIAGNOSIS:  moderate oropharyngeal phase dysphagia      DIET RECOMMENDATIONS:  Puree with honey consistency (moderately thick - IDDSI level 3)  liquids      MEDICATION ADMINISTRATION, and Administer medication crushed, as able, with pudding/applesauce     FEEDING RECOMMENDATIONS:              Assistance level:  Set-up is required for all oral intake                Compensatory strategies recommended: Small bites/sips and Alternate solids and liquids, no straw           Education- Pt educated on results and POC. Pt trained on compensatory strategies for safe swallow with good outcome. Questions answered. Plan  Rec continued ST upon d/c; SLP reviewed continued diet recs of puree and honey thick liquids, rec for assist with PO intake, rec for ST upon d/c w/ pt's nurse; no questions voiced.          CPT code(s) 28205  dysphagia tx    Total minutes :  10 minutes
Secure text message sent to Dr. Ivette Maxwell re: Antifungal cream for groin. Awaiting call back/ new orders.
Subjective:    Chief complaint:    Awake confused  No family by the bed side    Objective:    BP (!) 133/55   Pulse 56   Temp 97.7 °F (36.5 °C) (Oral)   Resp 18   Ht 5' 10\" (1.778 m)   Wt 199 lb 3.2 oz (90.4 kg)   SpO2 94%   BMI 28.58 kg/m²   General : Awake, confused pleasantly  Heart:  RRR, no murmurs, gallops, or rubs. Lungs:  CTA bilaterally, no wheeze, rales or rhonchi  Abd: bowel sounds present, nontender, nondistended, no masses  Extrem:  No clubbing, cyanosis, or edema    CBC:   Lab Results   Component Value Date/Time    WBC 11.4 04/02/2023 05:49 AM    RBC 3.87 04/02/2023 05:49 AM    HGB 12.0 04/02/2023 05:49 AM    HCT 40.0 04/02/2023 05:49 AM    .4 04/02/2023 05:49 AM    MCH 31.0 04/02/2023 05:49 AM    MCHC 30.0 04/02/2023 05:49 AM    RDW 15.7 04/02/2023 05:49 AM     04/02/2023 05:49 AM    MPV 14.7 04/02/2023 05:49 AM     BMP:    Lab Results   Component Value Date/Time     03/31/2023 04:10 AM    K 4.6 03/31/2023 04:10 AM    K 5.0 11/22/2022 11:07 AM     03/31/2023 04:10 AM    CO2 22 03/31/2023 04:10 AM    BUN 61 03/31/2023 04:10 AM    LABALBU 2.9 04/02/2023 05:49 AM    LABALBU 4.2 09/07/2011 10:06 AM    CREATININE 1.7 03/31/2023 04:10 AM    CALCIUM 8.0 03/31/2023 04:10 AM    GFRAA 50 09/13/2022 10:45 AM    LABGLOM 39 03/31/2023 04:10 AM    LABGLOM 42 02/06/2023 12:00 AM    LABGLOM 42 02/06/2023 12:00 AM    GLUCOSE 188 03/31/2023 04:10 AM    GLUCOSE 121 09/07/2011 10:06 AM     PT/INR:    Lab Results   Component Value Date/Time    PROTIME 11.0 07/18/2014 01:15 PM    INR 1.0 07/18/2014 01:15 PM     Troponin:  No results found for: TROPONINI    No results for input(s): LABURIN in the last 72 hours. No results for input(s): BC in the last 72 hours. No results for input(s): Valerie Naylor in the last 72 hours.         Current Facility-Administered Medications:     pantoprazole (PROTONIX) tablet 40 mg, 40 mg, Oral, JESSIKA AC, Mary Engel MD, 40 mg at 04/02/23 3525
Daily  ferrous sulfate (IRON 325) tablet 325 mg, Daily  cetirizine (ZYRTEC) tablet 5 mg, Daily  insulin lispro (HUMALOG) injection vial 0-8 Units, TID WC  insulin lispro (HUMALOG) injection vial 0-4 Units, Nightly  dextrose bolus 10% 125 mL, PRN   Or  dextrose bolus 10% 250 mL, PRN  glucagon injection 1 mg, PRN  dextrose 10 % infusion, Continuous PRN  Glucose (TRUEPLUS) oral gel 15 g, PRN       Data Review  CBC:   Lab Results   Component Value Date/Time    WBC 10.4 04/01/2023 04:00 AM    RBC 3.74 04/01/2023 04:00 AM    HGB 11.8 04/01/2023 04:00 AM    HCT 39.0 04/01/2023 04:00 AM    .3 04/01/2023 04:00 AM    MCH 31.6 04/01/2023 04:00 AM    MCHC 30.3 04/01/2023 04:00 AM    RDW 16.1 04/01/2023 04:00 AM     04/01/2023 04:00 AM    MPV 14.4 04/01/2023 04:00 AM     CMP:    Lab Results   Component Value Date/Time     03/31/2023 04:10 AM    K 4.6 03/31/2023 04:10 AM    K 5.0 11/22/2022 11:07 AM     03/31/2023 04:10 AM    CO2 22 03/31/2023 04:10 AM    BUN 61 03/31/2023 04:10 AM    CREATININE 1.7 03/31/2023 04:10 AM    GFRAA 50 09/13/2022 10:45 AM    LABGLOM 39 03/31/2023 04:10 AM    LABGLOM 42 02/06/2023 12:00 AM    LABGLOM 42 02/06/2023 12:00 AM    GLUCOSE 188 03/31/2023 04:10 AM    GLUCOSE 121 09/07/2011 10:06 AM    PROT 5.7 04/01/2023 04:00 AM    LABALBU 2.7 04/01/2023 04:00 AM    LABALBU 4.2 09/07/2011 10:06 AM    CALCIUM 8.0 03/31/2023 04:10 AM    BILITOT 0.3 04/01/2023 04:00 AM    ALKPHOS 572 04/01/2023 04:00 AM    AST 47 04/01/2023 04:00 AM    ALT 41 04/01/2023 04:00 AM     Hepatic Function Panel:    Lab Results   Component Value Date/Time    ALKPHOS 572 04/01/2023 04:00 AM    ALT 41 04/01/2023 04:00 AM    AST 47 04/01/2023 04:00 AM    PROT 5.7 04/01/2023 04:00 AM    BILITOT 0.3 04/01/2023 04:00 AM    BILIDIR <0.2 04/01/2023 04:00 AM    IBILI see below 04/01/2023 04:00 AM    LABALBU 2.7 04/01/2023 04:00 AM    LABALBU 4.2 09/07/2011 10:06 AM     No components found for: CHLPL    Lab Results
Supplement    Anthropometric Measures:  Height: 5' 10\" (177.8 cm)  Ideal Body Weight (IBW): 166 lbs (75 kg)    Admission Body Weight:  (144# stated 3/28)  Current Body Weight: 148 lb (67.1 kg), 89.2 % IBW.  Weight Source: Bed Scale (3/29)  Current BMI (kg/m2): 21.2  Usual Body Weight: 172 lb (78 kg) (Per EMR-bed scale x 6 mos)  % Weight Change (Calculated): -14  Weight Adjustment For: No Adjustment                 BMI Categories: Underweight (BMI less than 22) age over 72    Estimated Daily Nutrient Needs:  Energy Requirements Based On: Formula     Energy (kcal/day): 1053-3041  Weight Used for Protein Requirements: Current  Protein (g/day): 80-90  Method Used for Fluid Requirements: Other (Comment)  Fluid (ml/day): per renal mgmt    Nutrition Diagnosis:   Severe malnutrition, In context of chronic illness related to cognitive or neurological impairment, swallowing difficulty (aspiration risk) as evidenced by Criteria as identified in malnutrition assessment    Nutrition Interventions:   Food and/or Nutrient Delivery: Continue Current Diet, Start Oral Nutrition Supplement (added frozen ONS as discussed w/daughter tooptimize nutrition.)  Nutrition Education/Counseling: Education completed (discussed diet from SLP w/ismael who was already familiar with it and thickener.)  Coordination of Nutrition Care: Continue to monitor while inpatient, Speech Therapy (monitor w/SLP diet recs.)       Goals:     Goals: Meet at least 75% of estimated needs, by next RD assessment       Nutrition Monitoring and Evaluation:      Food/Nutrient Intake Outcomes: Food and Nutrient Intake, Supplement Intake, Diet Advancement/Tolerance  Physical Signs/Symptoms Outcomes: Chewing or Swallowing, Biochemical Data, Weight, Skin, Nutrition Focused Physical Findings, Fluid Status or Edema    Discharge Planning:    Continue Oral Nutrition Supplement, Continue current diet     Silverio Sen RD,LD  Contact: [phone]
facilitate/challenge dynamic balance, stand tolerance for increased safety and independence with ADLs  * Therapeutic activities to facilitate gross/fine motor skills for increased independence with ADLs  * Neuro-muscular re-education: facilitation of righting/equilibrium reactions, midline orientation, scapular stability/mobility, normalization of muscle tone, and facilitation of volitional active controled movement    Recommended Adaptive Equipment: TBD     Home Living: Patient lives with his daughter in a 2-floor home with 3 steps and 1 rail to enter; patient's bedroom and bathroom are on the first floor. Bathroom Setup: tub/shower with shower chair  Equipment Owned: FWW, cane, w/c    Prior Level of Function (PLOF): Per patient, patient was able to complete ADLs but had his daughter assist with socks and shoes, his daughter completes IADLs, and he was mod I with functional mobility (used FWW or w/c) prior to this hospitalization. Patient is a questionable historian, unsure of accuracy of provided information  Driving: no    Pain Level: Patient reports no pain   Cognition: Patient alert, pleasant, cooperative, able to follow simple directions  Memory: fair-  Sequencing: fair-  Problem Solving: fair-  Judgement/Safety: fair-    Functional Assessment:  AM-PAC Daily Activity Raw Score: 15/24   Initial Eval Status  Date: 3/29/2023 Treatment Status  Date:  Short Term Goals = Long Term Goals   Feeding Setup assistance  Mod I   Grooming MIN A, seated  Mod I   UB Dressing MIN A  Mod I    LB Dressing MAX A for socks  MIN A - with use of AE, as needed/appropriate   Bathing MOD A  MIN A - with use of AE/DME, as needed/appropriate   Toileting MOD A, catheter  MIN A   Bed Mobility  Supine-to-Sit: MIN A to lift trunk to midline  Sit-to-Supine: MIN A to lift BLE into bed   Mod I in order to maximize patient's independence/participation with ADLs and other functional tasks.    Functional Transfers Sit-to-Stand: MIN A   from EOB,
feet with ww with min to mod assist  75 feet with ww with CGA   Stair Negotiation  Ascended and descended  NT      LE strength     3+/5    4/5   balance      fair     AM-PAC Raw score               15/24         Pt is alert and grossly oriented  LE ROM: WFL  Sensation: intact  Edema: none  Endurance: fair  Chair alarm: yes     ASSESSMENT:    Pt displays functional ability as noted in the objective portion of this evaluation. Patient education  Pt educated on PT objectives    Patient response to education:   Pt verbalized understanding Pt demonstrated skill Pt requires further education in this area   yes           Comments:  Pt sat up to edge of bed. SBA but with increased time. No c/o dizziness. Pt instructed on hand placement and transfers technique. Upon standing noted that pt was incontinent of bowel. After hygiene performed pt needed to sit back down to rest.  Pt then able to ambulated with ww in room. Min assist for straight path, mod assist on turns. R leg casper at times. Pt  up in chair at end of session with chair alarm on and call light    Pt's/ family goals   1. To get stronger    Conditions Requiring Skilled Therapeutic Intervention:    [x]Decreased strength     []Decreased ROM  [x]Decreased functional mobility  [x]Decreased balance   [x]Decreased endurance   []Decreased posture  []Decreased sensation  []Decreased coordination   []Decreased vision  []Decreased safety awareness   []Increased pain       Patient and or family understand(s) diagnosis, prognosis, and plan of care.     Prognosis is good for reaching above PT goals    PHYSICAL THERAPY PLAN OF CARE:    PT POC is established based on physician order and patient diagnosis     Referring provider/PT Order: Reina Wallis MD/ PT eval and treat      Current Treatment Recommendations:     [x] Strengthening to improve independence with functional mobility   [] ROM to improve independence with functional mobility   [x] Balance Training to
(H) 02/15/2022       Lab Results   Component Value Date    HDL 40 09/13/2022    HDL 43 05/18/2022    HDL 41 02/15/2022       Lab Results   Component Value Date    LDLCALC 98 09/13/2022    LDLCALC 90 05/18/2022    LDLCALC 86 02/15/2022       Lab Results   Component Value Date    LABVLDL 40 09/13/2022    LABVLDL 42 05/18/2022    LABVLDL 39 02/15/2022      PT/INR:    Lab Results   Component Value Date/Time    PROTIME 11.0 07/18/2014 01:15 PM    INR 1.0 07/18/2014 01:15 PM     IRON:    Lab Results   Component Value Date/Time    IRON 33 02/22/2022 12:00 PM     Iron Saturation:  No components found for: PERCENTFE  FERRITIN:    Lab Results   Component Value Date/Time    FERRITIN 21 02/22/2022 12:00 PM         Assessment:     Active Problems:  Cholelithiasis  Hx ERCP in 2014  Pneumobilia, 2/2 to above  Elevated ALP  HEENA- defer  Hypernatremia- defer   Cecal AVM's  Diverticulosis     Plan:   Alkaline phosphatase isoenzymes pending   Diet as per SLP recommendations  Surgery input noted   BC negative  ID input noted   IVF per admitting   Supportive care  Discharge when medically stable per primary, ok from GI POV       Note: This report was completed utilizing computer voice recognition software. Every effort has been made to ensure accuracy, however; inadvertent computerized transcription errors may be present.      Discussed with Dr. Alayne Angelucci per Dr. Cristobal SINGH-CELESTE 4/2/2023  10:36 AM For Dr. Kaylynn Self
04/03/23 0911    cetirizine (ZYRTEC) tablet 5 mg, 5 mg, Oral, Daily, Mary Engel MD, 5 mg at 04/03/23 0911    insulin lispro (HUMALOG) injection vial 0-8 Units, 0-8 Units, SubCUTAneous, TID WC, Mary Engel MD, 4 Units at 03/31/23 1224    insulin lispro (HUMALOG) injection vial 0-4 Units, 0-4 Units, SubCUTAneous, Nightly, Dann Gunter MD    dextrose bolus 10% 125 mL, 125 mL, IntraVENous, PRN **OR** dextrose bolus 10% 250 mL, 250 mL, IntraVENous, PRN, Dann Gunter MD    glucagon injection 1 mg, 1 mg, SubCUTAneous, PRN, Dann Gunter MD    dextrose 10 % infusion, , IntraVENous, Continuous PRN, Dann Gunter MD    Glucose (TRUEPLUS) oral gel 15 g, 15 g, Oral, PRN, Mary Engel MD    ADULT ORAL NUTRITION SUPPLEMENT; Lunch, Dinner; Frozen Oral Supplement  ADULT DIET; Dysphagia - Pureed; Moderately Thick (Honey); No Drinking Straws    FL MODIFIED BARIUM SWALLOW W VIDEO   Final Result   Positive penetration and aspiration to thin consistencies of barium contrast.      Presence of deep penetration to nectar consistencies of barium contrast.      Please see separate speech pathology report for full discussion of findings   and recommendations. RECOMMENDATIONS:   Unavailable         US GALLBLADDER RUQ   Final Result   Cholelithiasis without ultrasound evidence of acute cholecystitis. Common   bile duct is prominent at 6-7 mm. CT ABDOMEN PELVIS WO CONTRAST   Final Result   Left lower lobe atelectasis or pneumonitis. Cholelithiasis but no CT evidence of acute cholecystitis. Pneumobilia is   presumably due to previous ERCP. Diverticulosis but no evidence of diverticulitis. Prostate enlargement. Distal abdominal aortic aneurysm. Follow-up imaging every 3 years is   recommended. XR CHEST PORTABLE   Final Result   No acute process. CT HEAD WO CONTRAST   Final Result   1. There is no acute intracranial abnormality.
Sit-to-Stand: MIN A   from EOB, cues for hand placement, 3 stands with 2-3 attempts for each stand Mod A from bed surface. Cues for hand placement to increase safety technique. SBA   Functional Mobility MIN A (FWW) side stepping to foot and head of bed SPT to chair with [address] mod A SBA with functional mobility (with device, as needed/appropriate) in order to maximize independence with ADLs and other functional tasks. Balance Sitting: SBA (EOB)  Standing: CGA (FWW), stands up to 4 minutes at a time Stand balance mod A with WW   SBA dynamic standing balance during completion of ADLs and other functional tasks. Activity Tolerance fair Fair-   Patient will demonstrate Good understanding and consistent implementation of energy conservation techniques and work simplification techniques into ADLs and functional tasks   Visual/  Perceptual WFL, glasses      N/A   B UE Strength 4-/5 within active range   Patient will demonstrate 4/5 B UE strength in order to maximize independence with ADLs and functional transfers. Comments:  Pt cleared with nursing and agreeable to therapy. Daughter present, patient tired this AM. Patient fatigued with minimal exertion, encouragement provided to stay OOB. Remained seated in the chair with alarm activated. Education/treatment:  ADL retraining with facilitation of movement to increase self care skills. Therapeutic activity to address balance and endurance for ADL and transfers. Pt education of walker safety, and transfer safety. Pt has made  progress towards set goals.        Time In: 10:37  Time Out: 11:04     Min Units   Therapeutic Ex 07117     Therapeutic Activities 80342 10 1   ADL/Self Care 41038 17 1   Orthotic Management 24163     Neuro Re-Ed 17269     Non-Billable Time     TOTAL TIMED TREATMENT 27 [place] 27 GLORIA/L [id number]
murmurs appreciated. Abdomen: Positive bowel sounds to auscultation. Benign to palpation. No masses felt. No hepatosplenomegaly. Small decub wound with no sign of infection. Genitourinary: external cathter in place  Extremities: No clubbing, no cyanosis, no edema. Musculoskeletal: no gross focal abnormalities  Neurological: alert. Oriented x 3  Lines: peripheral    Labs, cultures reviewed  Radiology reviewed      Assessment:  Abnormal labs: Electrolytes are better today. Creatinine is improving .,  elevated BNP, LFTs. Improving   Leucocytosis: no sign of pneumonia, no fevers UA is clean. There is small decub wound with no sign of infection. There is cholelithiasis but no sign of cholecystitis. Blood cx have been negative. Plan:    Watch him off antibiotics. ID signs of. Please call me if there are any questions. Discussed with Dr. Brianne Hathaway.       Electronically signed by Syd Ricci MD on 4/2/2023 at 3:19 PM
CHEST PORTABLE   Final Result   No acute process. CT HEAD WO CONTRAST   Final Result   1. There is no acute intracranial abnormality. Specifically, there is no   intracranial hemorrhage. 2. Atrophy and periventricular leukomalacia,   . Assessment:    Principal Problem:    Acute kidney injury superimposed on chronic kidney disease ([facility] [address]  Active Problems:    Severe protein-calorie malnutrition ([facility] [address]  Resolved Problems:    * No resolved hospital problems. *      Plan:  Elevated procalcitonin  On empiric antibiotics  GI to see  Discussed with daughter      Mary Engel MD  1:19 PM  3/31/2023    NOTE: This report was transcribed using voice recognition software.  Every effort was made to ensure accuracy; however, inadvertent transcription errors may be present
Clarissa Median FOZIA/L [id number]
PORTABLE   Final Result   No acute process. CT HEAD WO CONTRAST   Final Result   1. There is no acute intracranial abnormality. Specifically, there is no   intracranial hemorrhage. 2. Atrophy and periventricular leukomalacia,   . Assessment:    Principal Problem:    Acute kidney injury superimposed on chronic kidney disease ([facility] [address]  Active Problems:    Severe protein-calorie malnutrition ([facility] [address]  Resolved Problems:    * No resolved hospital problems. *      Plan:  Elevated procalcitonin  GI input noted  Will trend procalcitonin  Ask ID to see  Increase activity  Discharge planning  Change IV fluids given hypernatremia  Would like to see p.o. intake improved before discharge    Ane Boast, MD  10:51 AM  4/1/2023    NOTE: This report was transcribed using voice recognition software.  Every effort was made to ensure accuracy; however, inadvertent transcription errors may be present
Vallecula/Pharyngeal Wall           Reduced tongue base retraction resulting in residuals in vallecula and/or posterior pharyngeal wall for all consistencies administered which mostly cleared with spontaneous double swallow and liquid chaser       Pyriform Sinuses      No significant residuals were noted in the pyriform sinuses     LARYNGEAL PENETRATION   Laryngeal penetration occurred prior to aspiration. Further details under aspiration section. Laryngeal penetration occurred in the absence of aspiration DURING the swallow for nectar consistency liquid due to  delayed laryngeal closure which penetrated deep into the laryngeal vestibule (to the level of the true vocal folds). Laryngeal penetration was moderate-severe and occurred consistently   an absent cough/throat clear was noted    ASPIRATION  Aspiration occurred DURING the swallow for thin liquid due to  delayed laryngeal closure . Aspiration was moderate-severe and occurred consistently . an absent cough/throat clear was noted    PENETRATION-ASPIRATION SCALE (PAS):  THIN 8 = Material enters the airway, passes below the vocal folds, and no effort is made to eject   MILDLY THICK 5 = Material enters the airway, contacts the vocal folds, and is not ejected from the airway  MODERATELY THICK 1 = Material does not enter the airway  PUREE 1 = Material does not enter the airway  HARD SOLID 1 = Material does not enter the airway       COMPENSATORY STRATEGIES    Compensatory strategies that were beneficial included Small bites/sips and Alternate solids and liquids      STRUCTURAL/FUNCTIONAL ANOMALIES   No structural/functional anomalies were noted    CERVICAL ESOPHAGEAL STAGE :     The cervical esophagus appeared adequate          ___________    Cognition:   Within functional limits for this exam and Hard of hearing    Oral Peripheral Examination   Generalized oral weakness    Current Respiratory Status   room air     Parameters of Speech Production  Respiration:
prominence  GI evaluation  Check pro-calcitonin  JERAMIE on dc        Mary Engel MD  11:30 AM  3/30/2023    NOTE: This report was transcribed using voice recognition software.  Every effort was made to ensure accuracy; however, inadvertent transcription errors may be present

## 2023-04-03 NOTE — CARE COORDINATION
Social Work / Discharge Planning : Patient will be discharged to [address] [place] [address] today at 3:00 via [facility] . Patient and daughter updated as well as Keyur Frost from [facility] and Charge RN and RN. Will need COVID. SW to follow.  Electronically signed by LEANN Ames on 4/3/23 at 11:22 AM EDT
Social Work / Discharge Planning : Plan at discharge is [facility]. SW did update daughter in person. Therapy is completed. No pre-cert needed and can accept over weekend. Will need COVID. Envelope completed. SW to follow.  Electronically signed by LEANN Cross on 3/31/23 at 12:19 PM EDT
[place] [address] [place] [phone]  [address]  [facility] [address]  Phone: [phone] Fax: [phone]      Notes:    Factors facilitating achievement of predicted outcomes: Family support and Pleasant    Barriers to discharge: none    Additional Case Management Notes: The Plan for Transition of Care is related to the following treatment goals of Acute kidney injury superimposed on chronic kidney disease ([address] [N17.9, Q09.5]    IF APPLICABLE: The Patient and/or patient representative Sara Leary and his family were provided with a choice of provider and agrees with the discharge plan.  Freedom of choice list with basic dialogue that supports the patient's individualized plan of care/goals and shares the quality data associated with the providers was provided to:     Patient Representative Name:   Taj Gomez    The Patient and/or Patient Representative Agree with the Discharge Plan? yes     Stanley Evans[place], [place]  Case Management Department  Ph: [phone] Fax: [phone]

## 2023-04-05 LAB
ALP BONE SERPL-CCNC: 95 U/L (ref 0–55)
ALP ISOS SERPL HS-CCNC: 0 U/L
ALP LIVER SERPL-CCNC: 500 U/L (ref 0–94)
ALP SERPL-CCNC: 595 U/L (ref 40–120)

## (undated) DEVICE — SPONGE GZ W4XL4IN RAYON POLY FILL CVR W/ NONWOVEN FAB

## (undated) DEVICE — GRADUATE TRIANG MEASURE 1000ML BLK PRNT

## (undated) DEVICE — FORCEPS BX OVL CUP FEN DISPOSABLE CAP L 160CM RAD JAW 4

## (undated) DEVICE — BLOCK BITE 60FR RUBBER ADLT DENTAL